# Patient Record
Sex: FEMALE | Race: BLACK OR AFRICAN AMERICAN | Employment: OTHER | URBAN - METROPOLITAN AREA
[De-identification: names, ages, dates, MRNs, and addresses within clinical notes are randomized per-mention and may not be internally consistent; named-entity substitution may affect disease eponyms.]

---

## 2017-01-31 DIAGNOSIS — E11.9 TYPE 2 DIABETES MELLITUS WITHOUT COMPLICATION, WITHOUT LONG-TERM CURRENT USE OF INSULIN (HCC): ICD-10-CM

## 2017-03-05 DIAGNOSIS — E11.9 TYPE 2 DIABETES MELLITUS WITHOUT COMPLICATION, WITHOUT LONG-TERM CURRENT USE OF INSULIN (HCC): ICD-10-CM

## 2017-03-21 DIAGNOSIS — Z12.31 ENCOUNTER FOR SCREENING MAMMOGRAM FOR MALIGNANT NEOPLASM OF BREAST: ICD-10-CM

## 2017-03-27 ENCOUNTER — OFFICE VISIT (OUTPATIENT)
Dept: FAMILY MEDICINE CLINIC | Age: 67
End: 2017-03-27
Payer: MEDICARE

## 2017-03-27 VITALS
HEIGHT: 63 IN | DIASTOLIC BLOOD PRESSURE: 79 MMHG | WEIGHT: 153 LBS | BODY MASS INDEX: 27.11 KG/M2 | HEART RATE: 70 BPM | SYSTOLIC BLOOD PRESSURE: 131 MMHG

## 2017-03-27 DIAGNOSIS — E78.2 MIXED HYPERLIPIDEMIA: ICD-10-CM

## 2017-03-27 DIAGNOSIS — M05.79 RHEUMATOID ARTHRITIS INVOLVING MULTIPLE SITES WITH POSITIVE RHEUMATOID FACTOR (HCC): ICD-10-CM

## 2017-03-27 DIAGNOSIS — D32.0 BENIGN NEOPLASM OF CEREBRAL MENINGES (HCC): ICD-10-CM

## 2017-03-27 DIAGNOSIS — E11.9 TYPE 2 DIABETES MELLITUS WITHOUT COMPLICATION, WITHOUT LONG-TERM CURRENT USE OF INSULIN (HCC): Primary | ICD-10-CM

## 2017-03-27 DIAGNOSIS — I10 ESSENTIAL HYPERTENSION: ICD-10-CM

## 2017-03-27 DIAGNOSIS — E66.09 NON MORBID OBESITY DUE TO EXCESS CALORIES: ICD-10-CM

## 2017-03-27 LAB — HBA1C MFR BLD: 7 %

## 2017-03-27 PROCEDURE — G8420 CALC BMI NORM PARAMETERS: HCPCS | Performed by: FAMILY MEDICINE

## 2017-03-27 PROCEDURE — G8427 DOCREV CUR MEDS BY ELIG CLIN: HCPCS | Performed by: FAMILY MEDICINE

## 2017-03-27 PROCEDURE — 99213 OFFICE O/P EST LOW 20 MIN: CPT | Performed by: FAMILY MEDICINE

## 2017-03-27 PROCEDURE — 83036 HEMOGLOBIN GLYCOSYLATED A1C: CPT | Performed by: FAMILY MEDICINE

## 2017-03-27 PROCEDURE — 3045F PR MOST RECENT HEMOGLOBIN A1C LEVEL 7.0-9.0%: CPT | Performed by: FAMILY MEDICINE

## 2017-03-27 PROCEDURE — 3014F SCREEN MAMMO DOC REV: CPT | Performed by: FAMILY MEDICINE

## 2017-03-27 PROCEDURE — 4040F PNEUMOC VAC/ADMIN/RCVD: CPT | Performed by: FAMILY MEDICINE

## 2017-03-27 PROCEDURE — 3017F COLORECTAL CA SCREEN DOC REV: CPT | Performed by: FAMILY MEDICINE

## 2017-03-27 PROCEDURE — G8400 PT W/DXA NO RESULTS DOC: HCPCS | Performed by: FAMILY MEDICINE

## 2017-03-27 PROCEDURE — G8484 FLU IMMUNIZE NO ADMIN: HCPCS | Performed by: FAMILY MEDICINE

## 2017-03-27 PROCEDURE — 1123F ACP DISCUSS/DSCN MKR DOCD: CPT | Performed by: FAMILY MEDICINE

## 2017-03-27 PROCEDURE — 1090F PRES/ABSN URINE INCON ASSESS: CPT | Performed by: FAMILY MEDICINE

## 2017-03-27 PROCEDURE — 1036F TOBACCO NON-USER: CPT | Performed by: FAMILY MEDICINE

## 2017-03-27 ASSESSMENT — ENCOUNTER SYMPTOMS
NAUSEA: 0
ABDOMINAL PAIN: 0
BLOOD IN STOOL: 0
SHORTNESS OF BREATH: 0
RHINORRHEA: 0
CONSTIPATION: 0
BACK PAIN: 0
COUGH: 0
DIARRHEA: 0

## 2017-03-27 ASSESSMENT — PATIENT HEALTH QUESTIONNAIRE - PHQ9
2. FEELING DOWN, DEPRESSED OR HOPELESS: 0
1. LITTLE INTEREST OR PLEASURE IN DOING THINGS: 0
SUM OF ALL RESPONSES TO PHQ QUESTIONS 1-9: 0
SUM OF ALL RESPONSES TO PHQ9 QUESTIONS 1 & 2: 0

## 2017-04-02 PROBLEM — M05.79 RHEUMATOID ARTHRITIS INVOLVING MULTIPLE SITES WITH POSITIVE RHEUMATOID FACTOR (HCC): Status: ACTIVE | Noted: 2017-04-02

## 2017-04-02 ASSESSMENT — ENCOUNTER SYMPTOMS
CHEST TIGHTNESS: 0
TROUBLE SWALLOWING: 0
SORE THROAT: 0

## 2017-04-20 DIAGNOSIS — E11.9 TYPE 2 DIABETES MELLITUS WITHOUT COMPLICATION, WITHOUT LONG-TERM CURRENT USE OF INSULIN (HCC): ICD-10-CM

## 2017-05-12 DIAGNOSIS — G44.219 EPISODIC TENSION-TYPE HEADACHE, NOT INTRACTABLE: ICD-10-CM

## 2017-05-12 DIAGNOSIS — M06.9 RHEUMATOID ARTHRITIS INVOLVING MULTIPLE SITES, UNSPECIFIED RHEUMATOID FACTOR PRESENCE: ICD-10-CM

## 2017-05-15 RX ORDER — HYDROCODONE BITARTRATE AND IBUPROFEN 7.5; 2 MG/1; MG/1
1 TABLET, FILM COATED ORAL EVERY 8 HOURS PRN
Qty: 30 TABLET | Refills: 0 | Status: SHIPPED | OUTPATIENT
Start: 2017-05-15 | End: 2018-06-25

## 2017-05-23 RX ORDER — AMLODIPINE BESYLATE 5 MG/1
TABLET ORAL
Qty: 90 TABLET | Refills: 0 | Status: SHIPPED | OUTPATIENT
Start: 2017-05-23 | End: 2017-08-21 | Stop reason: SDUPTHER

## 2017-06-12 RX ORDER — ATORVASTATIN CALCIUM 10 MG/1
TABLET, FILM COATED ORAL
Qty: 90 TABLET | Refills: 1 | Status: SHIPPED | OUTPATIENT
Start: 2017-06-12 | End: 2017-12-13 | Stop reason: SDUPTHER

## 2017-07-24 ENCOUNTER — OFFICE VISIT (OUTPATIENT)
Dept: FAMILY MEDICINE CLINIC | Age: 67
End: 2017-07-24
Payer: MEDICARE

## 2017-07-24 VITALS
BODY MASS INDEX: 27.18 KG/M2 | DIASTOLIC BLOOD PRESSURE: 65 MMHG | WEIGHT: 153.4 LBS | HEIGHT: 63 IN | SYSTOLIC BLOOD PRESSURE: 119 MMHG | HEART RATE: 69 BPM

## 2017-07-24 DIAGNOSIS — E11.9 TYPE 2 DIABETES MELLITUS WITHOUT COMPLICATION, WITHOUT LONG-TERM CURRENT USE OF INSULIN (HCC): ICD-10-CM

## 2017-07-24 DIAGNOSIS — D32.9 MENINGIOMA (HCC): ICD-10-CM

## 2017-07-24 DIAGNOSIS — E78.2 MIXED HYPERLIPIDEMIA: ICD-10-CM

## 2017-07-24 DIAGNOSIS — M05.79 RHEUMATOID ARTHRITIS INVOLVING MULTIPLE SITES WITH POSITIVE RHEUMATOID FACTOR (HCC): ICD-10-CM

## 2017-07-24 DIAGNOSIS — E66.09 NON MORBID OBESITY DUE TO EXCESS CALORIES: ICD-10-CM

## 2017-07-24 DIAGNOSIS — I10 ESSENTIAL HYPERTENSION: Primary | ICD-10-CM

## 2017-07-24 PROCEDURE — 1123F ACP DISCUSS/DSCN MKR DOCD: CPT | Performed by: FAMILY MEDICINE

## 2017-07-24 PROCEDURE — 1090F PRES/ABSN URINE INCON ASSESS: CPT | Performed by: FAMILY MEDICINE

## 2017-07-24 PROCEDURE — 3014F SCREEN MAMMO DOC REV: CPT | Performed by: FAMILY MEDICINE

## 2017-07-24 PROCEDURE — G8427 DOCREV CUR MEDS BY ELIG CLIN: HCPCS | Performed by: FAMILY MEDICINE

## 2017-07-24 PROCEDURE — 1036F TOBACCO NON-USER: CPT | Performed by: FAMILY MEDICINE

## 2017-07-24 PROCEDURE — 99214 OFFICE O/P EST MOD 30 MIN: CPT | Performed by: FAMILY MEDICINE

## 2017-07-24 PROCEDURE — 3046F HEMOGLOBIN A1C LEVEL >9.0%: CPT | Performed by: FAMILY MEDICINE

## 2017-07-24 PROCEDURE — 4040F PNEUMOC VAC/ADMIN/RCVD: CPT | Performed by: FAMILY MEDICINE

## 2017-07-24 PROCEDURE — 3017F COLORECTAL CA SCREEN DOC REV: CPT | Performed by: FAMILY MEDICINE

## 2017-07-24 PROCEDURE — G8400 PT W/DXA NO RESULTS DOC: HCPCS | Performed by: FAMILY MEDICINE

## 2017-07-24 PROCEDURE — G8419 CALC BMI OUT NRM PARAM NOF/U: HCPCS | Performed by: FAMILY MEDICINE

## 2017-07-24 RX ORDER — SPIRONOLACTONE AND HYDROCHLOROTHIAZIDE 25; 25 MG/1; MG/1
TABLET ORAL
COMMUNITY
Start: 2017-06-12 | End: 2017-07-24 | Stop reason: SDUPTHER

## 2017-07-24 ASSESSMENT — ENCOUNTER SYMPTOMS
BACK PAIN: 0
DIARRHEA: 0
RHINORRHEA: 0
ABDOMINAL PAIN: 0
CHEST TIGHTNESS: 0
CONSTIPATION: 0
SORE THROAT: 0
TROUBLE SWALLOWING: 0
SHORTNESS OF BREATH: 0
NAUSEA: 0
COUGH: 0

## 2017-08-15 DIAGNOSIS — M05.79 RHEUMATOID ARTHRITIS INVOLVING MULTIPLE SITES WITH POSITIVE RHEUMATOID FACTOR (HCC): Primary | ICD-10-CM

## 2017-08-15 RX ORDER — HYDROCODONE BITARTRATE AND ACETAMINOPHEN 5; 325 MG/1; MG/1
1 TABLET ORAL NIGHTLY PRN
Qty: 30 TABLET | Refills: 0 | Status: SHIPPED | OUTPATIENT
Start: 2017-08-15 | End: 2018-11-16 | Stop reason: SDUPTHER

## 2017-08-21 RX ORDER — AMLODIPINE BESYLATE 5 MG/1
TABLET ORAL
Qty: 90 TABLET | Refills: 3 | Status: SHIPPED | OUTPATIENT
Start: 2017-08-21 | End: 2018-08-16 | Stop reason: SDUPTHER

## 2017-08-25 DIAGNOSIS — E11.9 TYPE 2 DIABETES MELLITUS WITHOUT COMPLICATION, WITHOUT LONG-TERM CURRENT USE OF INSULIN (HCC): ICD-10-CM

## 2017-09-28 DIAGNOSIS — E11.9 TYPE 2 DIABETES MELLITUS WITHOUT COMPLICATION, WITHOUT LONG-TERM CURRENT USE OF INSULIN (HCC): ICD-10-CM

## 2017-10-24 ENCOUNTER — OFFICE VISIT (OUTPATIENT)
Dept: FAMILY MEDICINE CLINIC | Age: 67
End: 2017-10-24
Payer: MEDICARE

## 2017-10-24 VITALS
BODY MASS INDEX: 26.68 KG/M2 | WEIGHT: 150.6 LBS | HEART RATE: 70 BPM | DIASTOLIC BLOOD PRESSURE: 73 MMHG | HEIGHT: 63 IN | SYSTOLIC BLOOD PRESSURE: 107 MMHG

## 2017-10-24 DIAGNOSIS — M05.79 RHEUMATOID ARTHRITIS INVOLVING MULTIPLE SITES WITH POSITIVE RHEUMATOID FACTOR (HCC): ICD-10-CM

## 2017-10-24 DIAGNOSIS — E11.9 TYPE 2 DIABETES MELLITUS WITHOUT COMPLICATION, WITHOUT LONG-TERM CURRENT USE OF INSULIN (HCC): ICD-10-CM

## 2017-10-24 DIAGNOSIS — E66.09 NON MORBID OBESITY DUE TO EXCESS CALORIES: ICD-10-CM

## 2017-10-24 DIAGNOSIS — D32.9 MENINGIOMA (HCC): ICD-10-CM

## 2017-10-24 DIAGNOSIS — E78.2 MIXED HYPERLIPIDEMIA: ICD-10-CM

## 2017-10-24 DIAGNOSIS — I10 ESSENTIAL HYPERTENSION: Primary | ICD-10-CM

## 2017-10-24 PROCEDURE — 99213 OFFICE O/P EST LOW 20 MIN: CPT | Performed by: FAMILY MEDICINE

## 2017-10-24 PROCEDURE — G8427 DOCREV CUR MEDS BY ELIG CLIN: HCPCS | Performed by: FAMILY MEDICINE

## 2017-10-24 PROCEDURE — G8400 PT W/DXA NO RESULTS DOC: HCPCS | Performed by: FAMILY MEDICINE

## 2017-10-24 PROCEDURE — 1036F TOBACCO NON-USER: CPT | Performed by: FAMILY MEDICINE

## 2017-10-24 PROCEDURE — 3017F COLORECTAL CA SCREEN DOC REV: CPT | Performed by: FAMILY MEDICINE

## 2017-10-24 PROCEDURE — 1090F PRES/ABSN URINE INCON ASSESS: CPT | Performed by: FAMILY MEDICINE

## 2017-10-24 PROCEDURE — G8417 CALC BMI ABV UP PARAM F/U: HCPCS | Performed by: FAMILY MEDICINE

## 2017-10-24 PROCEDURE — 3045F PR MOST RECENT HEMOGLOBIN A1C LEVEL 7.0-9.0%: CPT | Performed by: FAMILY MEDICINE

## 2017-10-24 PROCEDURE — 4040F PNEUMOC VAC/ADMIN/RCVD: CPT | Performed by: FAMILY MEDICINE

## 2017-10-24 PROCEDURE — 3014F SCREEN MAMMO DOC REV: CPT | Performed by: FAMILY MEDICINE

## 2017-10-24 PROCEDURE — G8484 FLU IMMUNIZE NO ADMIN: HCPCS | Performed by: FAMILY MEDICINE

## 2017-10-24 PROCEDURE — 1123F ACP DISCUSS/DSCN MKR DOCD: CPT | Performed by: FAMILY MEDICINE

## 2017-10-24 RX ORDER — SPIRONOLACTONE AND HYDROCHLOROTHIAZIDE 25; 25 MG/1; MG/1
TABLET ORAL
COMMUNITY
Start: 2017-09-14 | End: 2021-07-14 | Stop reason: ALTCHOICE

## 2017-10-24 NOTE — PATIENT INSTRUCTIONS

## 2017-10-24 NOTE — PROGRESS NOTES
Chronic Disease Visit Information    BP Readings from Last 3 Encounters:   10/24/17 107/73   07/24/17 119/65   03/27/17 131/79          Hemoglobin A1C (%)   Date Value   03/27/2017 7.0   12/28/2016 7.0   09/29/2016 7.0     BUN (mg/dL)   Date Value   06/20/2016 15     CREATININE (no units)   Date Value   06/20/2016 0.68     Glucose (mg/dL)   Date Value   06/20/2016 123            Have you changed or started any medications since your last visit including any over-the-counter medicines, vitamins, or herbal medicines? no   Are you having any side effects from any of your medications? -  no  Have you stopped taking any of your medications? Is so, why? -  no    Have you seen any other physician or provider since your last visit? Yes - Records Obtained  Have you had any other diagnostic tests since your last visit? Yes - Records Obtained  Have you been seen in the emergency room and/or had an admission to a hospital since we last saw you? No  Have you had your annual diabetic retinal (eye) exam? No  Have you had your routine dental cleaning in the past 6 months? no    Have you activated your [employer] account? If not, what are your barriers?  Yes     Patient Care Team:  Sushma Soares MD as PCP - General (Family Medicine)  Sushma Soares MD as PCP - [facility] Attributed Provider         Medical History Review  Past Medical, Family, and Social History reviewed and does contribute to the patient presenting condition    Health Maintenance   Topic Date Due    Diabetic foot exam  03/02/1961    Diabetic retinal exam  03/02/1961    Diabetic microalbuminuria test  03/02/1969    Breast cancer screen  02/13/2017    Lipid screen  04/14/2017    Flu vaccine (1) 12/20/2017 (Originally 9/1/2017)    Pneumococcal low/med risk (1 of 2 - PCV13) 12/20/2017 (Originally 3/2/2016)    DTaP/Tdap/Td vaccine (1 - Tdap) 03/27/2018 (Originally 3/2/1970)    Diabetic hemoglobin A1C test  03/27/2018    Colon cancer screen colonoscopy 06/01/2020    Zostavax vaccine  Addressed    DEXA (modify frequency per FRAX score)  Addressed    Hepatitis C screen  Addressed

## 2017-10-25 ASSESSMENT — ENCOUNTER SYMPTOMS
BACK PAIN: 1
DIARRHEA: 0
ABDOMINAL PAIN: 0
NAUSEA: 0
TROUBLE SWALLOWING: 0
RHINORRHEA: 0
SHORTNESS OF BREATH: 0
CHEST TIGHTNESS: 0
COUGH: 0
SORE THROAT: 0
CONSTIPATION: 0

## 2017-11-01 DIAGNOSIS — E11.9 TYPE 2 DIABETES MELLITUS WITHOUT COMPLICATION, WITHOUT LONG-TERM CURRENT USE OF INSULIN (HCC): ICD-10-CM

## 2017-11-01 NOTE — TELEPHONE ENCOUNTER
Requested Prescriptions     Pending Prescriptions Disp Refills    metFORMIN (GLUCOPHAGE) 500 MG tablet [Pharmacy Med Name: metFORMIN  MG TABLET] 60 tablet 0     Sig: TAKE ONE TABLET BY MOUTH TWICE A DAY WITH MEALS     Next Visit Date:  Future Appointments  Date Time Provider Augustina Lopez   2/21/2018 9:45 AM Elias Russ MD [address] Maintenance   Topic Date Due    Diabetic foot exam  03/02/1961    Diabetic retinal exam  03/02/1961    Diabetic microalbuminuria test  03/02/1969    Breast cancer screen  02/13/2017    Lipid screen  04/14/2017    Flu vaccine (1) 12/20/2017 (Originally 9/1/2017)    Pneumococcal low/med risk (1 of 2 - PCV13) 12/20/2017 (Originally 3/2/2016)    DTaP/Tdap/Td vaccine (1 - Tdap) 03/27/2018 (Originally 3/2/1970)    Diabetic hemoglobin A1C test  03/27/2018    Colon cancer screen colonoscopy  06/01/2020    Zostavax vaccine  Addressed    DEXA (modify frequency per FRAX score)  Addressed    Hepatitis C screen  Addressed       Hemoglobin A1C (%)   Date Value   03/27/2017 7.0   12/28/2016 7.0   09/29/2016 7.0             ( goal A1C is < 7)   No results found for: LABMICR  No results found for: LDLCHOLESTEROL, LDLCALC    (goal LDL is <100)   AST (U/L)   Date Value   06/20/2016 24     ALT (U/L)   Date Value   06/20/2016 41     BUN (mg/dL)   Date Value   06/20/2016 15     BP Readings from Last 3 Encounters:   10/24/17 107/73   07/24/17 119/65   03/27/17 131/79          (goal 120/80)    All Future Testing planned in [facility]  Lab Frequency Next Occurrence               Patient Active Problem List:     Neck sprain     Hyperlipidemia     Obesity     DJD (degenerative joint disease) of cervical spine     Meningioma (HCC)     FH: breast cancer in first degree relative     Fibrocystic breast disease in female     Cramping of hands     Essential hypertension     Non morbid obesity due to excess calories     Type 2 diabetes mellitus without complication,

## 2017-11-21 ENCOUNTER — OFFICE VISIT (OUTPATIENT)
Dept: FAMILY MEDICINE CLINIC | Age: 67
End: 2017-11-21
Payer: MEDICARE

## 2017-11-21 VITALS
HEIGHT: 63 IN | BODY MASS INDEX: 26.72 KG/M2 | TEMPERATURE: 99.4 F | HEART RATE: 85 BPM | WEIGHT: 150.8 LBS | SYSTOLIC BLOOD PRESSURE: 125 MMHG | DIASTOLIC BLOOD PRESSURE: 83 MMHG

## 2017-11-21 DIAGNOSIS — I10 ESSENTIAL HYPERTENSION: ICD-10-CM

## 2017-11-21 DIAGNOSIS — J01.90 ACUTE RHINOSINUSITIS: Primary | ICD-10-CM

## 2017-11-21 DIAGNOSIS — J04.2 ACUTE LARYNGOTRACHEITIS: ICD-10-CM

## 2017-11-21 DIAGNOSIS — M05.79 RHEUMATOID ARTHRITIS INVOLVING MULTIPLE SITES WITH POSITIVE RHEUMATOID FACTOR (HCC): ICD-10-CM

## 2017-11-21 DIAGNOSIS — D32.9 MENINGIOMA (HCC): ICD-10-CM

## 2017-11-21 DIAGNOSIS — E11.9 TYPE 2 DIABETES MELLITUS WITHOUT COMPLICATION, WITHOUT LONG-TERM CURRENT USE OF INSULIN (HCC): ICD-10-CM

## 2017-11-21 PROCEDURE — G8427 DOCREV CUR MEDS BY ELIG CLIN: HCPCS | Performed by: FAMILY MEDICINE

## 2017-11-21 PROCEDURE — 1090F PRES/ABSN URINE INCON ASSESS: CPT | Performed by: FAMILY MEDICINE

## 2017-11-21 PROCEDURE — 1123F ACP DISCUSS/DSCN MKR DOCD: CPT | Performed by: FAMILY MEDICINE

## 2017-11-21 PROCEDURE — G8417 CALC BMI ABV UP PARAM F/U: HCPCS | Performed by: FAMILY MEDICINE

## 2017-11-21 PROCEDURE — G8484 FLU IMMUNIZE NO ADMIN: HCPCS | Performed by: FAMILY MEDICINE

## 2017-11-21 PROCEDURE — 4040F PNEUMOC VAC/ADMIN/RCVD: CPT | Performed by: FAMILY MEDICINE

## 2017-11-21 PROCEDURE — 3017F COLORECTAL CA SCREEN DOC REV: CPT | Performed by: FAMILY MEDICINE

## 2017-11-21 PROCEDURE — 3045F PR MOST RECENT HEMOGLOBIN A1C LEVEL 7.0-9.0%: CPT | Performed by: FAMILY MEDICINE

## 2017-11-21 PROCEDURE — G8400 PT W/DXA NO RESULTS DOC: HCPCS | Performed by: FAMILY MEDICINE

## 2017-11-21 PROCEDURE — 3014F SCREEN MAMMO DOC REV: CPT | Performed by: FAMILY MEDICINE

## 2017-11-21 PROCEDURE — 99213 OFFICE O/P EST LOW 20 MIN: CPT | Performed by: FAMILY MEDICINE

## 2017-11-21 PROCEDURE — 1036F TOBACCO NON-USER: CPT | Performed by: FAMILY MEDICINE

## 2017-11-21 ASSESSMENT — ENCOUNTER SYMPTOMS
SORE THROAT: 0
VOICE CHANGE: 1
FACIAL SWELLING: 0
COUGH: 1
ABDOMINAL PAIN: 0
WHEEZING: 0
TROUBLE SWALLOWING: 0
CONSTIPATION: 0
DIARRHEA: 0
NAUSEA: 0

## 2017-11-21 NOTE — PROGRESS NOTES
swelling, sneezing, sore throat and trouble swallowing. Respiratory: Positive for cough. Negative for wheezing. Gastrointestinal: Negative for abdominal pain, constipation, diarrhea and nausea. Genitourinary: Negative for difficulty urinating and dysuria. Musculoskeletal: Positive for arthralgias. Neurological: Negative for headaches. Psychiatric/Behavioral: Positive for sleep disturbance. Objective:     Physical Exam   Constitutional: She is oriented to person, place, and time. Vital signs are normal. She appears well-developed and well-nourished. HENT:   Head: Normocephalic. Right Ear: External ear normal.   Left Ear: External ear normal.   Nose is congested and wet. Narrow nasal passages. Discharge plus. Erythema plus. Sinuses are tender. Throat shows erythema plus. Tongue is dry. Buccal mucosa is dry. No exudates noted. Eyes: Pupils are equal, round, and reactive to light. No scleral icterus. Neck: Normal range of motion. Neck supple. Neck shows no nodes or bruits. Thyroid is not palpable. Cardiovascular: Normal rate, regular rhythm, normal heart sounds and normal pulses. Exam reveals no gallop. Pulmonary/Chest: Effort normal.   Breath sounds are diminished. Scattered crackles and wheezes noted. Cough plus. Abdominal: Soft. Bowel sounds are normal. She exhibits no mass. There is no tenderness. Liver and spleen are not palpable. Musculoskeletal:   She moves all her limbs well. She is able to lie down and sit up. No tremors noted. Gait is stable. Lymphadenopathy:     She has no cervical adenopathy. Neurological: She is alert and oriented to person, place, and time. Coordination normal.   Skin: Skin is warm and dry. No rash noted. Psychiatric: She has a normal mood and affect. Her behavior is normal.   Nursing note and vitals reviewed.     /83 (Site: Left Arm, Position: Sitting, Cuff Size: Large Adult)   Pulse 85   Temp 99.4 °F (37.4 °C) (Oral) Ht 5' 3\" (1.6 m)   Wt 150 lb 12.8 oz (68.4 kg)   LMP  (LMP Unknown)   Breastfeeding? No   BMI 26.71 kg/m²     Assessment:      1. Acute rhinosinusitis     2. Acute laryngotracheitis     3. Meningioma ([facility] [facility]     4. Essential hypertension     5. Type 2 diabetes mellitus without complication, without long-term current use of insulin ([facility] [facility]     6. Rheumatoid arthritis involving multiple sites with positive rheumatoid factor ([facility]         Plan:      No Follow-up on file. No orders of the defined types were placed in this encounter. No orders of the defined types were placed in this encounter. Findings and/or pathophysiology discussed with patient. Plan of treatment discussed. Patient's medical problems were discussed with her. Findings were explained. Pathophysiology was discussed. Patient appears to be improving. Patient states she has been feeling better over the past couple of days. She will take Tylenol. She is on low-dose steroids. She'll drink lots of fluids. She'll gargle her throat. She'll call if she does not feel better or she will go to the ER. 1.  Freida received counseling on the following healthy behaviors: nutrition and exercise  2. Patient given educational materials - see patient instructions  3. Was a self-tracking handout given in paper form or via [employer]? No  If yes, see orders or list here. 4.  Discussed use, benefit, and side effects of prescribed medications. Barriers to medication compliance addressed. All patient questions answered. Pt voiced understanding. 5.  Reviewed prior labs and health maintenance  6. Continue current medications, diet and exercise.     Completed Refills   Requested Prescriptions      No prescriptions requested or ordered in this encounter     Electronically signed by Darlene Quinn MD on 12/10/2017 at 7:04 PM

## 2017-12-03 DIAGNOSIS — E11.9 TYPE 2 DIABETES MELLITUS WITHOUT COMPLICATION, WITHOUT LONG-TERM CURRENT USE OF INSULIN (HCC): ICD-10-CM

## 2017-12-04 NOTE — TELEPHONE ENCOUNTER
arthritis involving multiple sites with positive rheumatoid factor ([facility]

## 2017-12-13 RX ORDER — ATORVASTATIN CALCIUM 10 MG/1
TABLET, FILM COATED ORAL
Qty: 90 TABLET | Refills: 1 | Status: SHIPPED | OUTPATIENT
Start: 2017-12-13 | End: 2018-06-11 | Stop reason: SDUPTHER

## 2017-12-13 NOTE — TELEPHONE ENCOUNTER
Requested Prescriptions     Pending Prescriptions Disp Refills    atorvastatin (LIPITOR) 10 MG tablet [Pharmacy Med Name: ATORVASTATIN TABS 10MG] 90 tablet 1     Sig: TAKE 1 TABLET DAILY     Next Visit Date:  Future Appointments  Date Time Provider Augustina Jessica   2/21/2018 9:45 AM Chanda Sargent MD [address] Maintenance   Topic Date Due    Diabetic foot exam  03/02/1961    Diabetic retinal exam  03/02/1961    Diabetic microalbuminuria test  03/02/1969    Breast cancer screen  02/13/2017    Lipid screen  04/14/2017    Flu vaccine (1) 12/20/2017 (Originally 9/1/2017)    Pneumococcal low/med risk (1 of 2 - PCV13) 12/20/2017 (Originally 3/2/2016)    DTaP/Tdap/Td vaccine (1 - Tdap) 03/27/2018 (Originally 3/2/1970)    Diabetic hemoglobin A1C test  03/27/2018    Colon cancer screen colonoscopy  06/01/2020    Zostavax vaccine  Addressed    DEXA (modify frequency per FRAX score)  Addressed    Hepatitis C screen  Addressed       Hemoglobin A1C (%)   Date Value   03/27/2017 7.0   12/28/2016 7.0   09/29/2016 7.0             ( goal A1C is < 7)   No results found for: LABMICR  No results found for: LDLCHOLESTEROL, LDLCALC    (goal LDL is <100)   AST (U/L)   Date Value   06/20/2016 24     ALT (U/L)   Date Value   06/20/2016 41     BUN (mg/dL)   Date Value   06/20/2016 15     BP Readings from Last 3 Encounters:   11/21/17 125/83   10/24/17 107/73   07/24/17 119/65          (goal 120/80)    All Future Testing planned in [facility]  Lab Frequency Next Occurrence               Patient Active Problem List:     Neck sprain     Hyperlipidemia     Obesity     DJD (degenerative joint disease) of cervical spine     Meningioma (HCC)     FH: breast cancer in first degree relative     Fibrocystic breast disease in female     Cramping of hands     Essential hypertension     Non morbid obesity due to excess calories     Type 2 diabetes mellitus without complication, without long-term current use of

## 2017-12-21 LAB
BASOPHILS ABSOLUTE: NORMAL /ΜL
BASOPHILS RELATIVE PERCENT: NORMAL %
BUN BLDV-MCNC: ABNORMAL MG/DL
CALCIUM SERPL-MCNC: ABNORMAL MG/DL
CHLORIDE BLD-SCNC: ABNORMAL MMOL/L
CO2: ABNORMAL MMOL/L
CREAT SERPL-MCNC: 1.2 MG/DL
EOSINOPHILS ABSOLUTE: NORMAL /ΜL
EOSINOPHILS RELATIVE PERCENT: NORMAL %
GFR CALCULATED: 54.4
GLUCOSE BLD-MCNC: ABNORMAL MG/DL
HCT VFR BLD CALC: NORMAL % (ref 36–46)
HEMOGLOBIN: NORMAL G/DL (ref 12–16)
LYMPHOCYTES ABSOLUTE: NORMAL /ΜL
LYMPHOCYTES RELATIVE PERCENT: NORMAL %
MCH RBC QN AUTO: 29.9 PG
MCHC RBC AUTO-ENTMCNC: 35 G/DL
MCV RBC AUTO: 85.5 FL
MONOCYTES ABSOLUTE: NORMAL /ΜL
MONOCYTES RELATIVE PERCENT: NORMAL %
NEUTROPHILS ABSOLUTE: NORMAL /ΜL
NEUTROPHILS RELATIVE PERCENT: NORMAL %
PLATELET # BLD: 304 K/ΜL
PMV BLD AUTO: NORMAL FL
POTASSIUM SERPL-SCNC: ABNORMAL MMOL/L
RBC # BLD: 4.48 10^6/ΜL
SODIUM BLD-SCNC: ABNORMAL MMOL/L
WBC # BLD: 7.01 10^3/ML

## 2017-12-26 ENCOUNTER — OFFICE VISIT (OUTPATIENT)
Dept: FAMILY MEDICINE CLINIC | Age: 67
End: 2017-12-26
Payer: MEDICARE

## 2017-12-26 VITALS
HEIGHT: 63 IN | HEART RATE: 71 BPM | WEIGHT: 152 LBS | SYSTOLIC BLOOD PRESSURE: 126 MMHG | BODY MASS INDEX: 26.93 KG/M2 | DIASTOLIC BLOOD PRESSURE: 72 MMHG

## 2017-12-26 DIAGNOSIS — N28.9 RENAL INSUFFICIENCY: Primary | ICD-10-CM

## 2017-12-26 DIAGNOSIS — I10 ESSENTIAL HYPERTENSION: ICD-10-CM

## 2017-12-26 DIAGNOSIS — E11.9 TYPE 2 DIABETES MELLITUS WITHOUT COMPLICATION, WITHOUT LONG-TERM CURRENT USE OF INSULIN (HCC): ICD-10-CM

## 2017-12-26 DIAGNOSIS — M05.79 RHEUMATOID ARTHRITIS INVOLVING MULTIPLE SITES WITH POSITIVE RHEUMATOID FACTOR (HCC): ICD-10-CM

## 2017-12-26 DIAGNOSIS — D32.0 BENIGN NEOPLASM OF CEREBRAL MENINGES (HCC): ICD-10-CM

## 2017-12-26 PROCEDURE — 1090F PRES/ABSN URINE INCON ASSESS: CPT | Performed by: FAMILY MEDICINE

## 2017-12-26 PROCEDURE — G8417 CALC BMI ABV UP PARAM F/U: HCPCS | Performed by: FAMILY MEDICINE

## 2017-12-26 PROCEDURE — 3014F SCREEN MAMMO DOC REV: CPT | Performed by: FAMILY MEDICINE

## 2017-12-26 PROCEDURE — G8484 FLU IMMUNIZE NO ADMIN: HCPCS | Performed by: FAMILY MEDICINE

## 2017-12-26 PROCEDURE — 99213 OFFICE O/P EST LOW 20 MIN: CPT | Performed by: FAMILY MEDICINE

## 2017-12-26 PROCEDURE — 3017F COLORECTAL CA SCREEN DOC REV: CPT | Performed by: FAMILY MEDICINE

## 2017-12-26 PROCEDURE — 3045F PR MOST RECENT HEMOGLOBIN A1C LEVEL 7.0-9.0%: CPT | Performed by: FAMILY MEDICINE

## 2017-12-26 PROCEDURE — 1036F TOBACCO NON-USER: CPT | Performed by: FAMILY MEDICINE

## 2017-12-26 PROCEDURE — G8427 DOCREV CUR MEDS BY ELIG CLIN: HCPCS | Performed by: FAMILY MEDICINE

## 2017-12-26 PROCEDURE — G8400 PT W/DXA NO RESULTS DOC: HCPCS | Performed by: FAMILY MEDICINE

## 2017-12-26 PROCEDURE — 1123F ACP DISCUSS/DSCN MKR DOCD: CPT | Performed by: FAMILY MEDICINE

## 2017-12-26 PROCEDURE — 4040F PNEUMOC VAC/ADMIN/RCVD: CPT | Performed by: FAMILY MEDICINE

## 2017-12-26 NOTE — PROGRESS NOTES
Chronic Disease Visit Information    BP Readings from Last 3 Encounters:   11/21/17 125/83   10/24/17 107/73   07/24/17 119/65          Hemoglobin A1C (%)   Date Value   03/27/2017 7.0   12/28/2016 7.0   09/29/2016 7.0     BUN (mg/dL)   Date Value   06/20/2016 15     CREATININE (no units)   Date Value   12/21/2017 1.20 (H)     Glucose (mg/dL)   Date Value   06/20/2016 123            Have you changed or started any medications since your last visit including any over-the-counter medicines, vitamins, or herbal medicines? no   Are you having any side effects from any of your medications? -  no  Have you stopped taking any of your medications? Is so, why? -  no    Have you seen any other physician or provider since your last visit? No  Have you had any other diagnostic tests since your last visit? No  Have you been seen in the emergency room and/or had an admission to a hospital since we last saw you? No  Have you had your annual diabetic retinal (eye) exam? No  Have you had your routine dental cleaning in the past 6 months? no    Have you activated your [email or web address] account? If not, what are your barriers?  Yes     Patient Care Team:  Terrell Laguna MD as PCP - General (Family Medicine)  Terrell Laguna MD as PCP - S Attributed Provider         Medical History Review  Past Medical, Family, and Social History reviewed and does contribute to the patient presenting condition    Health Maintenance   Topic Date Due    Diabetic foot exam  03/02/1961    Diabetic retinal exam  03/02/1961    Diabetic microalbuminuria test  03/02/1969    Pneumococcal low/med risk (1 of 2 - PCV13) 03/02/2016    Breast cancer screen  02/13/2017    Lipid screen  04/14/2017    Flu vaccine (1) 09/01/2017    DTaP/Tdap/Td vaccine (1 - Tdap) 03/27/2018 (Originally 3/2/1970)    Diabetic hemoglobin A1C test  03/27/2018    Colon cancer screen colonoscopy  06/01/2020    Zostavax vaccine  Addressed    DEXA (modify frequency per FRAX score) Addressed    Hepatitis C screen  Addressed

## 2017-12-26 NOTE — PATIENT INSTRUCTIONS
Patient Education        Well Visit, Over 72: Care Instructions  Your Care Instructions    Physical exams can help you stay healthy. Your doctor has checked your overall health and may have suggested ways to take good care of yourself. He or she also may have recommended tests. At home, you can help prevent illness with healthy eating, regular exercise, and other steps. Follow-up care is a key part of your treatment and safety. Be sure to make and go to all appointments, and call your doctor if you are having problems. It's also a good idea to know your test results and keep a list of the medicines you take. How can you care for yourself at home? · Reach and stay at a healthy weight. This will lower your risk for many problems, such as obesity, diabetes, heart disease, and high blood pressure. · Get at least 30 minutes of exercise on most days of the week. Walking is a good choice. You also may want to do other activities, such as running, swimming, cycling, or playing tennis or team sports. · Do not smoke. Smoking can make health problems worse. If you need help quitting, talk to your doctor about stop-smoking programs and medicines. These can increase your chances of quitting for good. · Protect your skin from too much sun. When you're outdoors from 10 a.m. to 4 p.m., stay in the shade or cover up with clothing and a hat with a wide brim. Wear sunglasses that block UV rays. Even when it's cloudy, put broad-spectrum sunscreen (SPF 30 or higher) on any exposed skin. · See a dentist one or two times a year for checkups and to have your teeth cleaned. · Wear a seat belt in the car. · Limit alcohol to 2 drinks a day for men and 1 drink a day for women. Too much alcohol can cause health problems. Follow your doctor's advice about when to have certain tests. These tests can spot problems early. For men and women  · Cholesterol.  Your doctor will tell you how often to have this done based on your overall health test. Some experts recommend that you discuss the benefits and risks of the test with your doctor. · Abdominal aortic aneurysm. Ask your doctor whether you should have a test to check for an aneurysm. You may need a test if you ever smoked or if your parent, brother, sister, or child has had an aneurysm. When should you call for help? Watch closely for changes in your health, and be sure to contact your doctor if you have any problems or symptoms that concern you. Where can you learn more? Go to https://[employer]. org and sign in to your [employer] account. Enter S631 in the [employer] box to learn more about \"Well Visit, Over 65: Care Instructions. \"     If you do not have an account, please click on the \"Sign Up Now\" link. Current as of: May 12, 2017  Content Version: 11.4  © 4345-0258 Healthwise, Incorporated. Care instructions adapted under license by [facility] ([facility]). If you have questions about a medical condition or this instruction, always ask your healthcare professional. [address] any warranty or liability for your use of this information.

## 2017-12-28 ASSESSMENT — ENCOUNTER SYMPTOMS
SHORTNESS OF BREATH: 0
RHINORRHEA: 0
DIARRHEA: 0
ABDOMINAL PAIN: 0
COUGH: 0
BACK PAIN: 0
NAUSEA: 0
CHEST TIGHTNESS: 0
TROUBLE SWALLOWING: 0
SORE THROAT: 0
CONSTIPATION: 0

## 2017-12-28 NOTE — PROGRESS NOTES
Negative for chills, diaphoresis, fatigue and fever. HENT: Negative for congestion, ear pain, rhinorrhea, sore throat and trouble swallowing. Eyes: Negative for visual disturbance. Respiratory: Negative for cough, chest tightness and shortness of breath. Cardiovascular: Negative for chest pain, palpitations and leg swelling. Gastrointestinal: Negative for abdominal pain, constipation, diarrhea and nausea. Endocrine: Negative for polydipsia and polyuria. Genitourinary: Negative for difficulty urinating, dysuria and hematuria. Musculoskeletal: Positive for arthralgias and gait problem. Negative for back pain and neck pain. Skin: Negative for rash. Neurological: Negative for dizziness, light-headedness and headaches. Psychiatric/Behavioral: Negative for dysphoric mood and sleep disturbance. The patient is not nervous/anxious. Objective:     Physical Exam   Constitutional: She is oriented to person, place, and time. Vital signs are normal. She appears well-developed and well-nourished. HENT:   Head: Normocephalic. Mouth/Throat: Oropharynx is clear and moist.   Eyes: Pupils are equal, round, and reactive to light. No scleral icterus. Neck: Normal range of motion. Neck supple. No JVD present. Neck shows no nodes or bruits. Thyroid is not palpable. Cardiovascular: Normal rate, regular rhythm, normal heart sounds and normal pulses. Exam reveals no gallop. Pulmonary/Chest: Effort normal and breath sounds normal. She has no wheezes. She has no rales. Abdominal: Soft. Bowel sounds are normal. She exhibits no mass. There is no tenderness. Liver and spleen are not palpable. Musculoskeletal: Normal range of motion. Legs show no edema. Both knees move well with mild tenderness. She is able to lie down and sit up. Gait is stable. Lymphadenopathy:     She has no cervical adenopathy. Neurological: She is alert and oriented to person, place, and time.    Skin: Skin is warm and dry. Psychiatric: She has a normal mood and affect. Her behavior is normal. Thought content normal.   Nursing note and vitals reviewed. /72 (Site: Left Arm, Position: Sitting, Cuff Size: Large Adult)   Pulse 71   Ht 5' 2.99\" (1.6 m)   Wt 152 lb (68.9 kg)   LMP  (LMP Unknown)   Breastfeeding? No   BMI 26.93 kg/m²     Assessment:      1. Renal insufficiency  Comprehensive Metabolic Panel   2. Essential hypertension     3. Type 2 diabetes mellitus without complication, without long-term current use of insulin ([facility] [facility]     4. Benign neoplasm of cerebral meninges (HCC)     5. Rheumatoid arthritis involving multiple sites with positive rheumatoid factor ([facility] [address] [facility]         Plan:      Return in about 1 month (around 1/26/2018). Orders Placed This Encounter   Procedures    Comprehensive Metabolic Panel     Standing Status:   Future     Standing Expiration Date:   12/26/2018     No orders of the defined types were placed in this encounter. Findings and/or pathophysiology discussed with patient. Plan of treatment discussed. Patient's medical problems were discussed with her. Findings were explained. Chart was evaluated. Past lab work was discussed. Tests results were discussed. Her medications were reviewed. Patient advised to drink lots of fluids. She'll get repeat CMP in about 2 weeks' time. She may need further evaluation as needed. She'll continue with her medications. Range of motion exercises were discussed. 1.  Freida received counseling on the following healthy behaviors: nutrition and exercise  2. Patient given educational materials - see patient instructions  3. Was a self-tracking handout given in paper form or via [email or web address]? No  If yes, see orders or list here. 4.  Discussed use, benefit, and side effects of prescribed medications. Barriers to medication compliance addressed. All patient questions answered. Pt voiced understanding.    5.  Reviewed prior labs and health

## 2018-01-09 DIAGNOSIS — E78.2 MIXED HYPERLIPIDEMIA: ICD-10-CM

## 2018-01-09 DIAGNOSIS — N28.9 RENAL INSUFFICIENCY: ICD-10-CM

## 2018-01-09 LAB
ALBUMIN SERPL-MCNC: 4 G/DL
ALP BLD-CCNC: 59 U/L
ALT SERPL-CCNC: 26 U/L
ANION GAP SERPL CALCULATED.3IONS-SCNC: ABNORMAL MMOL/L
AST SERPL-CCNC: 19 U/L
BILIRUB SERPL-MCNC: 0.6 MG/DL (ref 0.1–1.4)
BUN BLDV-MCNC: 18 MG/DL
CALCIUM SERPL-MCNC: 9.8 MG/DL
CHLORIDE BLD-SCNC: 100 MMOL/L
CHOLESTEROL, TOTAL: 198 MG/DL
CHOLESTEROL/HDL RATIO: 3
CO2: 28 MMOL/L
CREAT SERPL-MCNC: 0.9 MG/DL
GFR CALCULATED: 75.8
GLUCOSE BLD-MCNC: 112 MG/DL
HDLC SERPL-MCNC: 66 MG/DL (ref 35–70)
LDL CHOLESTEROL CALCULATED: 100 MG/DL (ref 0–160)
POTASSIUM SERPL-SCNC: 4.2 MMOL/L
SODIUM BLD-SCNC: 142 MMOL/L
TOTAL PROTEIN: 7.1
TRIGL SERPL-MCNC: 160 MG/DL
VLDLC SERPL CALC-MCNC: 32 MG/DL

## 2018-01-16 ENCOUNTER — OFFICE VISIT (OUTPATIENT)
Dept: FAMILY MEDICINE CLINIC | Age: 68
End: 2018-01-16
Payer: MEDICARE

## 2018-01-16 VITALS
BODY MASS INDEX: 26.72 KG/M2 | SYSTOLIC BLOOD PRESSURE: 102 MMHG | DIASTOLIC BLOOD PRESSURE: 74 MMHG | WEIGHT: 150.8 LBS | HEART RATE: 82 BPM | HEIGHT: 63 IN

## 2018-01-16 DIAGNOSIS — E86.0 DEHYDRATION, MILD: ICD-10-CM

## 2018-01-16 DIAGNOSIS — A60.00 GENITAL HERPES SIMPLEX, UNSPECIFIED SITE: ICD-10-CM

## 2018-01-16 DIAGNOSIS — I10 ESSENTIAL HYPERTENSION: ICD-10-CM

## 2018-01-16 DIAGNOSIS — M05.79 RHEUMATOID ARTHRITIS INVOLVING MULTIPLE SITES WITH POSITIVE RHEUMATOID FACTOR (HCC): ICD-10-CM

## 2018-01-16 DIAGNOSIS — R10.9 ACUTE ABDOMINAL PAIN: Primary | ICD-10-CM

## 2018-01-16 DIAGNOSIS — K52.9 ACUTE GASTROENTERITIS: ICD-10-CM

## 2018-01-16 DIAGNOSIS — E11.9 TYPE 2 DIABETES MELLITUS WITHOUT COMPLICATION, WITHOUT LONG-TERM CURRENT USE OF INSULIN (HCC): ICD-10-CM

## 2018-01-16 PROCEDURE — 4040F PNEUMOC VAC/ADMIN/RCVD: CPT | Performed by: FAMILY MEDICINE

## 2018-01-16 PROCEDURE — G8400 PT W/DXA NO RESULTS DOC: HCPCS | Performed by: FAMILY MEDICINE

## 2018-01-16 PROCEDURE — 1123F ACP DISCUSS/DSCN MKR DOCD: CPT | Performed by: FAMILY MEDICINE

## 2018-01-16 PROCEDURE — 3017F COLORECTAL CA SCREEN DOC REV: CPT | Performed by: FAMILY MEDICINE

## 2018-01-16 PROCEDURE — 1036F TOBACCO NON-USER: CPT | Performed by: FAMILY MEDICINE

## 2018-01-16 PROCEDURE — 3046F HEMOGLOBIN A1C LEVEL >9.0%: CPT | Performed by: FAMILY MEDICINE

## 2018-01-16 PROCEDURE — 1090F PRES/ABSN URINE INCON ASSESS: CPT | Performed by: FAMILY MEDICINE

## 2018-01-16 PROCEDURE — G8484 FLU IMMUNIZE NO ADMIN: HCPCS | Performed by: FAMILY MEDICINE

## 2018-01-16 PROCEDURE — 3014F SCREEN MAMMO DOC REV: CPT | Performed by: FAMILY MEDICINE

## 2018-01-16 PROCEDURE — 99213 OFFICE O/P EST LOW 20 MIN: CPT | Performed by: FAMILY MEDICINE

## 2018-01-16 PROCEDURE — G8417 CALC BMI ABV UP PARAM F/U: HCPCS | Performed by: FAMILY MEDICINE

## 2018-01-16 PROCEDURE — G8427 DOCREV CUR MEDS BY ELIG CLIN: HCPCS | Performed by: FAMILY MEDICINE

## 2018-01-16 RX ORDER — VALACYCLOVIR HYDROCHLORIDE 1 G/1
1000 TABLET, FILM COATED ORAL 2 TIMES DAILY
Qty: 20 TABLET | Refills: 0 | Status: SHIPPED | OUTPATIENT
Start: 2018-01-16 | End: 2018-01-23 | Stop reason: SDUPTHER

## 2018-01-16 NOTE — PATIENT INSTRUCTIONS
These can cause stomach upset. Talk to your doctor if you take daily aspirin for another health problem. When should you call for help? Call 911 anytime you think you may need emergency care. For example, call if:  ? · You passed out (lost consciousness). ? · You pass maroon or very bloody stools. ? · You vomit blood or what looks like coffee grounds. ? · You have new, severe belly pain. ?Call your doctor now or seek immediate medical care if:  ? · Your pain gets worse, especially if it becomes focused in one area of your belly. ? · You have a new or higher fever. ? · Your stools are black and look like tar, or they have streaks of blood. ? · You have unexpected vaginal bleeding. ? · You have symptoms of a urinary tract infection. These may include:  ¨ Pain when you urinate. ¨ Urinating more often than usual.  ¨ Blood in your urine. ? · You are dizzy or lightheaded, or you feel like you may faint. ? Watch closely for changes in your health, and be sure to contact your doctor if:  ? · You are not getting better after 1 day (24 hours). Where can you learn more? Go to https://[employer]. org and sign in to your [employer] account. Enter M815 in the [employer] box to learn more about \"Abdominal Pain: Care Instructions. \"     If you do not have an account, please click on the \"Sign Up Now\" link. Current as of: March 20, 2017  Content Version: 11.5  © 5036-6004 Healthwise, Incorporated. Care instructions adapted under license by [facility] ([facility]). If you have questions about a medical condition or this instruction, always ask your healthcare professional. [place] [address] any warranty or liability for your use of this information.

## 2018-01-16 NOTE — PROGRESS NOTES
03/27/2018    Lipid screen  01/09/2019    Potassium monitoring  01/09/2019    Creatinine monitoring  01/09/2019    Colon cancer screen colonoscopy  06/01/2020    Zostavax vaccine  Addressed    DEXA (modify frequency per FRAX score)  Addressed    Hepatitis C screen  Addressed

## 2018-01-17 ASSESSMENT — ENCOUNTER SYMPTOMS
SHORTNESS OF BREATH: 0
VOMITING: 1
CHEST TIGHTNESS: 0
BACK PAIN: 0
TROUBLE SWALLOWING: 0
COUGH: 0
CONSTIPATION: 0
ABDOMINAL PAIN: 1
DIARRHEA: 1
SORE THROAT: 0
NAUSEA: 0
RHINORRHEA: 0

## 2018-01-23 DIAGNOSIS — A60.00 GENITAL HERPES SIMPLEX, UNSPECIFIED SITE: ICD-10-CM

## 2018-01-23 RX ORDER — VALACYCLOVIR HYDROCHLORIDE 1 G/1
TABLET, FILM COATED ORAL
Qty: 20 TABLET | Refills: 0 | Status: SHIPPED | OUTPATIENT
Start: 2018-01-23 | End: 2018-11-02

## 2018-01-29 ENCOUNTER — OFFICE VISIT (OUTPATIENT)
Dept: FAMILY MEDICINE CLINIC | Age: 68
End: 2018-01-29
Payer: MEDICARE

## 2018-01-29 VITALS
DIASTOLIC BLOOD PRESSURE: 77 MMHG | WEIGHT: 150.2 LBS | HEART RATE: 79 BPM | SYSTOLIC BLOOD PRESSURE: 121 MMHG | BODY MASS INDEX: 26.61 KG/M2 | HEIGHT: 63 IN

## 2018-01-29 DIAGNOSIS — M05.79 RHEUMATOID ARTHRITIS INVOLVING MULTIPLE SITES WITH POSITIVE RHEUMATOID FACTOR (HCC): ICD-10-CM

## 2018-01-29 DIAGNOSIS — E11.9 TYPE 2 DIABETES MELLITUS WITHOUT COMPLICATION, WITHOUT LONG-TERM CURRENT USE OF INSULIN (HCC): Primary | ICD-10-CM

## 2018-01-29 DIAGNOSIS — E78.2 MIXED HYPERLIPIDEMIA: ICD-10-CM

## 2018-01-29 DIAGNOSIS — Z12.39 BREAST CANCER SCREENING: ICD-10-CM

## 2018-01-29 DIAGNOSIS — I10 ESSENTIAL HYPERTENSION: ICD-10-CM

## 2018-01-29 DIAGNOSIS — E11.9 COMPREHENSIVE DIABETIC FOOT EXAMINATION, TYPE 2 DM, ENCOUNTER FOR (HCC): ICD-10-CM

## 2018-01-29 LAB — HBA1C MFR BLD: 6.5 %

## 2018-01-29 PROCEDURE — 83036 HEMOGLOBIN GLYCOSYLATED A1C: CPT | Performed by: PHYSICIAN ASSISTANT

## 2018-01-29 PROCEDURE — 99214 OFFICE O/P EST MOD 30 MIN: CPT | Performed by: PHYSICIAN ASSISTANT

## 2018-01-29 ASSESSMENT — ENCOUNTER SYMPTOMS
VOMITING: 0
SHORTNESS OF BREATH: 0
DIARRHEA: 0
NAUSEA: 0
BACK PAIN: 0
ABDOMINAL PAIN: 0
CONSTIPATION: 0
COUGH: 0

## 2018-01-29 NOTE — PROGRESS NOTES
7.5-200 MG per tablet Take 1 tablet by mouth every 8 hours as needed for Pain . Earliest Fill Date: 5/15/17 30 tablet 0    methotrexate (RHEUMATREX) 2.5 MG chemo tablet       predniSONE (DELTASONE) 5 MG tablet Take 5 mg by mouth daily. No facility-administered encounter medications on file as of 1/29/2018. Medications, laboratory testing, imaging, consultation, and follow up as documented in this encounter.      Electronically signed by Raciel hCamberlain PA-C on 1/29/18 at 2:19 PM

## 2018-01-29 NOTE — PATIENT INSTRUCTIONS
Patient Education        Well Visit, Over 72: Care Instructions  Your Care Instructions    Physical exams can help you stay healthy. Your doctor has checked your overall health and may have suggested ways to take good care of yourself. He or she also may have recommended tests. At home, you can help prevent illness with healthy eating, regular exercise, and other steps. Follow-up care is a key part of your treatment and safety. Be sure to make and go to all appointments, and call your doctor if you are having problems. It's also a good idea to know your test results and keep a list of the medicines you take. How can you care for yourself at home? · Reach and stay at a healthy weight. This will lower your risk for many problems, such as obesity, diabetes, heart disease, and high blood pressure. · Get at least 30 minutes of exercise on most days of the week. Walking is a good choice. You also may want to do other activities, such as running, swimming, cycling, or playing tennis or team sports. · Do not smoke. Smoking can make health problems worse. If you need help quitting, talk to your doctor about stop-smoking programs and medicines. These can increase your chances of quitting for good. · Protect your skin from too much sun. When you're outdoors from 10 a.m. to 4 p.m., stay in the shade or cover up with clothing and a hat with a wide brim. Wear sunglasses that block UV rays. Even when it's cloudy, put broad-spectrum sunscreen (SPF 30 or higher) on any exposed skin. · See a dentist one or two times a year for checkups and to have your teeth cleaned. · Wear a seat belt in the car. · Limit alcohol to 2 drinks a day for men and 1 drink a day for women. Too much alcohol can cause health problems. Follow your doctor's advice about when to have certain tests. These tests can spot problems early. For men and women  · Cholesterol.  Your doctor will tell you how often to have this done based on your overall health test. Some experts recommend that you discuss the benefits and risks of the test with your doctor. · Abdominal aortic aneurysm. Ask your doctor whether you should have a test to check for an aneurysm. You may need a test if you ever smoked or if your parent, brother, sister, or child has had an aneurysm. When should you call for help? Watch closely for changes in your health, and be sure to contact your doctor if you have any problems or symptoms that concern you. Where can you learn more? Go to https://myRete.[employer]. org and sign in to your [facility] account. Enter Y262 in the [employer] box to learn more about \"Well Visit, Over 65: Care Instructions. \"     If you do not have an account, please click on the \"Sign Up Now\" link. Current as of: May 12, 2017  Content Version: 11.5  © 2312-7901 Healthwise, Incorporated. Care instructions adapted under license by [facility] ([facility]). If you have questions about a medical condition or this instruction, always ask your healthcare professional. [address] any warranty or liability for your use of this information.

## 2018-02-03 DIAGNOSIS — E11.9 TYPE 2 DIABETES MELLITUS WITHOUT COMPLICATION, WITHOUT LONG-TERM CURRENT USE OF INSULIN (HCC): ICD-10-CM

## 2018-03-07 DIAGNOSIS — E11.9 TYPE 2 DIABETES MELLITUS WITHOUT COMPLICATION, WITHOUT LONG-TERM CURRENT USE OF INSULIN (HCC): ICD-10-CM

## 2018-03-20 DIAGNOSIS — Z12.39 BREAST CANCER SCREENING: ICD-10-CM

## 2018-04-03 DIAGNOSIS — E11.9 TYPE 2 DIABETES MELLITUS WITHOUT COMPLICATION, WITHOUT LONG-TERM CURRENT USE OF INSULIN (HCC): ICD-10-CM

## 2018-04-03 LAB
CREATININE, URINE: 271.7
MICROALBUMIN/CREAT 24H UR: 17.2 MG/G{CREAT}
MICROALBUMIN/CREAT UR-RTO: 6.3

## 2018-04-25 ENCOUNTER — OFFICE VISIT (OUTPATIENT)
Dept: FAMILY MEDICINE CLINIC | Age: 68
End: 2018-04-25
Payer: MEDICARE

## 2018-04-25 VITALS
HEIGHT: 63 IN | WEIGHT: 146.6 LBS | SYSTOLIC BLOOD PRESSURE: 116 MMHG | DIASTOLIC BLOOD PRESSURE: 72 MMHG | BODY MASS INDEX: 25.98 KG/M2 | HEART RATE: 67 BPM

## 2018-04-25 DIAGNOSIS — Z00.00 ROUTINE GENERAL MEDICAL EXAMINATION AT A HEALTH CARE FACILITY: Primary | ICD-10-CM

## 2018-04-25 PROCEDURE — 4040F PNEUMOC VAC/ADMIN/RCVD: CPT | Performed by: PHYSICIAN ASSISTANT

## 2018-04-25 PROCEDURE — G0438 PPPS, INITIAL VISIT: HCPCS | Performed by: PHYSICIAN ASSISTANT

## 2018-04-25 ASSESSMENT — ANXIETY QUESTIONNAIRES: GAD7 TOTAL SCORE: 0

## 2018-04-25 ASSESSMENT — LIFESTYLE VARIABLES: HOW OFTEN DO YOU HAVE A DRINK CONTAINING ALCOHOL: 0

## 2018-04-25 ASSESSMENT — PATIENT HEALTH QUESTIONNAIRE - PHQ9: SUM OF ALL RESPONSES TO PHQ QUESTIONS 1-9: 0

## 2018-05-30 DIAGNOSIS — I10 ESSENTIAL HYPERTENSION: ICD-10-CM

## 2018-05-30 RX ORDER — LISINOPRIL 20 MG/1
TABLET ORAL
Qty: 90 TABLET | Refills: 1 | Status: SHIPPED | OUTPATIENT
Start: 2018-05-30 | End: 2018-11-27 | Stop reason: SDUPTHER

## 2018-06-11 DIAGNOSIS — E11.9 TYPE 2 DIABETES MELLITUS WITHOUT COMPLICATION, WITHOUT LONG-TERM CURRENT USE OF INSULIN (HCC): ICD-10-CM

## 2018-06-11 RX ORDER — ATORVASTATIN CALCIUM 10 MG/1
TABLET, FILM COATED ORAL
Qty: 90 TABLET | Refills: 1 | Status: SHIPPED | OUTPATIENT
Start: 2018-06-11 | End: 2018-12-11 | Stop reason: SDUPTHER

## 2018-06-25 ENCOUNTER — OFFICE VISIT (OUTPATIENT)
Dept: FAMILY MEDICINE CLINIC | Age: 68
End: 2018-06-25
Payer: MEDICARE

## 2018-06-25 VITALS
TEMPERATURE: 98.1 F | WEIGHT: 148.4 LBS | HEIGHT: 63 IN | HEART RATE: 74 BPM | DIASTOLIC BLOOD PRESSURE: 78 MMHG | SYSTOLIC BLOOD PRESSURE: 121 MMHG | BODY MASS INDEX: 26.29 KG/M2 | OXYGEN SATURATION: 97 %

## 2018-06-25 DIAGNOSIS — E11.9 TYPE 2 DIABETES MELLITUS WITHOUT COMPLICATION, WITHOUT LONG-TERM CURRENT USE OF INSULIN (HCC): ICD-10-CM

## 2018-06-25 DIAGNOSIS — I10 ESSENTIAL HYPERTENSION: Primary | ICD-10-CM

## 2018-06-25 DIAGNOSIS — M05.79 RHEUMATOID ARTHRITIS INVOLVING MULTIPLE SITES WITH POSITIVE RHEUMATOID FACTOR (HCC): ICD-10-CM

## 2018-06-25 DIAGNOSIS — E78.2 MIXED HYPERLIPIDEMIA: ICD-10-CM

## 2018-06-25 PROCEDURE — 1036F TOBACCO NON-USER: CPT | Performed by: PHYSICIAN ASSISTANT

## 2018-06-25 PROCEDURE — 1123F ACP DISCUSS/DSCN MKR DOCD: CPT | Performed by: PHYSICIAN ASSISTANT

## 2018-06-25 PROCEDURE — 2022F DILAT RTA XM EVC RTNOPTHY: CPT | Performed by: PHYSICIAN ASSISTANT

## 2018-06-25 PROCEDURE — 1090F PRES/ABSN URINE INCON ASSESS: CPT | Performed by: PHYSICIAN ASSISTANT

## 2018-06-25 PROCEDURE — G8427 DOCREV CUR MEDS BY ELIG CLIN: HCPCS | Performed by: PHYSICIAN ASSISTANT

## 2018-06-25 PROCEDURE — 3044F HG A1C LEVEL LT 7.0%: CPT | Performed by: PHYSICIAN ASSISTANT

## 2018-06-25 PROCEDURE — 4040F PNEUMOC VAC/ADMIN/RCVD: CPT | Performed by: PHYSICIAN ASSISTANT

## 2018-06-25 PROCEDURE — 99213 OFFICE O/P EST LOW 20 MIN: CPT | Performed by: PHYSICIAN ASSISTANT

## 2018-06-25 PROCEDURE — 3017F COLORECTAL CA SCREEN DOC REV: CPT | Performed by: PHYSICIAN ASSISTANT

## 2018-06-25 PROCEDURE — G8417 CALC BMI ABV UP PARAM F/U: HCPCS | Performed by: PHYSICIAN ASSISTANT

## 2018-06-25 PROCEDURE — 3014F SCREEN MAMMO DOC REV: CPT | Performed by: PHYSICIAN ASSISTANT

## 2018-06-25 PROCEDURE — G8400 PT W/DXA NO RESULTS DOC: HCPCS | Performed by: PHYSICIAN ASSISTANT

## 2018-06-25 ASSESSMENT — ENCOUNTER SYMPTOMS
SHORTNESS OF BREATH: 0
NAUSEA: 0
BACK PAIN: 0
COUGH: 0
VOMITING: 0
DIARRHEA: 0
CONSTIPATION: 0

## 2018-07-25 ENCOUNTER — TELEPHONE (OUTPATIENT)
Dept: FAMILY MEDICINE CLINIC | Age: 68
End: 2018-07-25

## 2018-07-30 ENCOUNTER — OFFICE VISIT (OUTPATIENT)
Dept: FAMILY MEDICINE CLINIC | Age: 68
End: 2018-07-30
Payer: MEDICARE

## 2018-07-30 VITALS
RESPIRATION RATE: 20 BRPM | WEIGHT: 149 LBS | HEART RATE: 68 BPM | SYSTOLIC BLOOD PRESSURE: 117 MMHG | DIASTOLIC BLOOD PRESSURE: 70 MMHG | HEIGHT: 63 IN | OXYGEN SATURATION: 99 % | BODY MASS INDEX: 26.4 KG/M2 | TEMPERATURE: 98 F

## 2018-07-30 DIAGNOSIS — H53.8 FLASHING LIGHTS: ICD-10-CM

## 2018-07-30 DIAGNOSIS — L28.2 PRURITIC RASH: Primary | ICD-10-CM

## 2018-07-30 PROCEDURE — 3017F COLORECTAL CA SCREEN DOC REV: CPT | Performed by: PHYSICIAN ASSISTANT

## 2018-07-30 PROCEDURE — 1090F PRES/ABSN URINE INCON ASSESS: CPT | Performed by: PHYSICIAN ASSISTANT

## 2018-07-30 PROCEDURE — 1101F PT FALLS ASSESS-DOCD LE1/YR: CPT | Performed by: PHYSICIAN ASSISTANT

## 2018-07-30 PROCEDURE — G8400 PT W/DXA NO RESULTS DOC: HCPCS | Performed by: PHYSICIAN ASSISTANT

## 2018-07-30 PROCEDURE — G8427 DOCREV CUR MEDS BY ELIG CLIN: HCPCS | Performed by: PHYSICIAN ASSISTANT

## 2018-07-30 PROCEDURE — G8417 CALC BMI ABV UP PARAM F/U: HCPCS | Performed by: PHYSICIAN ASSISTANT

## 2018-07-30 PROCEDURE — 3014F SCREEN MAMMO DOC REV: CPT | Performed by: PHYSICIAN ASSISTANT

## 2018-07-30 PROCEDURE — 1123F ACP DISCUSS/DSCN MKR DOCD: CPT | Performed by: PHYSICIAN ASSISTANT

## 2018-07-30 PROCEDURE — 4040F PNEUMOC VAC/ADMIN/RCVD: CPT | Performed by: PHYSICIAN ASSISTANT

## 2018-07-30 PROCEDURE — 1036F TOBACCO NON-USER: CPT | Performed by: PHYSICIAN ASSISTANT

## 2018-07-30 PROCEDURE — 99213 OFFICE O/P EST LOW 20 MIN: CPT | Performed by: PHYSICIAN ASSISTANT

## 2018-07-30 ASSESSMENT — ENCOUNTER SYMPTOMS
BLURRED VISION: 0
SHORTNESS OF BREATH: 0
VOMITING: 0
EYE ITCHING: 0
EYE REDNESS: 0
RHINORRHEA: 0
EYE DISCHARGE: 0
NAUSEA: 0

## 2018-07-30 NOTE — PROGRESS NOTES
Visit Information    Have you changed or started any medications since your last visit including any over-the-counter medicines, vitamins, or herbal medicines? no   Have you stopped taking any of your medications? Is so, why? -  no  Are you having any side effects from any of your medications? - no    Have you seen any other physician or provider since your last visit?  no   Have you had any other diagnostic tests since your last visit?  no   Have you been seen in the emergency room and/or had an admission in a hospital since we last saw you?  no   Have you had your routine dental cleaning in the past 6 months?  no     Do you have an active MyChart account? If no, what is the barrier?   Yes    Patient Care Team:  Amada Rodriguez MD as PCP - General (Family Medicine)  Amada Rodriguez MD as PCP - [facility] Attributed Provider    Medical History Review  Past Medical, Family, and Social History reviewed and does not contribute to the patient presenting condition    Health Maintenance   Topic Date Due    Diabetic retinal exam  03/02/1961    Flu vaccine (1) 12/26/2018 (Originally 9/1/2018)    Pneumococcal low/med risk (1 of 2 - PCV13) 12/26/2018 (Originally 3/2/2016)    DTaP/Tdap/Td vaccine (1 - Tdap) 04/25/2019 (Originally 3/2/1970)    Shingles Vaccine (1 of 2 - 2 Dose Series) 04/25/2019 (Originally 3/2/2001)    Lipid screen  01/09/2019    Potassium monitoring  01/09/2019    Creatinine monitoring  01/09/2019    Diabetic foot exam  01/29/2019    A1C test (Diabetic or Prediabetic)  01/29/2019    Diabetic microalbuminuria test  01/31/2019    Breast cancer screen  03/15/2019    Colon cancer screen colonoscopy  06/01/2020    DEXA (modify frequency per FRAX score)  Addressed    Hepatitis C screen  Addressed

## 2018-07-30 NOTE — PROGRESS NOTES
eye. Pertinent negatives include no blurred vision, eye discharge, eye redness, fever, itching, nausea or vomiting. Patient Active Problem List   Diagnosis    Hyperlipidemia    DJD (degenerative joint disease) of cervical spine    Meningioma (HCC)    FH: breast cancer in first degree relative    Fibrocystic breast disease in female    Essential hypertension    Non morbid obesity due to excess calories    Type 2 diabetes mellitus without complication, without long-term current use of insulin (HCC)    Benign neoplasm of cerebral meninges (HCC)    FH: breast cancer in relative when <39years old    Rheumatoid arthritis involving multiple sites with positive rheumatoid factor ([facility] [address]    Genital herpes simplex       Health Maintenance Due   Topic Date Due    Diabetic retinal exam  03/02/1961       Allergies   Allergen Reactions    Amoxicillin-Pot Clavulanate     Benadryl [Diphenhydramine Hcl] Rash         Current Outpatient Prescriptions   Medication Sig Dispense Refill    hydrocortisone 2.5 % cream Apply topically 2 times daily. 1 Tube 0    atorvastatin (LIPITOR) 10 MG tablet TAKE 1 TABLET DAILY 90 tablet 1    metFORMIN (GLUCOPHAGE) 500 MG tablet TAKE ONE TABLET BY MOUTH TWICE A DAY WITH MEALS 60 tablet 1    lisinopril (PRINIVIL;ZESTRIL) 20 MG tablet TAKE 1 TABLET DAILY 90 tablet 1    valACYclovir (VALTREX) 1 g tablet TAKE ONE TABLET BY MOUTH TWICE A DAY 20 tablet 0    spironolactone-hydrochlorothiazide (ALDACTAZIDE) 25-25 MG per tablet       amLODIPine (NORVASC) 5 MG tablet TAKE 1 TABLET DAILY 90 tablet 3    HYDROcodone-acetaminophen (NORCO) 5-325 MG per tablet Take 1 tablet by mouth nightly as needed for Pain . 30 tablet 0    methotrexate (RHEUMATREX) 2.5 MG chemo tablet       predniSONE (DELTASONE) 5 MG tablet Take 5 mg by mouth daily. No current facility-administered medications for this visit.         Social History   Substance Use Topics    Smoking status: Never Smoker    BMP:    Lab Results   Component Value Date     01/09/2018    K 4.2 01/09/2018     01/09/2018    CO2 28 01/09/2018    BUN 18 01/09/2018    CREATININE 0.90 01/09/2018    GLUCOSE 112 01/09/2018       HEMOGLOBIN A1C:   Lab Results   Component Value Date    LABA1C 6.5 01/29/2018       FASTING LIPID PANEL:  Lab Results   Component Value Date    CHOL 198 01/09/2018    HDL 66 (H) 01/09/2018    TRIG 160 (H) 01/09/2018       ASSESSMENT AND PLAN:  Shannan Alfaro was seen today for rash and eye problem. Diagnoses and all orders for this visit:    Pruritic rash  -     hydrocortisone 2.5 % cream; Apply topically 2 times daily. Flashing lights      FOLLOW UP AND INSTRUCTIONS:  Return in about 3 months (around 10/30/2018) for HLD, DM, HTN. · Discussed use, benefit, and side effects of prescribed medications. Barriers to medication compliance addressed. All patient questions answered. Pt voiced understanding. · Patient given educational materials - see patient instructions    Electronically signed by Cristobal Swift PA-C on 7/30/18 at 2:28 PM    This note is created with the assistance of a speech-recognition program. While intending to generate a document that actually reflects the content of the visit, the document can still have some mistakes which may not have been identified and corrected by editing.

## 2018-07-31 ASSESSMENT — ENCOUNTER SYMPTOMS
DIARRHEA: 0
CONSTIPATION: 0
COUGH: 0
BACK PAIN: 0

## 2018-10-25 ENCOUNTER — OFFICE VISIT (OUTPATIENT)
Dept: FAMILY MEDICINE CLINIC | Age: 68
End: 2018-10-25
Payer: MEDICARE

## 2018-10-25 VITALS
WEIGHT: 147.4 LBS | BODY MASS INDEX: 26.12 KG/M2 | SYSTOLIC BLOOD PRESSURE: 115 MMHG | HEART RATE: 61 BPM | OXYGEN SATURATION: 99 % | DIASTOLIC BLOOD PRESSURE: 76 MMHG | TEMPERATURE: 97.6 F | HEIGHT: 63 IN | RESPIRATION RATE: 20 BRPM

## 2018-10-25 DIAGNOSIS — E66.3 OVERWEIGHT (BMI 25.0-29.9): ICD-10-CM

## 2018-10-25 DIAGNOSIS — I10 ESSENTIAL HYPERTENSION: Primary | ICD-10-CM

## 2018-10-25 DIAGNOSIS — D32.9 MENINGIOMA (HCC): ICD-10-CM

## 2018-10-25 DIAGNOSIS — E11.9 TYPE 2 DIABETES MELLITUS WITHOUT COMPLICATION, WITHOUT LONG-TERM CURRENT USE OF INSULIN (HCC): ICD-10-CM

## 2018-10-25 DIAGNOSIS — E78.2 MIXED HYPERLIPIDEMIA: ICD-10-CM

## 2018-10-25 PROCEDURE — 4040F PNEUMOC VAC/ADMIN/RCVD: CPT | Performed by: PHYSICIAN ASSISTANT

## 2018-10-25 PROCEDURE — 3017F COLORECTAL CA SCREEN DOC REV: CPT | Performed by: PHYSICIAN ASSISTANT

## 2018-10-25 PROCEDURE — G8484 FLU IMMUNIZE NO ADMIN: HCPCS | Performed by: PHYSICIAN ASSISTANT

## 2018-10-25 PROCEDURE — 3014F SCREEN MAMMO DOC REV: CPT | Performed by: PHYSICIAN ASSISTANT

## 2018-10-25 PROCEDURE — 1101F PT FALLS ASSESS-DOCD LE1/YR: CPT | Performed by: PHYSICIAN ASSISTANT

## 2018-10-25 PROCEDURE — G8400 PT W/DXA NO RESULTS DOC: HCPCS | Performed by: PHYSICIAN ASSISTANT

## 2018-10-25 PROCEDURE — 99214 OFFICE O/P EST MOD 30 MIN: CPT | Performed by: PHYSICIAN ASSISTANT

## 2018-10-25 PROCEDURE — 1123F ACP DISCUSS/DSCN MKR DOCD: CPT | Performed by: PHYSICIAN ASSISTANT

## 2018-10-25 PROCEDURE — G8417 CALC BMI ABV UP PARAM F/U: HCPCS | Performed by: PHYSICIAN ASSISTANT

## 2018-10-25 PROCEDURE — 1036F TOBACCO NON-USER: CPT | Performed by: PHYSICIAN ASSISTANT

## 2018-10-25 PROCEDURE — 2022F DILAT RTA XM EVC RTNOPTHY: CPT | Performed by: PHYSICIAN ASSISTANT

## 2018-10-25 PROCEDURE — 3044F HG A1C LEVEL LT 7.0%: CPT | Performed by: PHYSICIAN ASSISTANT

## 2018-10-25 PROCEDURE — 1090F PRES/ABSN URINE INCON ASSESS: CPT | Performed by: PHYSICIAN ASSISTANT

## 2018-10-25 PROCEDURE — G8427 DOCREV CUR MEDS BY ELIG CLIN: HCPCS | Performed by: PHYSICIAN ASSISTANT

## 2018-10-26 ENCOUNTER — TELEPHONE (OUTPATIENT)
Dept: FAMILY MEDICINE CLINIC | Age: 68
End: 2018-10-26

## 2018-10-26 NOTE — TELEPHONE ENCOUNTER
Left voice mail to see how pt visit went yesterday and if she has questions or concerns to call the office back

## 2018-10-27 ASSESSMENT — ENCOUNTER SYMPTOMS
COUGH: 0
NAUSEA: 0
SHORTNESS OF BREATH: 0
BACK PAIN: 0
CONSTIPATION: 0
VOMITING: 0
DIARRHEA: 0

## 2018-11-02 ENCOUNTER — OFFICE VISIT (OUTPATIENT)
Dept: FAMILY MEDICINE CLINIC | Age: 68
End: 2018-11-02
Payer: MEDICARE

## 2018-11-02 VITALS
HEART RATE: 69 BPM | OXYGEN SATURATION: 98 % | DIASTOLIC BLOOD PRESSURE: 80 MMHG | BODY MASS INDEX: 25.76 KG/M2 | TEMPERATURE: 98.1 F | HEIGHT: 63 IN | WEIGHT: 145.4 LBS | SYSTOLIC BLOOD PRESSURE: 126 MMHG

## 2018-11-02 DIAGNOSIS — B02.9 HERPES ZOSTER WITHOUT COMPLICATION: Primary | ICD-10-CM

## 2018-11-02 DIAGNOSIS — L28.2 PRURITIC RASH: ICD-10-CM

## 2018-11-02 PROCEDURE — 3017F COLORECTAL CA SCREEN DOC REV: CPT | Performed by: PHYSICIAN ASSISTANT

## 2018-11-02 PROCEDURE — 1090F PRES/ABSN URINE INCON ASSESS: CPT | Performed by: PHYSICIAN ASSISTANT

## 2018-11-02 PROCEDURE — 1123F ACP DISCUSS/DSCN MKR DOCD: CPT | Performed by: PHYSICIAN ASSISTANT

## 2018-11-02 PROCEDURE — G8427 DOCREV CUR MEDS BY ELIG CLIN: HCPCS | Performed by: PHYSICIAN ASSISTANT

## 2018-11-02 PROCEDURE — 1101F PT FALLS ASSESS-DOCD LE1/YR: CPT | Performed by: PHYSICIAN ASSISTANT

## 2018-11-02 PROCEDURE — G8417 CALC BMI ABV UP PARAM F/U: HCPCS | Performed by: PHYSICIAN ASSISTANT

## 2018-11-02 PROCEDURE — 1036F TOBACCO NON-USER: CPT | Performed by: PHYSICIAN ASSISTANT

## 2018-11-02 PROCEDURE — 4040F PNEUMOC VAC/ADMIN/RCVD: CPT | Performed by: PHYSICIAN ASSISTANT

## 2018-11-02 PROCEDURE — 3014F SCREEN MAMMO DOC REV: CPT | Performed by: PHYSICIAN ASSISTANT

## 2018-11-02 PROCEDURE — G8484 FLU IMMUNIZE NO ADMIN: HCPCS | Performed by: PHYSICIAN ASSISTANT

## 2018-11-02 PROCEDURE — G8400 PT W/DXA NO RESULTS DOC: HCPCS | Performed by: PHYSICIAN ASSISTANT

## 2018-11-02 PROCEDURE — 99213 OFFICE O/P EST LOW 20 MIN: CPT | Performed by: PHYSICIAN ASSISTANT

## 2018-11-02 RX ORDER — TRAMADOL HYDROCHLORIDE 50 MG/1
50 TABLET ORAL EVERY 6 HOURS PRN
Qty: 7 TABLET | Refills: 0 | Status: SHIPPED | OUTPATIENT
Start: 2018-11-02 | End: 2018-11-05

## 2018-11-02 RX ORDER — GABAPENTIN 100 MG/1
100 CAPSULE ORAL 2 TIMES DAILY
Qty: 20 CAPSULE | Refills: 0 | Status: SHIPPED | OUTPATIENT
Start: 2018-11-02 | End: 2018-11-09 | Stop reason: SDUPTHER

## 2018-11-02 RX ORDER — ACYCLOVIR 400 MG/1
400 TABLET ORAL 2 TIMES DAILY
Qty: 20 TABLET | Refills: 0 | Status: SHIPPED | OUTPATIENT
Start: 2018-11-02 | End: 2018-11-12

## 2018-11-04 ASSESSMENT — ENCOUNTER SYMPTOMS
CONSTIPATION: 0
VOMITING: 0
BACK PAIN: 0
NAUSEA: 0
DIARRHEA: 0
COUGH: 0
SHORTNESS OF BREATH: 0

## 2018-11-05 ENCOUNTER — TELEPHONE (OUTPATIENT)
Dept: FAMILY MEDICINE CLINIC | Age: 68
End: 2018-11-05

## 2018-11-05 DIAGNOSIS — L30.9 DERMATITIS: Primary | ICD-10-CM

## 2018-11-05 DIAGNOSIS — B02.9 HERPES ZOSTER WITHOUT COMPLICATION: ICD-10-CM

## 2018-11-05 RX ORDER — HYDROCODONE BITARTRATE AND ACETAMINOPHEN 5; 325 MG/1; MG/1
1 TABLET ORAL EVERY 6 HOURS PRN
Qty: 10 TABLET | Refills: 0 | Status: SHIPPED | OUTPATIENT
Start: 2018-11-05 | End: 2018-11-08

## 2018-11-16 ENCOUNTER — OFFICE VISIT (OUTPATIENT)
Dept: PRIMARY CARE CLINIC | Age: 68
End: 2018-11-16
Payer: MEDICARE

## 2018-11-16 VITALS
DIASTOLIC BLOOD PRESSURE: 75 MMHG | HEART RATE: 80 BPM | HEIGHT: 63 IN | OXYGEN SATURATION: 95 % | RESPIRATION RATE: 20 BRPM | SYSTOLIC BLOOD PRESSURE: 116 MMHG | TEMPERATURE: 98.2 F | WEIGHT: 145 LBS | BODY MASS INDEX: 25.69 KG/M2

## 2018-11-16 DIAGNOSIS — B02.29 POST HERPETIC NEURALGIA: ICD-10-CM

## 2018-11-16 DIAGNOSIS — B02.9 HERPES ZOSTER WITHOUT COMPLICATION: Primary | ICD-10-CM

## 2018-11-16 DIAGNOSIS — R11.0 NAUSEA: ICD-10-CM

## 2018-11-16 PROCEDURE — 4040F PNEUMOC VAC/ADMIN/RCVD: CPT | Performed by: PHYSICIAN ASSISTANT

## 2018-11-16 PROCEDURE — G8484 FLU IMMUNIZE NO ADMIN: HCPCS | Performed by: PHYSICIAN ASSISTANT

## 2018-11-16 PROCEDURE — G8417 CALC BMI ABV UP PARAM F/U: HCPCS | Performed by: PHYSICIAN ASSISTANT

## 2018-11-16 PROCEDURE — 3014F SCREEN MAMMO DOC REV: CPT | Performed by: PHYSICIAN ASSISTANT

## 2018-11-16 PROCEDURE — 3017F COLORECTAL CA SCREEN DOC REV: CPT | Performed by: PHYSICIAN ASSISTANT

## 2018-11-16 PROCEDURE — G8427 DOCREV CUR MEDS BY ELIG CLIN: HCPCS | Performed by: PHYSICIAN ASSISTANT

## 2018-11-16 PROCEDURE — 1101F PT FALLS ASSESS-DOCD LE1/YR: CPT | Performed by: PHYSICIAN ASSISTANT

## 2018-11-16 PROCEDURE — G8400 PT W/DXA NO RESULTS DOC: HCPCS | Performed by: PHYSICIAN ASSISTANT

## 2018-11-16 PROCEDURE — 1123F ACP DISCUSS/DSCN MKR DOCD: CPT | Performed by: PHYSICIAN ASSISTANT

## 2018-11-16 PROCEDURE — 1090F PRES/ABSN URINE INCON ASSESS: CPT | Performed by: PHYSICIAN ASSISTANT

## 2018-11-16 PROCEDURE — 1036F TOBACCO NON-USER: CPT | Performed by: PHYSICIAN ASSISTANT

## 2018-11-16 PROCEDURE — 99213 OFFICE O/P EST LOW 20 MIN: CPT | Performed by: PHYSICIAN ASSISTANT

## 2018-11-16 RX ORDER — HYDROCODONE BITARTRATE AND ACETAMINOPHEN 5; 325 MG/1; MG/1
1 TABLET ORAL EVERY 4 HOURS PRN
Qty: 18 TABLET | Refills: 0 | Status: SHIPPED | OUTPATIENT
Start: 2018-11-16 | End: 2018-11-19

## 2018-11-16 RX ORDER — ONDANSETRON 4 MG/1
4 TABLET, FILM COATED ORAL EVERY 12 HOURS PRN
Qty: 10 TABLET | Refills: 0 | Status: SHIPPED | OUTPATIENT
Start: 2018-11-16 | End: 2018-11-21

## 2018-11-16 RX ORDER — GABAPENTIN 100 MG/1
CAPSULE ORAL
Qty: 20 CAPSULE | Refills: 0 | Status: SHIPPED | OUTPATIENT
Start: 2018-11-16 | End: 2019-02-26 | Stop reason: ALTCHOICE

## 2018-11-16 RX ORDER — ACYCLOVIR 400 MG/1
400 TABLET ORAL
COMMUNITY

## 2018-11-16 ASSESSMENT — ENCOUNTER SYMPTOMS
VOMITING: 1
NAUSEA: 1

## 2018-11-16 NOTE — PROGRESS NOTES
Visit Information    Have you changed or started any medications since your last visit including any over-the-counter medicines, vitamins, or herbal medicines? no   Have you stopped taking any of your medications? Is so, why? -  no  Are you having any side effects from any of your medications? - no    Have you seen any other physician or provider since your last visit?  no   Have you had any other diagnostic tests since your last visit?  no   Have you been seen in the emergency room and/or had an admission in a hospital since we last saw you?  no   Have you had your routine dental cleaning in the past 6 months?  no     Do you have an active MyChart account? If no, what is the barrier?   Yes    Patient Care Team:  Grupo Forbes PA-C as PCP - General  Grupo Forbes PA-C as PCP - [facility] Attributed Provider    Medical History Review  Past Medical, Family, and Social History reviewed and does not contribute to the patient presenting condition    Health Maintenance   Topic Date Due    Flu vaccine (1) 12/26/2018 (Originally 9/1/2018)    Pneumococcal low/med risk (1 of 2 - PCV13) 12/26/2018 (Originally 3/2/2016)    Diabetic retinal exam  01/08/2019 (Originally [birth year]/2/1961)    DTaP/Tdap/Td vaccine (1 - Tdap) 04/25/2019 (Originally 3/2/1970)    Shingles Vaccine (1 of 2 - 2 Dose Series) 04/25/2019 (Originally 3/2/2001)    Lipid screen  01/09/2019    Potassium monitoring  01/09/2019    Creatinine monitoring  01/09/2019    Diabetic foot exam  01/29/2019    A1C test (Diabetic or Prediabetic)  01/29/2019    Diabetic microalbuminuria test  01/31/2019    Breast cancer screen  03/15/2019    Colon cancer screen colonoscopy  06/09/2024    DEXA (modify frequency per FRAX score)  Addressed    Hepatitis C screen  Addressed

## 2018-11-24 ASSESSMENT — ENCOUNTER SYMPTOMS
CONSTIPATION: 0
DIARRHEA: 0
SHORTNESS OF BREATH: 0
BACK PAIN: 0
COUGH: 0

## 2018-11-27 DIAGNOSIS — I10 ESSENTIAL HYPERTENSION: ICD-10-CM

## 2018-11-27 RX ORDER — LISINOPRIL 20 MG/1
TABLET ORAL
Qty: 90 TABLET | Refills: 1 | Status: SHIPPED | OUTPATIENT
Start: 2018-11-27 | End: 2019-04-29 | Stop reason: SDUPTHER

## 2018-12-11 RX ORDER — ATORVASTATIN CALCIUM 10 MG/1
TABLET, FILM COATED ORAL
Qty: 90 TABLET | Refills: 1 | Status: SHIPPED | OUTPATIENT
Start: 2018-12-11 | End: 2019-04-29 | Stop reason: SDUPTHER

## 2018-12-11 NOTE — TELEPHONE ENCOUNTER
of cerebral meninges (HCC)     FH: breast cancer in relative when <39years old     Rheumatoid arthritis involving multiple sites with positive rheumatoid factor (HCC)     Genital herpes simplex

## 2019-02-14 RX ORDER — AMLODIPINE BESYLATE 5 MG/1
5 TABLET ORAL DAILY
Qty: 90 TABLET | Refills: 0 | Status: SHIPPED | OUTPATIENT
Start: 2019-02-14 | End: 2019-04-29 | Stop reason: SDUPTHER

## 2019-02-26 ENCOUNTER — OFFICE VISIT (OUTPATIENT)
Dept: PRIMARY CARE CLINIC | Age: 69
End: 2019-02-26
Payer: MEDICARE

## 2019-02-26 VITALS
DIASTOLIC BLOOD PRESSURE: 75 MMHG | BODY MASS INDEX: 25.33 KG/M2 | SYSTOLIC BLOOD PRESSURE: 111 MMHG | OXYGEN SATURATION: 98 % | HEART RATE: 80 BPM | WEIGHT: 143 LBS

## 2019-02-26 DIAGNOSIS — M79.2 RADICULAR PAIN OF LEFT UPPER EXTREMITY: ICD-10-CM

## 2019-02-26 DIAGNOSIS — E66.3 OVERWEIGHT (BMI 25.0-29.9): ICD-10-CM

## 2019-02-26 DIAGNOSIS — Z12.31 ENCOUNTER FOR SCREENING MAMMOGRAM FOR BREAST CANCER: ICD-10-CM

## 2019-02-26 DIAGNOSIS — D32.0 BENIGN NEOPLASM OF CEREBRAL MENINGES (HCC): ICD-10-CM

## 2019-02-26 DIAGNOSIS — M05.79 RHEUMATOID ARTHRITIS INVOLVING MULTIPLE SITES WITH POSITIVE RHEUMATOID FACTOR (HCC): ICD-10-CM

## 2019-02-26 DIAGNOSIS — M54.2 ACUTE NECK PAIN: Primary | ICD-10-CM

## 2019-02-26 PROCEDURE — 99214 OFFICE O/P EST MOD 30 MIN: CPT | Performed by: PHYSICIAN ASSISTANT

## 2019-02-26 RX ORDER — PREGABALIN 50 MG/1
50 CAPSULE ORAL 2 TIMES DAILY
Qty: 42 CAPSULE | Refills: 0 | Status: SHIPPED | OUTPATIENT
Start: 2019-02-26 | End: 2019-04-29 | Stop reason: ALTCHOICE

## 2019-02-26 ASSESSMENT — ENCOUNTER SYMPTOMS
DIARRHEA: 0
CONSTIPATION: 0
VOMITING: 0
COUGH: 0
TROUBLE SWALLOWING: 0
BACK PAIN: 0
NAUSEA: 0
SHORTNESS OF BREATH: 0
WHEEZING: 0

## 2019-02-26 ASSESSMENT — PATIENT HEALTH QUESTIONNAIRE - PHQ9
SUM OF ALL RESPONSES TO PHQ QUESTIONS 1-9: 0
SUM OF ALL RESPONSES TO PHQ9 QUESTIONS 1 & 2: 0
SUM OF ALL RESPONSES TO PHQ QUESTIONS 1-9: 0
2. FEELING DOWN, DEPRESSED OR HOPELESS: 0
1. LITTLE INTEREST OR PLEASURE IN DOING THINGS: 0

## 2019-03-05 ENCOUNTER — TELEPHONE (OUTPATIENT)
Dept: PRIMARY CARE CLINIC | Age: 69
End: 2019-03-05

## 2019-03-21 DIAGNOSIS — Z12.31 ENCOUNTER FOR SCREENING MAMMOGRAM FOR BREAST CANCER: ICD-10-CM

## 2019-04-12 DIAGNOSIS — E11.9 TYPE 2 DIABETES MELLITUS WITHOUT COMPLICATION, WITHOUT LONG-TERM CURRENT USE OF INSULIN (HCC): ICD-10-CM

## 2019-04-12 NOTE — TELEPHONE ENCOUNTER
Last visit: 02/26/2019  Last Med refill: 01/09/2019  Does patient have enough medication for 72 hours: Yes    Next Visit Date:  Future Appointments   Date Time Provider Augustina Lopez   4/29/2019  9:00 AM Jovanny Harding PA-C [facility] Maintenance   Topic Date Due    Pneumococcal 65+ years Vaccine (1 of 2 - PCV13) 03/02/2016    Lipid screen  01/09/2019    Diabetic foot exam  01/29/2019    A1C test (Diabetic or Prediabetic)  01/29/2019    Diabetic microalbuminuria test  01/31/2019    DTaP/Tdap/Td vaccine (1 - Tdap) 04/25/2019 (Originally 3/2/1970)    Shingles Vaccine (1 of 2) 04/25/2019 (Originally 3/2/2001)    Flu vaccine (Season Ended) 09/01/2019    Diabetic retinal exam  11/03/2019    Potassium monitoring  02/25/2020    Creatinine monitoring  02/25/2020    Breast cancer screen  03/18/2020    Colon cancer screen colonoscopy  06/09/2024    DEXA (modify frequency per FRAX score)  Addressed    Hepatitis C screen  Addressed       Hemoglobin A1C (%)   Date Value   01/29/2018 6.5   03/27/2017 7.0   12/28/2016 7.0             ( goal A1C is < 7)   No results found for: LABMICR  LDL Calculated (mg/dL)   Date Value   01/09/2018 100       (goal LDL is <100)   AST (U/L)   Date Value   01/09/2018 19     ALT (U/L)   Date Value   01/09/2018 26     BUN (mg/dL)   Date Value   01/09/2018 18 (H)     BP Readings from Last 3 Encounters:   02/26/19 111/75   11/16/18 116/75   11/02/18 126/80          (goal 120/80)    All Future Testing planned in [facility]  Lab Frequency Next Occurrence   XR CERVICAL SPINE (2-3 VIEWS) Once 05/01/2019               Patient Active Problem List:     Hyperlipidemia     DJD (degenerative joint disease) of cervical spine     Meningioma (HCC)     FH: breast cancer in first degree relative     Fibrocystic breast disease in female     Essential hypertension     Type 2 diabetes mellitus without complication, without long-term current use of insulin (HCC)     Benign neoplasm of cerebral meninges (HCC)     FH: breast cancer in relative when <39years old     Rheumatoid arthritis involving multiple sites with positive rheumatoid factor (HCC)     Genital herpes simplex

## 2019-04-29 ENCOUNTER — OFFICE VISIT (OUTPATIENT)
Dept: PRIMARY CARE CLINIC | Age: 69
End: 2019-04-29
Payer: MEDICARE

## 2019-04-29 VITALS
WEIGHT: 142 LBS | SYSTOLIC BLOOD PRESSURE: 111 MMHG | DIASTOLIC BLOOD PRESSURE: 73 MMHG | TEMPERATURE: 98.1 F | BODY MASS INDEX: 25.15 KG/M2 | HEART RATE: 68 BPM | OXYGEN SATURATION: 99 %

## 2019-04-29 DIAGNOSIS — E11.9 TYPE 2 DIABETES MELLITUS WITHOUT COMPLICATION, WITHOUT LONG-TERM CURRENT USE OF INSULIN (HCC): ICD-10-CM

## 2019-04-29 DIAGNOSIS — Z12.11 SCREENING FOR COLON CANCER: ICD-10-CM

## 2019-04-29 DIAGNOSIS — M54.2 ACUTE NECK PAIN: ICD-10-CM

## 2019-04-29 DIAGNOSIS — I10 ESSENTIAL HYPERTENSION: Primary | ICD-10-CM

## 2019-04-29 DIAGNOSIS — E66.3 OVERWEIGHT (BMI 25.0-29.9): ICD-10-CM

## 2019-04-29 PROCEDURE — 99214 OFFICE O/P EST MOD 30 MIN: CPT | Performed by: PHYSICIAN ASSISTANT

## 2019-04-29 RX ORDER — POTASSIUM CHLORIDE 750 MG/1
TABLET, FILM COATED, EXTENDED RELEASE ORAL
COMMUNITY
Start: 2016-08-25

## 2019-04-29 RX ORDER — ATORVASTATIN CALCIUM 10 MG/1
TABLET, FILM COATED ORAL
Qty: 90 TABLET | Refills: 1 | Status: SHIPPED | OUTPATIENT
Start: 2019-04-29 | End: 2019-08-29 | Stop reason: SDUPTHER

## 2019-04-29 RX ORDER — LISINOPRIL 20 MG/1
TABLET ORAL
Qty: 90 TABLET | Refills: 1 | Status: SHIPPED | OUTPATIENT
Start: 2019-04-29 | End: 2019-08-29 | Stop reason: SDUPTHER

## 2019-04-29 RX ORDER — AMLODIPINE BESYLATE 5 MG/1
5 TABLET ORAL DAILY
Qty: 90 TABLET | Refills: 1 | Status: SHIPPED | OUTPATIENT
Start: 2019-04-29 | End: 2019-07-23 | Stop reason: ALTCHOICE

## 2019-04-29 NOTE — PROGRESS NOTES
Visit Information    Have you changed or started any medications since your last visit including any over-the-counter medicines, vitamins, or herbal medicines? no   Are you having any side effects from any of your medications? -  no  Have you stopped taking any of your medications? Is so, why? -  no    Have you seen any other physician or provider since your last visit? Yes  Have you had any other diagnostic tests since your last visit? Yes - Records Requested  Have you been seen in the emergency room and/or had an admission to a hospital since we last saw you? No  Have you had your routine dental cleaning in the past 6 months? no    Have you activated your [employer] account? If not, what are your barriers?  Yes     Patient Care Team:  Deena Rosas PA-C as PCP - General  Deena Rosas PA-C as PCP - S Attributed Provider    Medical History Review  Past Medical, Family, and Social History reviewed and does not contribute to the patient presenting condition    Health Maintenance   Topic Date Due    DTaP/Tdap/Td vaccine (1 - Tdap) 03/02/1970    Shingles Vaccine (1 of 2) 03/02/2001    Pneumococcal 65+ years Vaccine (1 of 2 - PCV13) 03/02/2016    Lipid screen  01/09/2019    Diabetic foot exam  01/29/2019    A1C test (Diabetic or Prediabetic)  01/29/2019    Diabetic microalbuminuria test  01/31/2019    Flu vaccine (Season Ended) 09/01/2019    Diabetic retinal exam  11/03/2019    Potassium monitoring  02/25/2020    Creatinine monitoring  02/25/2020    Breast cancer screen  03/18/2020    Colon cancer screen colonoscopy  06/09/2024    DEXA (modify frequency per FRAX score)  Addressed    Hepatitis C screen  Addressed

## 2019-04-29 NOTE — PROGRESS NOTES
[facility]  [address]  [address] [facility] [address]  Dept: [phone]  Dept Fax: [phone]    Office Progress/Follow Up Note  Date of patient's visit: 4/29/2019  Patient's Name:  Amalia Rodríguez YOB: 1950            Patient Care Team:  Zahida Ross PA-C as PCP - General  Zahida Ross PA-C as PCP - S Attributed Provider  ================================================================    REASON FOR VISIT/CHIEF COMPLAINT:  Follow-up    HISTORY OF PRESENTING ILLNESS:  History was obtained from: patient. Amalia Rodríguez is a 77 y.o. is here for follow-up for neck pain, diabetes, HTN. Patient states she is compliant with all medications. Patient states neck pain has resolved. Patient states she is only taking 500 mg of metformin daily, recent blood sugars has been in the 90s-110s. Discussed diabetes, medications and depth with patient, informed patient labs will be order to assess A1c. Patient blood pressure stable in office. Patient weight is stable in office. Patient is requesting medication refills. Labs reviewed, instructed patient to get labs done before follow-up appointment. Health maintenance reviewed, discussed colon cancer screening in depth with patient, patient refer to GI for screening colonoscopy. Medications reviewed, refilled amlodipine 5 mg, lisinopril 20 mg, Lipitor 10 mg. Informed patient there will be no changes to medication for diabetes, high blood pressure, continue current treatments.     HPI    Patient Active Problem List   Diagnosis    Hyperlipidemia    DJD (degenerative joint disease) of cervical spine    Meningioma ([address]    FH: breast cancer in first degree relative    Fibrocystic breast disease in female    Essential hypertension    Type 2 diabetes mellitus without complication, without long-term current use of insulin ([address]    Benign neoplasm of cerebral meninges ([address]    FH: breast cancer in relative when <39years old    Rheumatoid arthritis involving multiple sites with positive rheumatoid factor ([facility] [address]    Genital herpes simplex       Health Maintenance Due   Topic Date Due    DTaP/Tdap/Td vaccine (1 - Tdap) 03/02/1970    Shingles Vaccine (1 of 2) 03/02/2001    Lipid screen  01/09/2019    A1C test (Diabetic or Prediabetic)  01/29/2019    Diabetic microalbuminuria test  01/31/2019       Allergies   Allergen Reactions    Amoxicillin-Pot Clavulanate     Benadryl [Diphenhydramine Hcl] Rash         Current Outpatient Medications   Medication Sig Dispense Refill    OMEGA-3 FATTY ACIDS-VITAMIN E PO Take 1,000 mg by mouth      potassium chloride (KLOR-CON) 10 MEQ extended release tablet Take by mouth      amLODIPine (NORVASC) 5 MG tablet Take 1 tablet by mouth daily 90 tablet 1    lisinopril (PRINIVIL;ZESTRIL) 20 MG tablet TAKE 1 TABLET DAILY 90 tablet 1    atorvastatin (LIPITOR) 10 MG tablet TAKE 1 TABLET DAILY 90 tablet 1    metFORMIN (GLUCOPHAGE) 500 MG tablet Take 1 tablet by mouth 2 times daily (with meals) 180 tablet 0    acyclovir (ZOVIRAX) 400 MG tablet Take 400 mg by mouth      methotrexate (RHEUMATREX) 2.5 MG chemo tablet       spironolactone-hydrochlorothiazide (ALDACTAZIDE) 25-25 MG per tablet       predniSONE (DELTASONE) 5 MG tablet Take 5 mg by mouth daily.  hydrocortisone 2.5 % cream Apply topically 2 times daily. 28 g 2     No current facility-administered medications for this visit. Social History     Tobacco Use    Smoking status: Never Smoker    Smokeless tobacco: Never Used   Substance Use Topics    Alcohol use: No    Drug use: No       Family History   Problem Relation Age of Onset    Diabetes Mother     Arthritis Father     Cancer Father     Cancer Sister     Cancer Brother     Lupus Other         REVIEW OFSYSTEMS:  Review of Systems   Constitutional: Negative for chills and fever. HENT: Negative for congestion.     Respiratory: Negative for cough, shortness of breath and wheezing. Cardiovascular: Negative for chest pain. Gastrointestinal: Negative for constipation, diarrhea, nausea and vomiting. Genitourinary: Negative for dysuria, frequency and urgency. Musculoskeletal: Negative for back pain, myalgias and neck pain. Neurological: Negative for headaches. PHYSICAL EXAM:  Vitals:    04/29/19 0830   BP: 111/73   Site: Left Upper Arm   Position: Sitting   Cuff Size: Medium Adult   Pulse: 68   Temp: 98.1 °F (36.7 °C)   TempSrc: Oral   SpO2: 99%   Weight: 142 lb (64.4 kg)     BP Readings from Last 3 Encounters:   04/29/19 111/73   02/26/19 111/75   11/16/18 116/75        Physical Exam   Constitutional: She is oriented to person, place, and time. Vital signs are normal. She appears well-developed and well-nourished. She is cooperative. HENT:   Head: Normocephalic and atraumatic. Right Ear: External ear normal.   Left Ear: External ear normal.   Nose: Nose normal.   Eyes: Pupils are equal, round, and reactive to light. Conjunctivae and lids are normal.   Cardiovascular: Normal rate, regular rhythm and normal heart sounds. Pulses:       Dorsalis pedis pulses are 1+ on the right side, and 1+ on the left side. Posterior tibial pulses are 1+ on the right side, and 1+ on the left side. Pulmonary/Chest: Effort normal and breath sounds normal.   Abdominal: Soft. She exhibits no mass. There is no tenderness. Musculoskeletal: She exhibits no tenderness. Feet:   Right Foot:   Protective Sensation: 8 sites tested. 8 sites sensed. Skin Integrity: Negative for ulcer, blister, skin breakdown, erythema, warmth, callus or dry skin. Left Foot:   Protective Sensation: 8 sites tested. 8 sites sensed. Skin Integrity: Negative for ulcer, blister, skin breakdown, erythema, warmth, callus or dry skin. Neurological: She is alert and oriented to person, place, and time. Skin: Skin is warm and dry.    Vitals reviewed. DIAGNOSTIC FINDINGS:  CBC:  Lab Results   Component Value Date    WBC 7.01 12/21/2017    HGB 15.1 06/20/2016     12/21/2017       BMP:    Lab Results   Component Value Date     01/09/2018    K 4.2 01/09/2018     01/09/2018    CO2 28 01/09/2018    BUN 18 01/09/2018    CREATININE 0.90 01/09/2018    GLUCOSE 112 01/09/2018       HEMOGLOBIN A1C:   Lab Results   Component Value Date    LABA1C 6.5 01/29/2018       FASTING LIPID PANEL:  Lab Results   Component Value Date    CHOL 198 01/09/2018    HDL 66 (H) 01/09/2018    TRIG 160 (H) 01/09/2018       ASSESSMENT AND PLAN:  Rupesh Castro was seen today for follow-up. Diagnoses and all orders for this visit:    Essential hypertension  -     amLODIPine (NORVASC) 5 MG tablet; Take 1 tablet by mouth daily  -     lisinopril (PRINIVIL;ZESTRIL) 20 MG tablet; TAKE 1 TABLET DAILY    Type 2 diabetes mellitus without complication, without long-term current use of insulin (HCC)  -     Hemoglobin A1C; Future  -     Microalbumin, Ur; Future  -     Lipid, Fasting; Future  -     HM DIABETES FOOT EXAM  -     atorvastatin (LIPITOR) 10 MG tablet; TAKE 1 TABLET DAILY    Acute neck pain    Overweight (BMI 25.0-29. 9)    Screening for colon cancer  -     External Referral To Gastroenterology      FOLLOW UP AND INSTRUCTIONS:  Return in about 4 months (around 8/29/2019) for AMW, Lab Review. · Discussed use, benefit, and side effects of prescribed medications. Barriers to medication compliance addressed. All patient questions answered. Pt voiced understanding.      · Patient given educational materials - see patient instructions    Electronically signed by Melo Petit PA-C on 4/29/19 at 9:34 AM    This note is created with the assistance of a speech-recognition program. While intendingto generate a document that actually reflects the content of the visit, the document can still have some mistakes which may not have been identified and corrected by editing.

## 2019-05-06 ASSESSMENT — ENCOUNTER SYMPTOMS
VOMITING: 0
DIARRHEA: 0
NAUSEA: 0
CONSTIPATION: 0
BACK PAIN: 0
SHORTNESS OF BREATH: 0
COUGH: 0
WHEEZING: 0

## 2019-07-15 DIAGNOSIS — E11.9 TYPE 2 DIABETES MELLITUS WITHOUT COMPLICATION, WITHOUT LONG-TERM CURRENT USE OF INSULIN (HCC): ICD-10-CM

## 2019-07-15 LAB
AVERAGE GLUCOSE: 146
CHOLESTEROL, FASTING: 197
CREATININE URINE: 310.8 MG/DL
HBA1C MFR BLD: 6.7 %
HDLC SERPL-MCNC: 62 MG/DL (ref 35–70)
LDL CHOLESTEROL CALCULATED: 97 MG/DL (ref 0–160)
MICROALBUMIN/CREAT 24H UR: 1.98 MG/G{CREAT}
TRIGLYCERIDE, FASTING: 188

## 2019-07-23 ENCOUNTER — OFFICE VISIT (OUTPATIENT)
Dept: PRIMARY CARE CLINIC | Age: 69
End: 2019-07-23
Payer: MEDICARE

## 2019-07-23 ENCOUNTER — HOSPITAL ENCOUNTER (OUTPATIENT)
Age: 69
Setting detail: SPECIMEN
Discharge: HOME | End: 2019-07-23
Payer: MEDICARE

## 2019-07-23 VITALS
OXYGEN SATURATION: 98 % | TEMPERATURE: 98.1 F | DIASTOLIC BLOOD PRESSURE: 76 MMHG | SYSTOLIC BLOOD PRESSURE: 124 MMHG | BODY MASS INDEX: 25.15 KG/M2 | HEART RATE: 64 BPM | WEIGHT: 142 LBS

## 2019-07-23 DIAGNOSIS — J02.9 PHARYNGITIS, UNSPECIFIED ETIOLOGY: Primary | ICD-10-CM

## 2019-07-23 DIAGNOSIS — R05.9 COUGH: ICD-10-CM

## 2019-07-23 LAB — S PYO AG THROAT QL: NORMAL

## 2019-07-23 PROCEDURE — 99202 OFFICE O/P NEW SF 15 MIN: CPT | Performed by: NURSE PRACTITIONER

## 2019-07-23 PROCEDURE — 87880 STREP A ASSAY W/OPTIC: CPT | Performed by: NURSE PRACTITIONER

## 2019-07-23 RX ORDER — BENZONATATE 200 MG/1
200 CAPSULE ORAL 3 TIMES DAILY PRN
Qty: 20 CAPSULE | Refills: 0 | Status: SHIPPED | OUTPATIENT
Start: 2019-07-23 | End: 2019-07-30

## 2019-07-23 RX ORDER — AZITHROMYCIN 250 MG/1
250 TABLET, FILM COATED ORAL SEE ADMIN INSTRUCTIONS
Qty: 6 TABLET | Refills: 0 | Status: SHIPPED | OUTPATIENT
Start: 2019-07-23 | End: 2019-07-28

## 2019-07-23 ASSESSMENT — ENCOUNTER SYMPTOMS
SORE THROAT: 1
RHINORRHEA: 0
NAUSEA: 0
SHORTNESS OF BREATH: 0
SINUS PAIN: 0
VOMITING: 0
COUGH: 1
ABDOMINAL PAIN: 0

## 2019-07-23 NOTE — PATIENT INSTRUCTIONS
chances of quitting for good. · Use a vaporizer or humidifier to add moisture to your bedroom. Follow the directions for cleaning the machine. When should you call for help? Call your doctor now or seek immediate medical care if:    · You have new or worse trouble swallowing.     · Your sore throat gets much worse on one side.    Watch closely for changes in your health, and be sure to contact your doctor if you do not get better as expected. Where can you learn more? Go to [email or web address] and sign in to your [email or web address] account. Enter E822 in the [employer] box to learn more about \"Sore Throat: Care Instructions. \"     If you do not have an account, please click on the \"Sign Up Now\" link. Current as of: October 21, 2018  Content Version: 12.0  © 3414-7180 Healthwise, Incorporated. Care instructions adapted under license by [facility] [place] ([facility]). If you have questions about a medical condition or this instruction, always ask your healthcare professional. [place] [address] any warranty or liability for your use of this information.

## 2019-07-23 NOTE — PROGRESS NOTES
Sitting, Cuff Size: Medium Adult)   Pulse 64   Temp 98.1 °F (36.7 °C) (Oral)   Wt 142 lb (64.4 kg)   LMP  (LMP Unknown)   SpO2 98%   Breastfeeding? No   BMI 25.15 kg/m²   Lab Review   No visits with results within 2 Month(s) from this visit. Latest known visit with results is:   Orders Only on 04/03/2018   Component Date Value    Microalbumin Creatinine * 01/31/2018 6.3     Microalb, Ur 01/31/2018 17.2*    Creatinine, Urine 01/31/2018 271.7        Assessment:       Diagnosis Orders   1. Pharyngitis, unspecified etiology  azithromycin (ZITHROMAX) 250 MG tablet    benzonatate (TESSALON) 200 MG capsule    POCT rapid strep A    Strep A culture, throat   2. Cough  azithromycin (ZITHROMAX) 250 MG tablet    benzonatate (TESSALON) 200 MG capsule       Plan:      Return if symptoms worsen or fail to improve. Orders Placed This Encounter   Medications    azithromycin (ZITHROMAX) 250 MG tablet     Sig: Take 1 tablet by mouth See Admin Instructions for 5 days 500mg on day 1 followed by 250mg on days 2 - 5     Dispense:  6 tablet     Refill:  0    benzonatate (TESSALON) 200 MG capsule     Sig: Take 1 capsule by mouth 3 times daily as needed for Cough     Dispense:  20 capsule     Refill:  0     Results for orders placed or performed in visit on 07/23/19   POCT rapid strep A   Result Value Ref Range    Strep A Ag None Detected None Detected     Patient instructed to complete entire antibiotic course. Discussed Tessalon as needed for cough. Will culture throat and follow-up on results  Tylenol/Motrin as needed for fever/discomfort. Change toothbrush in 24 hours. Salt water gargles and throat lozenges if desired. Patient agreeable to treatment plan. Educational materials provided on AVS.  Follow up if symptoms do not improve/worsen. Patient given educational materials - see patient instructions. Discussed use, benefit, and side effects of prescribed medications. All patientquestions answered.   Pt

## 2019-07-25 LAB
CULTURE: NORMAL
CULTURE: NORMAL
Lab: NORMAL
SPECIMEN DESCRIPTION: NORMAL

## 2019-08-29 ENCOUNTER — OFFICE VISIT (OUTPATIENT)
Dept: PRIMARY CARE CLINIC | Age: 69
End: 2019-08-29
Payer: MEDICARE

## 2019-08-29 VITALS
BODY MASS INDEX: 25.69 KG/M2 | TEMPERATURE: 98.1 F | SYSTOLIC BLOOD PRESSURE: 119 MMHG | HEART RATE: 69 BPM | DIASTOLIC BLOOD PRESSURE: 78 MMHG | WEIGHT: 145 LBS | HEIGHT: 63 IN | OXYGEN SATURATION: 98 %

## 2019-08-29 DIAGNOSIS — Z00.00 ROUTINE GENERAL MEDICAL EXAMINATION AT A HEALTH CARE FACILITY: Primary | ICD-10-CM

## 2019-08-29 DIAGNOSIS — E11.9 TYPE 2 DIABETES MELLITUS WITHOUT COMPLICATION, WITHOUT LONG-TERM CURRENT USE OF INSULIN (HCC): ICD-10-CM

## 2019-08-29 DIAGNOSIS — Z76.0 MEDICATION REFILL: ICD-10-CM

## 2019-08-29 DIAGNOSIS — Z23 NEED FOR PROPHYLACTIC VACCINATION AND INOCULATION AGAINST VARICELLA: ICD-10-CM

## 2019-08-29 PROCEDURE — G0439 PPPS, SUBSEQ VISIT: HCPCS | Performed by: PHYSICIAN ASSISTANT

## 2019-08-29 RX ORDER — LISINOPRIL 20 MG/1
20 TABLET ORAL DAILY
Qty: 90 TABLET | Refills: 1 | Status: SHIPPED | OUTPATIENT
Start: 2019-08-29 | End: 2020-03-03 | Stop reason: SDUPTHER

## 2019-08-29 RX ORDER — ATORVASTATIN CALCIUM 10 MG/1
10 TABLET, FILM COATED ORAL DAILY
Qty: 90 TABLET | Refills: 1 | Status: SHIPPED | OUTPATIENT
Start: 2019-08-29 | End: 2020-03-03 | Stop reason: SDUPTHER

## 2019-08-29 ASSESSMENT — PATIENT HEALTH QUESTIONNAIRE - PHQ9
SUM OF ALL RESPONSES TO PHQ QUESTIONS 1-9: 0

## 2019-08-29 ASSESSMENT — LIFESTYLE VARIABLES: HOW OFTEN DO YOU HAVE A DRINK CONTAINING ALCOHOL: 0

## 2019-08-29 NOTE — PATIENT INSTRUCTIONS
tetanus shot as soon as possible if you have a dirty cut or wound and 5 or more years have passed since your last tetanus shot. The doctor will clean the wound and may give you antibiotics. What happens when you have tetanus? In most cases, symptoms of the disease start within 14 days. But it might take longer. Symptoms often start with a headache and stiffness in the jaw. You also may have a stiff neck, back, or shoulders. Some people have seizures, and it may be hard to swallow. As the toxin spreads, it can be deadly. It can cause problems with your blood pressure and heart rate. It can cause severe and painful muscle spasms in your neck, arms, legs, and belly. If you are infected with tetanus, you will need to stay in a hospital. Your doctor will clean any wounds or cuts. Treatment may include a tetanus shot. You may get antibiotics to kill bacteria. And you may get medicines and fluids to control muscle spasms and pain. If you need to be in the intensive care unit (ICU), you may get treatment to help with breathing and other body functions. Tetanus immunoglobulin (TIG)  Some people may need tetanus immunoglobulin ( TIG) for a dirty wound that is at risk for tetanus. TIG is a protein that helps your body fight the tetanus toxin. It is usually needed only if you have not (or don't know if you have) finished the tetanus shot series or if 5 or more years have passed since your last tetanus shot. What should you do if you have a reaction to a tetanus shot? Some people get a fever as a reaction to a tetanus shot. Reactions may also include warmth, swelling, pain, and redness at the site where the shot was given. Home treatment can help you feel better. If you do feel discomfort, ask your doctor if you can take an over-the-counter medicine for pain and fever, such as acetaminophen (Tylenol), ibuprofen (Advil, Motrin), or naproxen (Aleve). Be safe with medicines.  Read and follow all instructions on the then given at 4 months, 6 months, and 15to 13months of age. Adults usually receive only one dose of the vaccine. The first injection should be given no earlier than 10weeks of age. Allow at least 2 months to pass between injections. If your child is older than 6 months, he or she can still receive this vaccine on the following schedule:  · Age 7-11 months: two injections at least 4 weeks apart, followed by a third injection after the child turns 1 year (at least 2 months after the second injection); · Age 12-23 months: two injections at least 2 months apart;  · Age 19 months to 5 years (before the 7th birthday): one injection. The timing of this vaccination is very important for it to be effective. Your child's individual booster schedule may be different from these guidelines. Follow your doctor's instructions or the schedule recommended by the health department of the state you live in. Your doctor may recommend treating fever and pain with an aspirin-free pain reliever such as acetaminophen (Tylenol) or ibuprofen (Motrin, Advil, and others) when the shot is given and for the next 24 hours. Follow the label directions or your doctor's instructions about how much of this medicine to give your child. It is especially important to prevent fever from occurring in a child who has a seizure disorder such as epilepsy. Be sure to keep your child on a regular schedule for other immunizations such as diphtheria, tetanus, pertussis (whooping cough), hepatitis, and varicella (chicken pox). Your doctor or state health department can provide you with a recommended immunization schedule. What happens if I miss a dose? Contact your doctor if your child will miss a booster dose or gets behind schedule. The next dose should be given as soon as possible. There is no need to start over. Be sure your child receives all recommended doses of this vaccine.  If your child does not receive the full series of vaccines, he or she child have recently received drugs or treatments that can weaken the immune system, including:  · an oral, nasal, inhaled, or injectable steroid medicine;  · chemotherapy or radiation;  · medications to treat psoriasis, rheumatoid arthritis, or other autoimmune disorders, such as azathioprine (Imuran), etanercept (Enbrel), leflunomide ([address]), and others; or  · medicines to treat or prevent organ transplant rejection, such as basiliximab (Simulect), cyclosporine (Sandimmune, Neoral, Gengraf), muromonab CD3 (Orthoclone), mycophenolate mofetil (CellCept), sirolimus (Rapamune), or tacrolimus (Prograf). If you are using any of these medications, you may not be able to receive the vaccine, or may need to wait until the other treatments are finished. There may be other drugs that can interact with pneumococcal 13-valent vaccine. Tell your doctor about all medications you use. This includes prescription, over-the-counter, vitamin, and herbal products. Do not start a new medication without telling your doctor. Where can I get more information? Your doctor or pharmacist can provide more information about this vaccine. Additional information is available from your local health department or the Centers for Disease Control and Prevention. Remember, keep this and all other medicines out of the reach of children, never share your medicines with others, and use this medication only for the indication prescribed. Every effort has been made to ensure that the information provided by [address] is accurate, up-to-date, and complete, but no guarantee is made to that effect. Drug information contained herein may be time sensitive. [facility] information has been compiled for use by healthcare practitioners and consumers in the [place] and therefore [facility] does not warrant that uses outside of the [place] are appropriate, unless specifically indicated otherwise.  [facility]'s drug information does not

## 2019-08-29 NOTE — PROGRESS NOTES
Medicare Annual Wellness Visit  Name: Robyn Wood Date: 2019   MRN: [id number] Sex: Female   Age: 77 y.o. Ethnicity: Non-/Non    : 1950 Race: Tammi Peck is here for Otterology for behavioral, psychosocial and functional/safety risks, and cognitive dysfunction are all negative except as indicated below. These results, as well as other patient data from the [address] form, are documented in Flowsheets linked to this Encounter. Allergies   Allergen Reactions    Amoxicillin-Pot Clavulanate     Benadryl [Diphenhydramine Hcl] Rash     Current Outpatient Medications on File Prior to Visit   Medication Sig Dispense Refill    metFORMIN (GLUCOPHAGE) 500 MG tablet Take 1 tablet by mouth 2 times daily (with meals) 180 tablet 1    OMEGA-3 FATTY ACIDS-VITAMIN E PO Take 1,000 mg by mouth      potassium chloride (KLOR-CON) 10 MEQ extended release tablet Take by mouth      acyclovir (ZOVIRAX) 400 MG tablet Take 400 mg by mouth      methotrexate (RHEUMATREX) 2.5 MG chemo tablet       hydrocortisone 2.5 % cream Apply topically 2 times daily. 28 g 2    spironolactone-hydrochlorothiazide (ALDACTAZIDE) 25-25 MG per tablet       predniSONE (DELTASONE) 5 MG tablet Take 5 mg by mouth daily. No current facility-administered medications on file prior to visit.       Past Medical History:   Diagnosis Date    DJD (degenerative joint disease) of cervical spine     Hyperlipidemia     Hypertension     Obesity     Sleep disorder     Type 2 diabetes mellitus without complication, without long-term current use of insulin [facility] 2016       Past Surgical History:   Procedure Laterality Date    HYSTERECTOMY         Family History   Problem Relation Age of Onset    Diabetes Mother     Arthritis Father     Cancer Father     Cancer Sister     Cancer Brother     Lupus Other        CareTeam (Including outside providers/suppliers regularly involved in providing care):   Patient Care Team:  Maida Blackwood PA-C as PCP - General  Maida Blackwood PA-C as PCP - [facility]    Wt Readings from Last 3 Encounters:   08/29/19 145 lb (65.8 kg)   07/23/19 142 lb (64.4 kg)   04/29/19 142 lb (64.4 kg)     Vitals:    08/29/19 0837   BP: 119/78   Site: Left Upper Arm   Position: Sitting   Cuff Size: Medium Adult   Pulse: 69   Temp: 98.1 °F (36.7 °C)   TempSrc: Oral   SpO2: 98%   Weight: 145 lb (65.8 kg)   Height: 5' 3\" (1.6 m)     Body mass index is 25.69 kg/m². Based upon direct observation of the patient, evaluation of cognition reveals recent and remote memory intact. General Appearance: alert and oriented to person, place and time, well-developed and well-nourished, in no acute distress  Skin: warm and dry, no rash or erythema  Head: normocephalic and atraumatic  Eyes: pupils equal, round, and reactive to light, extraocular eye movements intact, conjunctivae normal  ENT: tympanic membrane, external ear and ear canal normal bilaterally, oropharynx clear and moist with normal mucous membranes  Pulmonary/Chest: clear to auscultation bilaterally- no wheezes, rales or rhonchi, normal air movement, no respiratory distress  Cardiovascular: normal rate, normal S1 and S2 and no gallops  Abdomen: soft, non-tender, non-distended, normal bowel sounds, no masses or organomegaly  Extremities: no cyanosis and no clubbing  Musculoskeletal: normal range of motion, no joint swelling, deformity or tenderness  Neurologic: gait and coordination normal and speech normal    Patient's complete Health Risk Assessment and screening values have been reviewed and are found in Flowsheets. The following problems were reviewed today and where indicated follow up appointments were made and/or referrals ordered.       Positive Risk Factor Screenings with Interventions:     Health Habits/Nutrition:  Health Habits/Nutrition  Do you exercise for at least 20 minutes 2-3

## 2019-08-31 ENCOUNTER — TELEPHONE (OUTPATIENT)
Dept: PRIMARY CARE CLINIC | Age: 69
End: 2019-08-31

## 2019-10-09 ENCOUNTER — TELEPHONE (OUTPATIENT)
Dept: PRIMARY CARE CLINIC | Age: 69
End: 2019-10-09

## 2020-03-03 ENCOUNTER — OFFICE VISIT (OUTPATIENT)
Dept: PRIMARY CARE CLINIC | Age: 70
End: 2020-03-03
Payer: MEDICARE

## 2020-03-03 VITALS
BODY MASS INDEX: 27.17 KG/M2 | HEART RATE: 68 BPM | SYSTOLIC BLOOD PRESSURE: 134 MMHG | WEIGHT: 153.4 LBS | DIASTOLIC BLOOD PRESSURE: 84 MMHG | TEMPERATURE: 98.1 F

## 2020-03-03 LAB — HBA1C MFR BLD: 7 %

## 2020-03-03 PROCEDURE — 99214 OFFICE O/P EST MOD 30 MIN: CPT | Performed by: PHYSICIAN ASSISTANT

## 2020-03-03 PROCEDURE — 83036 HEMOGLOBIN GLYCOSYLATED A1C: CPT | Performed by: PHYSICIAN ASSISTANT

## 2020-03-03 PROCEDURE — 3051F HG A1C>EQUAL 7.0%<8.0%: CPT | Performed by: PHYSICIAN ASSISTANT

## 2020-03-03 RX ORDER — LISINOPRIL 20 MG/1
20 TABLET ORAL DAILY
Qty: 90 TABLET | Refills: 1 | Status: SHIPPED | OUTPATIENT
Start: 2020-03-03 | End: 2020-09-10

## 2020-03-03 RX ORDER — ATORVASTATIN CALCIUM 10 MG/1
10 TABLET, FILM COATED ORAL DAILY
Qty: 90 TABLET | Refills: 1 | Status: SHIPPED | OUTPATIENT
Start: 2020-03-03 | End: 2020-09-08

## 2020-03-03 ASSESSMENT — PATIENT HEALTH QUESTIONNAIRE - PHQ9
SUM OF ALL RESPONSES TO PHQ QUESTIONS 1-9: 0
2. FEELING DOWN, DEPRESSED OR HOPELESS: 0
1. LITTLE INTEREST OR PLEASURE IN DOING THINGS: 0
SUM OF ALL RESPONSES TO PHQ QUESTIONS 1-9: 0
SUM OF ALL RESPONSES TO PHQ9 QUESTIONS 1 & 2: 0

## 2020-03-03 NOTE — PROGRESS NOTES
([facility]    FH: breast cancer in first degree relative    Fibrocystic breast disease in female    Essential hypertension    Type 2 diabetes mellitus without complication, without long-term current use of insulin (HCC)    Benign neoplasm of cerebral meninges (HCC)    FH: breast cancer in relative when <39years old    Rheumatoid arthritis involving multiple sites with positive rheumatoid factor ([facility]    Genital herpes simplex       Health Maintenance Due   Topic Date Due    Hepatitis B vaccine (1 of 3 - Risk 3-dose series) 03/02/1970    Diabetic retinal exam  11/03/2019    Potassium monitoring  02/25/2020    Creatinine monitoring  02/25/2020    Breast cancer screen  03/18/2020       Allergies   Allergen Reactions    Amoxicillin-Pot Clavulanate     Benadryl [Diphenhydramine Hcl] Rash         Current Outpatient Medications   Medication Sig Dispense Refill    atorvastatin (LIPITOR) 10 MG tablet Take 1 tablet by mouth daily 90 tablet 1    lisinopril (PRINIVIL;ZESTRIL) 20 MG tablet Take 1 tablet by mouth daily 90 tablet 1    metFORMIN (GLUCOPHAGE) 500 MG tablet TAKE 1 TABLET TWICE A DAY WITH MEALS 180 tablet 1    OMEGA-3 FATTY ACIDS-VITAMIN E PO Take 1,000 mg by mouth      potassium chloride (KLOR-CON) 10 MEQ extended release tablet Take by mouth      acyclovir (ZOVIRAX) 400 MG tablet Take 400 mg by mouth      methotrexate (RHEUMATREX) 2.5 MG chemo tablet       spironolactone-hydrochlorothiazide (ALDACTAZIDE) 25-25 MG per tablet       predniSONE (DELTASONE) 5 MG tablet Take 5 mg by mouth daily.  hydrocortisone 2.5 % cream Apply topically 2 times daily. (Patient not taking: Reported on 3/3/2020) 28 g 2     No current facility-administered medications for this visit.         Social History     Tobacco Use    Smoking status: Never Smoker    Smokeless tobacco: Never Used   Substance Use Topics    Alcohol use: No    Drug use: No       Family History   Problem Relation Age of Onset    Diabetes FINDINGS:  CBC:  Lab Results   Component Value Date    WBC 7.01 12/21/2017    HGB 15.1 06/20/2016     12/21/2017       BMP:    Lab Results   Component Value Date     01/09/2018    K 4.2 01/09/2018     01/09/2018    CO2 28 01/09/2018    BUN 18 01/09/2018    CREATININE 0.90 01/09/2018    GLUCOSE 112 01/09/2018       HEMOGLOBIN A1C:   Lab Results   Component Value Date    LABA1C 7.0 03/03/2020       FASTING LIPID PANEL:  Lab Results   Component Value Date    CHOL 198 01/09/2018    HDL 62 07/15/2019    TRIG 160 (H) 01/09/2018       ASSESSMENT AND PLAN:  Kenn Warren was seen today for diabetes and hypertension. Diagnoses and all orders for this visit:    Type 2 diabetes mellitus without complication, without long-term current use of insulin (HCC)  -     POCT glycosylated hemoglobin (Hb A1C)  -     atorvastatin (LIPITOR) 10 MG tablet; Take 1 tablet by mouth daily    Essential hypertension  -     lisinopril (PRINIVIL;ZESTRIL) 20 MG tablet; Take 1 tablet by mouth daily  -     CBC Auto Differential; Future    Overweight (BMI 25.0-29. 9)    Rheumatoid arthritis involving multiple sites with positive rheumatoid factor ([facility]      FOLLOW UP AND INSTRUCTIONS:  Return in about 5 months (around 8/3/2020) for DM, HTN. · Discussed use, benefit, and side effects of prescribed medications. Barriers to medication compliance addressed. All patient questions answered. Pt voiced understanding. · Patient given educational materials - see patient instructions    Electronically signed by Emiliano Tovar PA-C on 3/3/20 at 9:39 AM    This note is created with the assistance of a speech-recognition program. While intendingto generate a document that actually reflects the content of the visit, the document can still have some mistakes which may not have been identified and corrected by editing.

## 2020-03-16 ENCOUNTER — TELEPHONE (OUTPATIENT)
Dept: PRIMARY CARE CLINIC | Age: 70
End: 2020-03-16

## 2020-03-16 ASSESSMENT — ENCOUNTER SYMPTOMS
NAUSEA: 0
CONSTIPATION: 0
BACK PAIN: 0
COUGH: 0
DIARRHEA: 0
WHEEZING: 0
SHORTNESS OF BREATH: 0
VOMITING: 0

## 2020-05-27 DIAGNOSIS — Z12.39 BREAST SCREENING: ICD-10-CM

## 2020-10-12 ENCOUNTER — OFFICE VISIT (OUTPATIENT)
Dept: PRIMARY CARE CLINIC | Age: 70
End: 2020-10-12
Payer: MEDICARE

## 2020-10-12 VITALS
HEIGHT: 63 IN | OXYGEN SATURATION: 99 % | DIASTOLIC BLOOD PRESSURE: 95 MMHG | WEIGHT: 153 LBS | HEART RATE: 65 BPM | SYSTOLIC BLOOD PRESSURE: 156 MMHG | BODY MASS INDEX: 27.11 KG/M2 | TEMPERATURE: 97.5 F

## 2020-10-12 LAB
ALBUMIN SERPL-MCNC: 3.9 G/DL
ALP BLD-CCNC: 81 U/L
ALT SERPL-CCNC: 22 U/L
ANION GAP SERPL CALCULATED.3IONS-SCNC: NORMAL MMOL/L
AST SERPL-CCNC: 26 U/L
BILIRUB SERPL-MCNC: 0.7 MG/DL (ref 0.1–1.4)
BUN BLDV-MCNC: 16 MG/DL
CALCIUM SERPL-MCNC: 9.6 MG/DL
CHLORIDE BLD-SCNC: 103 MMOL/L
CHOLESTEROL, TOTAL: 180 MG/DL
CHOLESTEROL/HDL RATIO: 2.5
CO2: 32 MMOL/L
CREAT SERPL-MCNC: 0.85 MG/DL
CREATININE, URINE: 3.7
GFR CALCULATED: 80.2
GLUCOSE BLD-MCNC: 124 MG/DL
HBA1C MFR BLD: 7.4 %
HDLC SERPL-MCNC: 71 MG/DL (ref 35–70)
LDL CHOLESTEROL CALCULATED: 79 MG/DL (ref 0–160)
MICROALBUMIN/CREAT 24H UR: 1.25 MG/G{CREAT}
MICROALBUMIN/CREAT UR-RTO: 341.4
NONHDLC SERPL-MCNC: ABNORMAL MG/DL
POTASSIUM SERPL-SCNC: 4.8 MMOL/L
SODIUM BLD-SCNC: 141 MMOL/L
TOTAL PROTEIN: 6.6
TRIGL SERPL-MCNC: 151 MG/DL
VLDLC SERPL CALC-MCNC: 30 MG/DL

## 2020-10-12 PROCEDURE — 3051F HG A1C>EQUAL 7.0%<8.0%: CPT | Performed by: PHYSICIAN ASSISTANT

## 2020-10-12 PROCEDURE — 83036 HEMOGLOBIN GLYCOSYLATED A1C: CPT | Performed by: PHYSICIAN ASSISTANT

## 2020-10-12 PROCEDURE — 99214 OFFICE O/P EST MOD 30 MIN: CPT | Performed by: PHYSICIAN ASSISTANT

## 2020-10-12 RX ORDER — LANCETS 30 GAUGE
EACH MISCELLANEOUS
Qty: 100 EACH | Refills: 5 | Status: SHIPPED | OUTPATIENT
Start: 2020-10-12 | End: 2022-04-14 | Stop reason: ALTCHOICE

## 2020-10-12 RX ORDER — ATORVASTATIN CALCIUM 10 MG/1
10 TABLET, FILM COATED ORAL DAILY
Qty: 90 TABLET | Refills: 0 | Status: SHIPPED | OUTPATIENT
Start: 2020-10-12 | End: 2021-03-12 | Stop reason: SDUPTHER

## 2020-10-12 RX ORDER — GLUCOSAMINE HCL/CHONDROITIN SU 500-400 MG
CAPSULE ORAL
Qty: 100 STRIP | Refills: 5 | Status: SHIPPED | OUTPATIENT
Start: 2020-10-12 | End: 2021-10-25 | Stop reason: SDUPTHER

## 2020-10-12 RX ORDER — LISINOPRIL 20 MG/1
20 TABLET ORAL DAILY
Qty: 90 TABLET | Refills: 0 | Status: SHIPPED | OUTPATIENT
Start: 2020-10-12 | End: 2020-12-09

## 2020-10-12 NOTE — PROGRESS NOTES
[facility]  [id number] [facility] [address]  Dept: [phone]  Dept Fax: [phone]    Office Progress/Follow Up Note    Date of patient's visit: 10/12/2020    Patient's Name:  Carmenza Gutierres YOB: 1950            Patient Care Team:  Chavez Davis PA-C as PCP - General  Chavezmarcie Davis PA-C as PCP - [facility] Empaneled Provider  RADHIKA Vee CNP as Consulting Physician (Rheumatology)  Jose G Tiwari MD as Consulting Physician (Cardiology)  ================================================================    REASON FOR VISIT/CHIEF COMPLAINT:  Medication Refill (Dm supplies, )    HISTORY OF PRESENTING ILLNESS:  History was obtained from: patient. Carmenza Gutierres is a 72 y.o. is here patient is here for follow-up for diabetes, high blood pressure. Patient states she is compliant with medications. Patient A1c in office 7.4, increased from 7. Patient states \"Metformin once daily\", patient is prescribed twice daily. Diabetic foot exam completed in office. Discussed A1c reading, diabetes in depth with patient, encouraged patient to monitor sugar and carb intake to better control blood sugars, due to slight increase A1c PCP inform patient okay taking Metformin once a day but if it does worsen will need to increase back to twice, patient voiced understanding, patient requested diabetic supplies and medication refill. Patient voiced \"sister passed away a month ago\" and she has been depressed. Offered behavioral health evaluation in office, patient declined, informed patient she does change her mind to contact office. Patient blood pressure is elevated in office. Patient states \"medications not taken yet\". Discussed elevated blood pressure and risk in depth with patient, encourage patient take medication when she gets home. Patient weight is stable. Labs reviewed.     Health maintenance reviewed, discussed diabetic eye exam, influenza vaccination, annual Medicare wellness visit in depth with patient, patient declined influenza vaccine in office, encourage patient to call and schedule diabetic eye exam, patient scheduled for annual Medicare wellness visit. Medications reviewed, refilled Lipitor 10 mg, lisinopril 20 mg, Metformin 500 mg. HPI    Patient Active Problem List   Diagnosis    Hyperlipidemia    DJD (degenerative joint disease) of cervical spine    Meningioma ([facility]    FH: breast cancer in first degree relative    Fibrocystic breast disease in female    Essential hypertension    Type 2 diabetes mellitus without complication, without long-term current use of insulin (HCC)    Benign neoplasm of cerebral meninges ([address]    FH: breast cancer in relative when <39years old    Rheumatoid arthritis involving multiple sites with positive rheumatoid factor ([facility]    Genital herpes simplex       Health Maintenance Due   Topic Date Due    Annual Wellness Visit (AWV)  06/20/2019    Potassium monitoring  02/25/2020    Creatinine monitoring  02/25/2020    Diabetic microalbuminuria test  07/15/2020    Lipid screen  07/15/2020       Allergies   Allergen Reactions    Amoxicillin-Pot Clavulanate     Benadryl [Diphenhydramine Hcl] Rash         Current Outpatient Medications   Medication Sig Dispense Refill    metFORMIN (GLUCOPHAGE) 500 MG tablet Take 1 tablet by mouth daily (with breakfast) 90 tablet 0    lisinopril (PRINIVIL;ZESTRIL) 20 MG tablet Take 1 tablet by mouth daily 90 tablet 0    atorvastatin (LIPITOR) 10 MG tablet Take 1 tablet by mouth daily 90 tablet 0    blood glucose monitor strips Provide insurance covered test strips compatible with glucometer, test 1 times a day 100 strip 5    Lancets MISC Please Dispense appropriate insurance approved lancets for patients glucometer.  100 each 5    OMEGA-3 FATTY ACIDS-VITAMIN E PO Take 1,000 mg by mouth      potassium normal.      Left Ear: External ear normal.      Nose: Nose normal.   Eyes:      General: Lids are normal.      Conjunctiva/sclera: Conjunctivae normal.      Pupils: Pupils are equal, round, and reactive to light. Cardiovascular:      Rate and Rhythm: Normal rate and regular rhythm. Pulses:           Dorsalis pedis pulses are 1+ on the right side and 1+ on the left side. Posterior tibial pulses are 1+ on the right side and 1+ on the left side. Heart sounds: Normal heart sounds. Pulmonary:      Effort: Pulmonary effort is normal.      Breath sounds: Normal breath sounds. Abdominal:      Palpations: Abdomen is soft. There is no mass. Tenderness: There is no abdominal tenderness. Musculoskeletal:         General: No tenderness. Feet:      Right foot:      Protective Sensation: 9 sites tested. 9 sites sensed. Skin integrity: Skin integrity normal. No ulcer, blister, skin breakdown, erythema, warmth, callus or dry skin. Toenail Condition: Right toenails are normal.      Left foot:      Protective Sensation: 9 sites tested. 9 sites sensed. Skin integrity: Skin integrity normal. No ulcer, blister, skin breakdown, erythema, warmth, callus or dry skin. Toenail Condition: Left toenails are normal.   Skin:     General: Skin is warm and dry. Neurological:      Mental Status: She is alert and oriented to person, place, and time. Psychiatric:         Behavior: Behavior is cooperative.            DIAGNOSTIC FINDINGS:  CBC:  Lab Results   Component Value Date    WBC 7.01 12/21/2017    HGB 15.1 06/20/2016     12/21/2017       BMP:    Lab Results   Component Value Date     01/09/2018    K 4.2 01/09/2018     01/09/2018    CO2 28 01/09/2018    BUN 18 01/09/2018    CREATININE 0.90 01/09/2018    GLUCOSE 112 01/09/2018       HEMOGLOBIN A1C:   Lab Results   Component Value Date    LABA1C 7.4 10/12/2020       FASTING LIPID PANEL:  Lab Results   Component Value Date CHOL 198 01/09/2018    HDL 62 07/15/2019    TRIG 160 (H) 01/09/2018       ASSESSMENT AND PLAN:  Pastor Edward was seen today for medication refill. Diagnoses and all orders for this visit:    Essential hypertension  -     lisinopril (PRINIVIL;ZESTRIL) 20 MG tablet; Take 1 tablet by mouth daily    Type 2 diabetes mellitus without complication, without long-term current use of insulin (HCC)  -     POCT glycosylated hemoglobin (Hb A1C)  -      DIABETES FOOT EXAM  -     Comprehensive Metabolic Panel; Future  -     Lipid Panel; Future  -     Microalbumin / Creatinine Urine Ratio; Future  -     metFORMIN (GLUCOPHAGE) 500 MG tablet; Take 1 tablet by mouth daily (with breakfast)  -     lisinopril (PRINIVIL;ZESTRIL) 20 MG tablet; Take 1 tablet by mouth daily  -     atorvastatin (LIPITOR) 10 MG tablet; Take 1 tablet by mouth daily  -     blood glucose monitor strips; Provide insurance covered test strips compatible with glucometer, test 1 times a day  -     Lancets MISC; Please Dispense appropriate insurance approved lancets for patients glucometer. Overweight (BMI 25.0-29. 9)      FOLLOW UP AND INSTRUCTIONS:  Return in about 5 months (around 3/12/2021) for AMW. · Discussed use, benefit, and side effects of prescribed medications. Barriers to medication compliance addressed. All patient questions answered. Pt voiced understanding. · Patient given educational materials - see patient instructions    Electronically signed by Sarah Albright PA-C on 10/12/20 at 10:04 AM EDT    This note is created with the assistance of a speech-recognition program. While intending to generate a document that actually reflects the content of the visit, the document can still have some mistakes which may not have been identified and corrected by editing.

## 2020-10-12 NOTE — PROGRESS NOTES
Visit Information    Have you changed or started any medications since your last visit including any over-the-counter medicines, vitamins, or herbal medicines? no   Are you having any side effects from any of your medications? -  no  Have you stopped taking any of your medications? Is so, why? -  no    Have you seen any other physician or provider since your last visit? No  Have you had any other diagnostic tests since your last visit? No  Have you been seen in the emergency room and/or had an admission to a hospital since we last saw you? No  Have you had your routine dental cleaning in the past 6 months? no    Have you activated your [employer] account? If not, what are your barriers?  Yes     Patient Care Team:  Travon Jauregui PA-C as PCP - General  Travon Jauregui PA-C as PCP - [facility] Provider  RADHIKA Matt CNP as Consulting Physician (Rheumatology)  Jordan Martínez MD as Consulting Physician (Cardiology)    Medical History Review  Past Medical, Family, and Social History reviewed and does contribute to the patient presenting condition    Health Maintenance   Topic Date Due    Annual Wellness Visit (AWV)  06/20/2019    Diabetic retinal exam  11/03/2019    Potassium monitoring  02/25/2020    Creatinine monitoring  02/25/2020    Diabetic foot exam  04/29/2020    Diabetic microalbuminuria test  07/15/2020    Lipid screen  07/15/2020    Flu vaccine (1) 09/01/2020    DTaP/Tdap/Td vaccine (1 - Tdap) 03/03/2021 (Originally 3/2/1970)    Shingles Vaccine (1 of 2) 03/03/2021 (Originally 3/2/2001)    Pneumococcal 65+ years Vaccine (1 of 1 - PPSV23) 03/03/2021 (Originally 3/2/2016)    A1C test (Diabetic or Prediabetic)  03/03/2021    Breast cancer screen  05/21/2021    Colon cancer screen colonoscopy  08/27/2029    DEXA (modify frequency per FRAX score)  Addressed    Hepatitis C screen  Addressed    Hepatitis A vaccine  Aged Out    Hib vaccine  Aged Out    Meningococcal (ACWY)

## 2020-10-19 ASSESSMENT — ENCOUNTER SYMPTOMS
COUGH: 0
WHEEZING: 0
VOMITING: 0
SHORTNESS OF BREATH: 0
DIARRHEA: 0
CONSTIPATION: 0
BACK PAIN: 0
NAUSEA: 0

## 2020-10-29 LAB — DIABETIC RETINOPATHY: NEGATIVE

## 2021-01-19 DIAGNOSIS — Z76.0 MEDICATION REFILL: Primary | ICD-10-CM

## 2021-01-19 NOTE — TELEPHONE ENCOUNTER
Pt called in requesting Rx for Folic Acid. Informed pt medication was not on her list and has never been prescribed to her by this office. Asked pt who prescribed this medication. Pt states she was trying to buy otc. Please advise.

## 2021-01-21 RX ORDER — FOLIC ACID 1 MG/1
1 TABLET ORAL DAILY
Qty: 90 TABLET | Refills: 1 | Status: SHIPPED | OUTPATIENT
Start: 2021-01-21

## 2021-03-12 ENCOUNTER — OFFICE VISIT (OUTPATIENT)
Dept: PRIMARY CARE CLINIC | Age: 71
End: 2021-03-12
Payer: MEDICARE

## 2021-03-12 VITALS
HEIGHT: 63 IN | DIASTOLIC BLOOD PRESSURE: 88 MMHG | WEIGHT: 151.2 LBS | BODY MASS INDEX: 26.79 KG/M2 | TEMPERATURE: 97.3 F | OXYGEN SATURATION: 99 % | SYSTOLIC BLOOD PRESSURE: 144 MMHG | HEART RATE: 69 BPM

## 2021-03-12 DIAGNOSIS — E66.3 OVERWEIGHT (BMI 25.0-29.9): ICD-10-CM

## 2021-03-12 DIAGNOSIS — I10 ESSENTIAL HYPERTENSION: ICD-10-CM

## 2021-03-12 DIAGNOSIS — Z76.0 MEDICATION REFILL: ICD-10-CM

## 2021-03-12 DIAGNOSIS — E11.9 TYPE 2 DIABETES MELLITUS WITHOUT COMPLICATION, WITHOUT LONG-TERM CURRENT USE OF INSULIN (HCC): ICD-10-CM

## 2021-03-12 DIAGNOSIS — Z12.31 ENCOUNTER FOR SCREENING MAMMOGRAM FOR MALIGNANT NEOPLASM OF BREAST: ICD-10-CM

## 2021-03-12 DIAGNOSIS — Z00.00 ROUTINE GENERAL MEDICAL EXAMINATION AT A HEALTH CARE FACILITY: Primary | ICD-10-CM

## 2021-03-12 LAB — HBA1C MFR BLD: 7 %

## 2021-03-12 PROCEDURE — 83036 HEMOGLOBIN GLYCOSYLATED A1C: CPT | Performed by: PHYSICIAN ASSISTANT

## 2021-03-12 PROCEDURE — G0439 PPPS, SUBSEQ VISIT: HCPCS | Performed by: PHYSICIAN ASSISTANT

## 2021-03-12 PROCEDURE — 99212 OFFICE O/P EST SF 10 MIN: CPT | Performed by: PHYSICIAN ASSISTANT

## 2021-03-12 PROCEDURE — 3051F HG A1C>EQUAL 7.0%<8.0%: CPT | Performed by: PHYSICIAN ASSISTANT

## 2021-03-12 RX ORDER — ATORVASTATIN CALCIUM 10 MG/1
10 TABLET, FILM COATED ORAL DAILY
Qty: 90 TABLET | Refills: 1 | Status: SHIPPED | OUTPATIENT
Start: 2021-03-12 | End: 2021-09-15 | Stop reason: SDUPTHER

## 2021-03-12 RX ORDER — LISINOPRIL 20 MG/1
20 TABLET ORAL DAILY
Qty: 90 TABLET | Refills: 0 | Status: SHIPPED | OUTPATIENT
Start: 2021-03-12 | End: 2021-08-13 | Stop reason: SDUPTHER

## 2021-03-12 SDOH — ECONOMIC STABILITY: TRANSPORTATION INSECURITY
IN THE PAST 12 MONTHS, HAS LACK OF TRANSPORTATION KEPT YOU FROM MEETINGS, WORK, OR FROM GETTING THINGS NEEDED FOR DAILY LIVING?: NO

## 2021-03-12 SDOH — ECONOMIC STABILITY: TRANSPORTATION INSECURITY
IN THE PAST 12 MONTHS, HAS THE LACK OF TRANSPORTATION KEPT YOU FROM MEDICAL APPOINTMENTS OR FROM GETTING MEDICATIONS?: NO

## 2021-03-12 ASSESSMENT — PATIENT HEALTH QUESTIONNAIRE - PHQ9
SUM OF ALL RESPONSES TO PHQ QUESTIONS 1-9: 0
SUM OF ALL RESPONSES TO PHQ9 QUESTIONS 1 & 2: 0

## 2021-03-12 NOTE — PATIENT INSTRUCTIONS
· Call and schedule mammogram after 5/21/2021  Personalized Preventive Plan for Sulema Lewis - 3/12/2021  Medicare offers a range of preventive health benefits. Some of the tests and screenings are paid in full while other may be subject to a deductible, co-insurance, and/or copay. Some of these benefits include a comprehensive review of your medical history including lifestyle, illnesses that may run in your family, and various assessments and screenings as appropriate. After reviewing your medical record and screening and assessments performed today your provider may have ordered immunizations, labs, imaging, and/or referrals for you. A list of these orders (if applicable) as well as your Preventive Care list are included within your After Visit Summary for your review. Other Preventive Recommendations:    A preventive eye exam performed by an eye specialist is recommended every 1-2 years to screen for glaucoma; cataracts, macular degeneration, and other eye disorders. A preventive dental visit is recommended every 6 months. Try to get at least 150 minutes of exercise per week or 10,000 steps per day on a pedometer . Order or download the FREE \"Exercise & Physical Activity: Your Everyday Guide\" from [employer]. Call 7-939.665.3217 or search [employer] online. You need 7649-8932 mg of calcium and 4922-4220 IU of vitamin D per day. It is possible to meet your calcium requirement with diet alone, but a vitamin D supplement is usually necessary to meet this goal.  When exposed to the sun, use a sunscreen that protects against both UVA and UVB radiation with an SPF of 30 or greater. Reapply every 2 to 3 hours or after sweating, drying off with a towel, or swimming. Always wear a seat belt when traveling in a car. Always wear a helmet when riding a bicycle or motorcycle.

## 2021-03-12 NOTE — PROGRESS NOTES
Medicare Annual Wellness Visit  Name: Gini Morales Date: 3/21/2021   MRN: [id number] Sex: Female   Age: 80 y.o. Ethnicity: Non-/Non    : 1950 Race: Ivelisse Leslie is here for [employer] for behavioral, psychosocial and functional/safety risks, and cognitive dysfunction are all negative except as indicated below. These results, as well as other patient data from the [address] [facility] form, are documented in Flowsheets linked to this Encounter. Allergies   Allergen Reactions    Amoxicillin-Pot Clavulanate     Benadryl [Diphenhydramine Hcl] Rash         Prior to Visit Medications    Medication Sig Taking? Authorizing Provider   MAGNESIUM PO Take 800 mg by mouth Yes Historical Provider, MD   metFORMIN (GLUCOPHAGE) 500 MG tablet Take 1 tablet by mouth 2 times daily (with meals) Yes Joana Lee PA-C   lisinopril (PRINIVIL;ZESTRIL) 20 MG tablet Take 1 tablet by mouth daily Yes Joana Lee PA-C   atorvastatin (LIPITOR) 10 MG tablet Take 1 tablet by mouth daily Yes Joana Lee PA-C   folic acid (FOLVITE) 1 MG tablet Take 1 tablet by mouth daily Yes Joana Lee PA-C   blood glucose monitor strips Provide insurance covered test strips compatible with glucometer, test 1 times a day Yes Joana Lee PA-C   Lancets [facility] Please Dispense appropriate insurance approved lancets for patients glucometer. Yes Joana Lee PA-C   OMEGA-3 FATTY ACIDS-VITAMIN E PO Take 1,000 mg by mouth Yes Historical Provider, MD   potassium chloride (KLOR-CON) 10 MEQ extended release tablet Take by mouth Yes Historical Provider, MD   acyclovir (ZOVIRAX) 400 MG tablet Take 400 mg by mouth Yes Historical Provider, MD   methotrexate (RHEUMATREX) 2.5 MG chemo tablet  Yes Historical Provider, MD   spironolactone-hydrochlorothiazide (ALDACTAZIDE) 25-25 MG per tablet Only take Mon. Wens.  Friday Yes Historical Provider, MD   predniSONE (DELTASONE) 5 MG tablet Take 5 mg by mouth daily. Yes Historical Provider, MD         Past Medical History:   Diagnosis Date    DJD (degenerative joint disease) of cervical spine     Hyperlipidemia     Hypertension     Obesity     Sleep disorder     Type 2 diabetes mellitus without complication, without long-term current use of insulin [facility] 9/29/2016       Past Surgical History:   Procedure Laterality Date    HYSTERECTOMY           Family History   Problem Relation Age of Onset    Diabetes Mother     Arthritis Father     Cancer Father     Cancer Sister     Cancer Brother     Lupus Other        CareTeam (Including outside providers/suppliers regularly involved in providing care):   Patient Care Team:  Padmini Forbes PA-C as PCP - General  Padmini Forbes PA-C as PCP - [facility] Empaneled Provider  RADHIKA Rasheed - CNP as Consulting Physician (Rheumatology)  Myrna Martinez MD as Consulting Physician (Cardiology)    Wt Readings from Last 3 Encounters:   03/12/21 151 lb 3.2 oz (68.6 kg)   10/12/20 153 lb (69.4 kg)   03/03/20 153 lb 6.4 oz (69.6 kg)     Vitals:    03/12/21 0948 03/12/21 1102   BP: (!) 141/85 (!) 144/88   Site: Left Upper Arm    Position: Sitting    Cuff Size: Medium Adult    Pulse: 70 69   Temp: 97.3 °F (36.3 °C)    TempSrc: Temporal    SpO2: 99%    Weight: 151 lb 3.2 oz (68.6 kg)    Height: 5' 3\" (1.6 m)      Body mass index is 26.78 kg/m². Based upon direct observation of the patient, evaluation of cognition reveals remote memory intact, recent memory impaired.     General Appearance: alert and oriented to person, place and time, well-developed and well-nourished, in no acute distress  Skin: warm and dry, no rash or erythema  Head: normocephalic and atraumatic  Eyes: extraocular eye movements intact and conjunctivae normal  Pulmonary/Chest: clear to auscultation bilaterally- no wheezes, rales or rhonchi, normal air movement, no respiratory distress  Cardiovascular: normal rate, normal S1 and S2 and no gallops  Abdomen: soft, non-tender and non-distended  Extremities: no cyanosis and no clubbing  Musculoskeletal: normal range of motion, no joint swelling, deformity or tenderness  Neurologic: gait and coordination normal and speech normal    Patient's complete Health Risk Assessment and screening values have been reviewed and are found in Flowsheets. The following problems were reviewed today and where indicated follow up appointments were made and/or referrals ordered. Positive Risk Factor Screenings with Interventions:            General Health and ACP:  General  In general, how would you say your health is?: Good  In the past 7 days, have you experienced any of the following?  New or Increased Pain, New or Increased Fatigue, Loneliness, Social Isolation, Stress or Anger?: None of These  Do you get the social and emotional support that you need?: Yes  Do you have a Living Will?: Yes  Advance Directives     Power of [address] [place] [address] Will ACP-Advance Directive ACP-Power of     Not on File Not on File Not on File Not on File      General Health Risk Interventions:  · No concerns     Hearing/Vision:  No exam data present  Hearing/Vision  Do you or your family notice any trouble with your hearing that hasn't been managed with hearing aids?: No  Do you have difficulty driving, watching TV, or doing any of your daily activities because of your eyesight?: (!) Yes  Have you had an eye exam within the past year?: Yes  Hearing/Vision Interventions:  · Vision concerns:  patient declines any further evaluation/treatment for this issue, \"Unable to drive at night due to vision\"      Personalized Preventive Plan   Current Health Maintenance Status  Immunization History   Administered Date(s) Administered    PPD Test 03/05/2012        Health Maintenance   Topic Date Due    COVID-19 Vaccine (1) Never done    DTaP/Tdap/Td vaccine (1 - Tdap) Never done    Shingles Vaccine (1 of 2) Never done    Pneumococcal 65+ years Vaccine (1 of 1 - PPSV23) Never done   [employer] Visit (AWV)  Never done    Flu vaccine (1) 10/12/2021 (Originally 9/1/2020)    Breast cancer screen  05/21/2021    Diabetic foot exam  10/12/2021    Diabetic microalbuminuria test  10/12/2021    Lipid screen  10/12/2021    Potassium monitoring  10/12/2021    Creatinine monitoring  10/12/2021    A1C test (Diabetic or Prediabetic)  03/12/2022    Diabetic retinal exam  10/29/2022    Colon cancer screen colonoscopy  08/27/2029    DEXA (modify frequency per FRAX score)  Addressed    Hepatitis C screen  Addressed    Hepatitis A vaccine  Aged Out    Hib vaccine  Aged Out    Meningococcal (ACWY) vaccine  Aged Out     Recommendations for Seriously Due: see orders and patient instructions/AVS.  . Recommended screening schedule for the next 5-10 years is provided to the patient in written form: see Patient Dearl [place] was seen today for medicare awv. Diagnoses and all orders for this visit:    Routine general medical examination at a health care facility    Essential hypertension  -     lisinopril (PRINIVIL;ZESTRIL) 20 MG tablet; Take 1 tablet by mouth daily  -     [place] OFFICE OUTPATIENT VISIT 10 MINUTES [[id number]]    Type 2 diabetes mellitus without complication, without long-term current use of insulin (HCC)  -     POCT glycosylated hemoglobin (Hb A1C)  -     metFORMIN (GLUCOPHAGE) 500 MG tablet; Take 1 tablet by mouth 2 times daily (with meals)  -     lisinopril (PRINIVIL;ZESTRIL) 20 MG tablet; Take 1 tablet by mouth daily  -     atorvastatin (LIPITOR) 10 MG tablet; Take 1 tablet by mouth daily  -     NM OFFICE OUTPATIENT VISIT 10 MINUTES [[id number]]    Overweight (BMI 25.0-29. 9)    Encounter for screening mammogram for malignant neoplasm of breast  -     JAYA DIGITAL SCREEN W OR WO CAD BILATERAL; Future  -     NM OFFICE OUTPATIENT VISIT 10 MINUTES [[id number]]    Medication refill  -     metFORMIN (GLUCOPHAGE) 500 MG tablet;  Take 1 tablet by mouth 2 times daily (with meals)  -     lisinopril (PRINIVIL;ZESTRIL) 20 MG tablet; Take 1 tablet by mouth daily  -     atorvastatin (LIPITOR) 10 MG tablet; Take 1 tablet by mouth daily  -     MT OFFICE OUTPATIENT VISIT 10 MINUTES [[id number]]                 Cardiovascular Disease Risk Counseling: Assessed the patient's risk to develop cardiovascular disease and reviewed main risk factors. Reviewed steps to reduce disease risk including:   · Quitting tobacco use, reducing amount smoked, or not starting the habit  · Making healthy food choices  · Being physically active and gradualy increasing activity levels   · Reduce weight and determine a healthy BMI goal  · Monitor blood pressure and treat if higher than 140/90 mmHg  · Maintain blood total cholesterol levels under 5 mmol/l or 190 mg/dl  · Maintain LDL cholesterol levels under 3.0 mmol/l or 115 mg/dl   · Control blood glucose levels  · Consider taking aspirin (75 mg daily), once blood pressure is controlled   Provided a follow up plan.   Time spent (minutes): 8 mins

## 2021-03-12 NOTE — PROGRESS NOTES
Visit Information    Have you changed or started any medications since your last visit including any over-the-counter medicines, vitamins, or herbal medicines? no   Are you having any side effects from any of your medications? -  no  Have you stopped taking any of your medications? Is so, why? -  no    Have you seen any other physician or provider since your last visit? Yes - Records Obtained  Have you had any other diagnostic tests since your last visit? No  Have you been seen in the emergency room and/or had an admission to a hospital since we last saw you? No  Have you had your routine dental cleaning in the past 6 months? no    Have you activated your [employer] account? If not, what are your barriers?  Yes     Patient Care Team:  Adilene Seaman PA-C as PCP - [facility]  Adilene Seaman PA-C as PCP - [facility]  RADHIKA Gay - CNP as Consulting Physician (Rheumatology)  Bobby Carter MD as Consulting Physician (Cardiology)    Medical History Review  Past Medical, Family, and Social History reviewed and does not contribute to the patient presenting condition    Health Maintenance   Topic Date Due    COVID-19 Vaccine (1) Never done    DTaP/Tdap/Td vaccine (1 - Tdap) Never done    Shingles Vaccine (1 of 2) Never done    Pneumococcal 65+ years Vaccine (1 of 1 - PPSV23) Never done   [employer] Visit (AWV)  Never done    Flu vaccine (1) 10/12/2021 (Originally 9/1/2020)    Breast cancer screen  05/21/2021    Diabetic retinal exam  10/08/2021    Diabetic foot exam  10/12/2021    A1C test (Diabetic or Prediabetic)  10/12/2021    Diabetic microalbuminuria test  10/12/2021    Lipid screen  10/12/2021    Potassium monitoring  10/12/2021    Creatinine monitoring  10/12/2021    Colon cancer screen colonoscopy  08/27/2029    DEXA (modify frequency per FRAX score)  Addressed    Hepatitis C screen  Addressed    Hepatitis A vaccine  Aged Out    Hib vaccine  Aged C/ Abdiaziz Trish 19 Meningococcal (ACWY) vaccine  Aged Out

## 2021-03-12 NOTE — PROGRESS NOTES
[facility]  [id number] [facility] [address]  Dept: [phone]  Dept Fax: [phone]    Office Progress/Follow Up Note    Date of patient's visit: 3/12/2021    Patient's Name:  Emma Carranza YOB: 1950            Patient Care Team:  Kim Rodas PA-C as PCP - General  Kim Rodas PA-C as PCP - [address] [place] [address] Dr [facility] Provider  RADHIKA Moon CNP as Consulting Physician (Rheumatology)  Namrata Fernández MD as Consulting Physician (Cardiology)  ================================================================    REASON FOR VISIT/CHIEF COMPLAINT:  Medicare AWV    HISTORY OF PRESENTING ILLNESS:  History was obtained from: patient. Emma Carranza is a 80 y.o. is here for annual Medicare wellness visit, follow-up for diabetes, medication refill. Patient states she is compliant with medications. Patient A1c in office 7, decreased from 7.4. Discussed A1c reading, diabetes in depth with patient, encourage patient continue to monitor sugar and carb intake to better control blood sugars, patient voiced understanding, patient requesting diabetic medication refill. Patient blood pressure is elevated in office. Patient is seen by cardiology, \"seen in [place]", will be follow-up with again in July 2021. Patient states \"magnesium increased to 800 mg, spironolactone was decreased to 3 times weekly\". Informed patient will obtain cardiology office note. Patient requesting blood pressure medication refill. Patient weight is stable. Labs reviewed. Health maintenance reviewed, discussed breast cancer screening in depth with patient, mammogram ordered. Medications reviewed and prescribed.     Patient was informed that PCP will be residing in April 2021, encourage patient to get established with another provider in office or contact PCP office to be referred to another primary care patient, patient voiced understanding. HPI    Patient Active Problem List   Diagnosis    Hyperlipidemia    DJD (degenerative joint disease) of cervical spine    Meningioma ([facility]    FH: breast cancer in first degree relative    Fibrocystic breast disease in female    Essential hypertension    Type 2 diabetes mellitus without complication, without long-term current use of insulin (HCC)    Benign neoplasm of cerebral meninges (HCC)    FH: breast cancer in relative when <39years old    Rheumatoid arthritis involving multiple sites with positive rheumatoid factor ([facility]    Genital herpes simplex       Health Maintenance Due   Topic Date Due    COVID-19 Vaccine (1) Never done    DTaP/Tdap/Td vaccine (1 - Tdap) Never done    Shingles Vaccine (1 of 2) Never done    Pneumococcal 65+ years Vaccine (1 of 1 - PPSV23) Never done   [employer] Visit (AWV)  Never done       Allergies   Allergen Reactions    Amoxicillin-Pot Clavulanate     Benadryl [Diphenhydramine Hcl] Rash         Current Outpatient Medications   Medication Sig Dispense Refill    MAGNESIUM PO Take 800 mg by mouth      metFORMIN (GLUCOPHAGE) 500 MG tablet Take 1 tablet by mouth 2 times daily (with meals) 180 tablet 0    lisinopril (PRINIVIL;ZESTRIL) 20 MG tablet Take 1 tablet by mouth daily 90 tablet 0    atorvastatin (LIPITOR) 10 MG tablet Take 1 tablet by mouth daily 90 tablet 1    folic acid (FOLVITE) 1 MG tablet Take 1 tablet by mouth daily 90 tablet 1    blood glucose monitor strips Provide insurance covered test strips compatible with glucometer, test 1 times a day 100 strip 5    Lancets [facility] Please Dispense appropriate insurance approved lancets for patients glucometer.  100 each 5    OMEGA-3 FATTY ACIDS-VITAMIN E PO Take 1,000 mg by mouth      potassium chloride (KLOR-CON) 10 MEQ extended release tablet Take by mouth      acyclovir (ZOVIRAX) 400 MG tablet Take 400 mg by mouth      methotrexate (RHEUMATREX) 2.5 MG chemo tablet       spironolactone-hydrochlorothiazide (ALDACTAZIDE) 25-25 MG per tablet Only take Mon. Wens. Friday      predniSONE (DELTASONE) 5 MG tablet Take 5 mg by mouth daily. No current facility-administered medications for this visit. Social History     Tobacco Use    Smoking status: Never Smoker    Smokeless tobacco: Never Used   Substance Use Topics    Alcohol use: No    Drug use: No       Family History   Problem Relation Age of Onset    Diabetes Mother     Arthritis Father     Cancer Father     Cancer Sister     Cancer Brother     Lupus Other         REVIEW OFSYSTEMS:  Review of Systems   Constitutional: Negative for chills and fever. HENT: Negative for congestion, hearing loss, rhinorrhea and sore throat. Eyes: Negative for pain and visual disturbance. Respiratory: Negative for cough, shortness of breath and wheezing. Cardiovascular: Negative for chest pain. Gastrointestinal: Negative for abdominal pain, constipation, diarrhea, nausea and vomiting. Genitourinary: Negative for difficulty urinating, dysuria, frequency and urgency. Musculoskeletal: Negative for arthralgias, back pain and myalgias. Neurological: Negative for dizziness and headaches.        PHYSICAL EXAM:  Vitals:    03/12/21 0948 03/12/21 1102   BP: (!) 141/85 (!) 144/88   Site: Left Upper Arm    Position: Sitting    Cuff Size: Medium Adult    Pulse: 70 69   Temp: 97.3 °F (36.3 °C)    TempSrc: Temporal    SpO2: 99%    Weight: 151 lb 3.2 oz (68.6 kg)    Height: 5' 3\" (1.6 m)      BP Readings from Last 3 Encounters:   03/12/21 (!) 144/88   10/12/20 (!) 156/95   03/03/20 134/84        General Appearance: alert and oriented to person, place and time, well-developed and well-nourished, in no acute distress  Skin: warm and dry, no rash or erythema  Head: normocephalic and atraumatic  Eyes: extraocular eye movements intact and conjunctivae normal  Pulmonary/Chest: clear to auscultation bilaterally- no wheezes, rales or rhonchi, normal air movement, no respiratory distress  Cardiovascular: normal rate, normal S1 and S2 and no gallops  Abdomen: soft, non-tender and non-distended  Extremities: no cyanosis and no clubbing  Musculoskeletal: normal range of motion, no joint swelling, deformity or tenderness  Neurologic: gait and coordination normal and speech normal        DIAGNOSTIC FINDINGS:  CBC:  Lab Results   Component Value Date    WBC 7.01 12/21/2017    HGB 15.1 06/20/2016     12/21/2017       BMP:    Lab Results   Component Value Date     10/12/2020    K 4.8 10/12/2020     10/12/2020    CO2 32 10/12/2020    BUN 16 10/12/2020    CREATININE 0.85 10/12/2020    GLUCOSE 124 10/12/2020       HEMOGLOBIN A1C:   Lab Results   Component Value Date    LABA1C 7.0 03/12/2021       FASTING LIPID PANEL:  Lab Results   Component Value Date    CHOL 180 10/12/2020    HDL 71 (A) 10/12/2020    TRIG 151 10/12/2020       ASSESSMENT AND PLAN:  iNcole Hubbard was seen today for medicare aw. Diagnoses and all orders for this visit:    Routine general medical examination at a health care facility    Essential hypertension  -     lisinopril (PRINIVIL;ZESTRIL) 20 MG tablet; Take 1 tablet by mouth daily  -     [place] OFFICE OUTPATIENT VISIT 10 MINUTES [[id number]]    Type 2 diabetes mellitus without complication, without long-term current use of insulin (HCC)  -     POCT glycosylated hemoglobin (Hb A1C)  -     metFORMIN (GLUCOPHAGE) 500 MG tablet; Take 1 tablet by mouth 2 times daily (with meals)  -     lisinopril (PRINIVIL;ZESTRIL) 20 MG tablet; Take 1 tablet by mouth daily  -     atorvastatin (LIPITOR) 10 MG tablet; Take 1 tablet by mouth daily  -     [place] OFFICE OUTPATIENT VISIT 10 MINUTES [[id number]]    Overweight (BMI 25.0-29. 9)    Encounter for screening mammogram for malignant neoplasm of breast  -     JAYA DIGITAL SCREEN W OR WO CAD BILATERAL;  Future  -     [place] OFFICE OUTPATIENT VISIT 10 MINUTES [[id number]]    Medication refill  -     metFORMIN

## 2021-03-21 ENCOUNTER — TELEPHONE (OUTPATIENT)
Dept: PRIMARY CARE CLINIC | Age: 71
End: 2021-03-21

## 2021-03-21 ASSESSMENT — ENCOUNTER SYMPTOMS
EYE PAIN: 0
SHORTNESS OF BREATH: 0
RHINORRHEA: 0
WHEEZING: 0
SORE THROAT: 0
DIARRHEA: 0
VOMITING: 0
ABDOMINAL PAIN: 0
COUGH: 0
BACK PAIN: 0
NAUSEA: 0
CONSTIPATION: 0

## 2021-05-13 ENCOUNTER — OFFICE VISIT (OUTPATIENT)
Dept: PRIMARY CARE CLINIC | Age: 71
End: 2021-05-13
Payer: MEDICARE

## 2021-05-13 VITALS
DIASTOLIC BLOOD PRESSURE: 84 MMHG | SYSTOLIC BLOOD PRESSURE: 144 MMHG | HEIGHT: 63 IN | BODY MASS INDEX: 26.58 KG/M2 | OXYGEN SATURATION: 96 % | WEIGHT: 150 LBS | HEART RATE: 68 BPM | TEMPERATURE: 97.5 F

## 2021-05-13 DIAGNOSIS — I10 ELEVATED BLOOD PRESSURE READING WITH DIAGNOSIS OF HYPERTENSION: ICD-10-CM

## 2021-05-13 DIAGNOSIS — Z01.419 WELL WOMAN EXAM: Primary | ICD-10-CM

## 2021-05-13 PROCEDURE — 99213 OFFICE O/P EST LOW 20 MIN: CPT | Performed by: NURSE PRACTITIONER

## 2021-05-13 ASSESSMENT — ENCOUNTER SYMPTOMS
ABDOMINAL PAIN: 0
SWOLLEN GLANDS: 0
COUGH: 0
VOMITING: 0
SORE THROAT: 0

## 2021-05-13 NOTE — PATIENT INSTRUCTIONS
A serving is 1 slice of bread, 1 ounce of dry cereal, or ½ cup of cooked rice, pasta, or cooked cereal. Try to choose whole-grain products as much as possible. · Limit lean meat, poultry, and fish to 2 servings each day. A serving is 3 ounces, about the size of a deck of cards. · Eat 4 to 5 servings of nuts, seeds, and legumes (cooked dried beans, lentils, and split peas) each week. A serving is 1/3 cup of nuts, 2 tablespoons of seeds, or ½ cup of cooked beans or peas. · Limit fats and oils to 2 to 3 servings each day. A serving is 1 teaspoon of vegetable oil or 2 tablespoons of salad dressing. · Limit sweets and added sugars to 5 servings or less a week. A serving is 1 tablespoon jelly or jam, ½ cup sorbet, or 1 cup of lemonade. · Eat less than 2,300 milligrams (mg) of sodium a day. If you limit your sodium to 1,500 mg a day, you can lower your blood pressure even more. · Be aware that all of these are the suggested number of servings for people who eat 1,800 to 2,000 calories a day. Your recommended number of servings may be different if you need more or fewer calories. Tips for success  · Start small. Do not try to make dramatic changes to your diet all at once. You might feel that you are missing out on your favorite foods and then be more likely to not follow the plan. Make small changes, and stick with them. Once those changes become habit, add a few more changes. · Try some of the following:  ? Make it a goal to eat a fruit or vegetable at every meal and at snacks. This will make it easy to get the recommended amount of fruits and vegetables each day. ? Try yogurt topped with fruit and nuts for a snack or healthy dessert. ? Add lettuce, tomato, cucumber, and onion to sandwiches. ? Combine a ready-made pizza crust with low-fat mozzarella cheese and lots of vegetable toppings. Try using tomatoes, squash, spinach, broccoli, carrots, cauliflower, and onions. ?  Have a variety of cut-up vegetables with a low-fat dip as an appetizer instead of chips and dip. ? Sprinkle sunflower seeds or chopped almonds over salads. Or try adding chopped walnuts or almonds to cooked vegetables. ? Try some vegetarian meals using beans and peas. Add garbanzo or kidney beans to salads. Make burritos and tacos with mashed barbour beans or black beans. Where can you learn more? Go to https://Midverse StudiospeCapigami.[employer]. org and sign in to your Wamba account. Enter U729 in the [employer] box to learn more about \"DASH Diet: Care Instructions. \"     If you do not have an account, please click on the \"Sign Up Now\" link. Current as of: August 31, 2020               Content Version: 12.8  © 3377-6961 [employer]. Care instructions adapted under license by [facility] ([facility]). If you have questions about a medical condition or this instruction, always ask your healthcare professional. [place] [address] any warranty or liability for your use of this information. Patient Education        High Blood Pressure: Care Instructions  Overview     It's normal for blood pressure to go up and down throughout the day. But if it stays up, you have high blood pressure. Another name for high blood pressure is hypertension. Despite what a lot of people think, high blood pressure usually doesn't cause headaches or make you feel dizzy or lightheaded. It usually has no symptoms. But it does increase your risk of stroke, heart attack, and other problems. You and your doctor will talk about your risks of these problems based on your blood pressure. Your doctor will give you a goal for your blood pressure. Your goal will be based on your health and your age. Lifestyle changes, such as eating healthy and being active, are always important to help lower blood pressure. You might also take medicine to reach your blood pressure goal.  Follow-up care is a key part of your treatment and safety.  Be sure to make and go to all appointments, and call your doctor if you are having problems. It's also a good idea to know your test results and keep a list of the medicines you take. How can you care for yourself at home? Medical treatment  · If you stop taking your medicine, your blood pressure will go back up. You may take one or more types of medicine to lower your blood pressure. Be safe with medicines. Take your medicine exactly as prescribed. Call your doctor if you think you are having a problem with your medicine. · Talk to your doctor before you start taking aspirin every day. Aspirin can help certain people lower their risk of a heart attack or stroke. But taking aspirin isn't right for everyone, because it can cause serious bleeding. · See your doctor regularly. You may need to see the doctor more often at first or until your blood pressure comes down. · If you are taking blood pressure medicine, talk to your doctor before you take decongestants or anti-inflammatory medicine, such as ibuprofen. Some of these medicines can raise blood pressure. · Learn how to check your blood pressure at home. Lifestyle changes  · Stay at a healthy weight. This is especially important if you put on weight around the waist. Losing even 10 pounds can help you lower your blood pressure. · If your doctor recommends it, get more exercise. Walking is a good choice. Bit by bit, increase the amount you walk every day. Try for at least 30 minutes on most days of the week. You also may want to swim, bike, or do other activities. · Avoid or limit alcohol. Talk to your doctor about whether you can drink any alcohol. · Try to limit how much sodium you eat to less than 2,300 milligrams (mg) a day. Your doctor may ask you to try to eat less than 1,500 mg a day. · Eat plenty of fruits (such as bananas and oranges), vegetables, legumes, whole grains, and low-fat dairy products. · Lower the amount of saturated fat in your diet.  Saturated fat is found in animal products such as milk, cheese, and meat. Limiting these foods may help you lose weight and also lower your risk for heart disease. · Do not smoke. Smoking increases your risk for heart attack and stroke. If you need help quitting, talk to your doctor about stop-smoking programs and medicines. These can increase your chances of quitting for good. When should you call for help? Call  911 anytime you think you may need emergency care. This may mean having symptoms that suggest that your blood pressure is causing a serious heart or blood vessel problem. Your blood pressure may be over 180/120. For example, call 911 if:    · You have symptoms of a heart attack. These may include:  ? Chest pain or pressure, or a strange feeling in the chest.  ? Sweating. ? Shortness of breath. ? Nausea or vomiting. ? Pain, pressure, or a strange feeling in the back, neck, jaw, or upper belly or in one or both shoulders or arms. ? Lightheadedness or sudden weakness. ? A fast or irregular heartbeat.     · You have symptoms of a stroke. These may include:  ? Sudden numbness, tingling, weakness, or loss of movement in your face, arm, or leg, especially on only one side of your body. ? Sudden vision changes. ? Sudden trouble speaking. ? Sudden confusion or trouble understanding simple statements. ? Sudden problems with walking or balance. ? A sudden, severe headache that is different from past headaches.     · You have severe back or belly pain. Do not wait until your blood pressure comes down on its own. Get help right away. Call your doctor now or seek immediate care if:    · Your blood pressure is much higher than normal (such as 180/120 or higher), but you don't have symptoms.     · You think high blood pressure is causing symptoms, such as:  ? Severe headache.  ? Blurry vision.    Watch closely for changes in your health, and be sure to contact your doctor if:    · Your blood pressure measures higher than your salted, and canned meat, fish, and poultry. ? Ham, kang, hot dogs, and luncheon meats. ? Regular, hard, and processed cheese and regular peanut butter. ? Crackers with salted tops, and other salted snack foods such as pretzels, chips, and salted popcorn. ? Frozen prepared meals, unless labeled low-sodium. ? Canned and dried soups, broths, and bouillon, unless labeled sodium-free or low-sodium. ? Canned vegetables, unless labeled sodium-free or low-sodium. ? [place] fries, pizza, tacos, and other fast foods. ? Pickles, olives, ketchup, and other condiments, especially soy sauce, unless labeled sodium-free or low-sodium. Where can you learn more? Go to https://eBooks in MotionpeMoneytree.Bon'App. org and sign in to your Revolv account. Enter D374 in the [facility] box to learn more about \"Low Sodium Diet (2,000 Milligram): Care Instructions. \"     If you do not have an account, please click on the \"Sign Up Now\" link. Current as of: December 17, 2020               Content Version: 12.8  © 2006-2021 [email or web address]. Care instructions adapted under license by [facility] ([facility]). If you have questions about a medical condition or this instruction, always ask your healthcare professional. [place] [address] any warranty or liability for your use of this information. Patient Education        Mammogram: About This Test  What is it? A mammogram is an X-ray of the breast that is used to screen for breast cancer. This test can find tumors that are too small for you or your doctor to feel. Cancer is most easily treated when it is found at an early stage. Why is this test done? A mammogram is done to:  · Look for breast cancer in women who don't have symptoms. · Find breast cancer in women who have symptoms.  Symptoms of breast cancer may include a lump or thickening in the breast, nipple discharge, or dimpling of the skin on one area of the breast.  · Find an area of suspicious breast tissue to remove for an exam under a microscope (biopsy). How do you prepare for the test?  If you've had a mammogram before at another clinic, have the results sent or bring them with you to your appointment. On the day of the mammogram, don't use any deodorant. And don't use perfume, powders, or ointments near or on your breasts. The residue left on your skin by these substances may interfere with the X-rays. How is the test done? · You will need to take off any jewelry that might interfere with the X-ray pictures. · You will need to take off your clothes above the waist.  · You will be given a cloth or paper gown to use during the test.  · You probably will stand during the mammogram.  · One at a time, your breasts will be placed on a flat plate. · Another plate is then pressed firmly against your breast to help flatten out the breast tissue. You may be asked to lift your arm. · For a few seconds while the X-ray picture is being taken, you will need to hold your breath. · At least two pictures are taken of each breast. One is taken from the top and one from the side. How does having a mammogram feel? A mammogram is often uncomfortable but rarely painful. If you have sensitive or fragile skin or a skin condition, let the technician know before you have your exam. If you have menstrual periods, the procedure is more comfortable when done within 2 weeks after your period has ended. Having your breasts flattened is usually uncomfortable, but it helps the technician get the best images. How long does the test take? · The test will take about 10 to 15 minutes. You may be in the clinic for up to an hour. · You may be asked to wait a few minutes while the images are checked to make sure they don't need to be redone. What happens after the test?  · You will probably be able to go home right away. · You can go back to your usual activities right away.   Follow-up care is a key part of your treatment and safety. Be sure to make and go to all appointments, and call your doctor if you are having problems. It's also a good idea to keep a list of the medicines you take. Ask your doctor when you can expect to have your test results. Where can you learn more? Go to https://chpepiceweb.eMotion Technologies. org and sign in to your Vizibility account. Enter V981 in the [employer] box to learn more about \"Mammogram: About This Test.\"     If you do not have an account, please click on the \"Sign Up Now\" link. Current as of: December 17, 2020               Content Version: 12.8  © 6609-7105 Healthwise, Incorporated. Care instructions adapted under license by [facility] ([facility]). If you have questions about a medical condition or this instruction, always ask your healthcare professional. [address] any warranty or liability for your use of this information.

## 2021-05-13 NOTE — PROGRESS NOTES
[facility] [address] [place] [address] IN CARE  [address] [place] [id number]  Dept: [phone]  Dept Fax: [phone]    Ambrose Dale is a 80 y.o. female who presents to the urgent care today for her medical conditions/complaints as notedbelow. Ambrose Dale is c/o of Breast Problem (ongoing for 15 yrs ago, onset for a month now right side of breast , having burning sensation lasting for a couple minutes. ), Other (lump on right upper chest/clavicle, onset last night ), and Hypertension (pt didn't take b/p meds this morning)      HPI:     27-year-old female patient presents for possible bump right upper chest.  She noticed it last night. She has had right breast issues ongoing for at least 15years where the right side of her breast will burn for a few minutes. She has noticed if she puts tissues in her bra that the burning will stop. She reports she has had full work-up with mammograms and ultrasound for this and work-up was negative. She is due for her next mammogram, order is in epic. She denies any new issues with her breasts. She has not taken her lisinopril or water pill yet this morning. She has a history of high blood pressure. She feels well. Other  This is a new problem. The current episode started yesterday. The problem occurs constantly. The problem has been unchanged. Pertinent negatives include no abdominal pain, anorexia, chest pain, chills, congestion, coughing, diaphoresis, fatigue, fever, headaches, myalgias, neck pain, rash, sore throat, swollen glands, vomiting or weakness. Nothing aggravates the symptoms. She has tried nothing for the symptoms. The treatment provided no relief.        Past Medical History:   Diagnosis Date    DJD (degenerative joint disease) of cervical spine     Hyperlipidemia     Hypertension     Obesity     Sleep disorder     Type 2 diabetes mellitus without complication, without long-term current use of insulin ([facility] [address] 9/29/2016        Current Outpatient Medications   Medication Sig Dispense Refill    MAGNESIUM PO Take 800 mg by mouth      metFORMIN (GLUCOPHAGE) 500 MG tablet Take 1 tablet by mouth 2 times daily (with meals) 180 tablet 0    lisinopril (PRINIVIL;ZESTRIL) 20 MG tablet Take 1 tablet by mouth daily 90 tablet 0    atorvastatin (LIPITOR) 10 MG tablet Take 1 tablet by mouth daily 90 tablet 1    folic acid (FOLVITE) 1 MG tablet Take 1 tablet by mouth daily 90 tablet 1    Lancets MISC Please Dispense appropriate insurance approved lancets for patients glucometer. 100 each 5    OMEGA-3 FATTY ACIDS-VITAMIN E PO Take 1,000 mg by mouth      potassium chloride (KLOR-CON) 10 MEQ extended release tablet Take by mouth      methotrexate (RHEUMATREX) 2.5 MG chemo tablet       spironolactone-hydrochlorothiazide (ALDACTAZIDE) 25-25 MG per tablet Only take Mon. Wens. Friday      predniSONE (DELTASONE) 5 MG tablet Take 5 mg by mouth daily.  blood glucose monitor strips Provide insurance covered test strips compatible with glucometer, test 1 times a day 100 strip 5    acyclovir (ZOVIRAX) 400 MG tablet Take 400 mg by mouth       No current facility-administered medications for this visit. Allergies   Allergen Reactions    Amoxicillin-Pot Clavulanate     Benadryl [Diphenhydramine Hcl] Rash       Subjective:      Review of Systems   Constitutional: Negative for chills, diaphoresis, fatigue and fever. HENT: Negative for congestion and sore throat. Respiratory: Negative for cough. Cardiovascular: Negative for chest pain. Gastrointestinal: Negative for abdominal pain, anorexia and vomiting. Musculoskeletal: Negative for myalgias and neck pain. Skin: Negative for rash. Neurological: Negative for weakness and headaches. All other systems reviewed and are negative. 14 systems reviewed and negative except as listed in HPI. Objective:     Physical Exam  Vitals signs and nursing note reviewed.    Constitutional: General: She is not in acute distress. Appearance: Normal appearance. She is not ill-appearing, toxic-appearing or diaphoretic. HENT:      Head: Normocephalic and atraumatic. Right Ear: External ear normal.      Left Ear: External ear normal.      Mouth/Throat:      Mouth: Mucous membranes are moist.      Pharynx: Oropharynx is clear. No oropharyngeal exudate or posterior oropharyngeal erythema. Eyes:      General: No scleral icterus. Right eye: No discharge. Left eye: No discharge. Extraocular Movements: Extraocular movements intact. Conjunctiva/sclera: Conjunctivae normal.      Pupils: Pupils are equal, round, and reactive to light. Neck:      Musculoskeletal: Neck supple. No neck rigidity or muscular tenderness. Comments: No masses lumps or nodes, unremarkable exam  Cardiovascular:      Rate and Rhythm: Normal rate and regular rhythm. Pulses: Normal pulses. Heart sounds: Normal heart sounds. No murmur. No friction rub. No gallop. Pulmonary:      Effort: Pulmonary effort is normal.      Breath sounds: Normal breath sounds. Comments: No tenderness, mass or abnormal findings at clavicular notch or manubrium where pt points to hard bony mass, both sides are equal.   Abdominal:      General: Bowel sounds are normal. There is no distension. Palpations: Abdomen is soft. There is no mass. Tenderness: There is no abdominal tenderness. There is no right CVA tenderness, left CVA tenderness, guarding or rebound. Hernia: No hernia is present. Musculoskeletal:         General: No signs of injury. Comments: Gait steady   Lymphadenopathy:      Cervical: No cervical adenopathy. Skin:     Capillary Refill: Capillary refill takes less than 2 seconds. Findings: No rash ( no rash to visible skin). Neurological:      General: No focal deficit present. Mental Status: She is alert and oriented to person, place, and time.    Psychiatric: Mood and Affect: Mood normal.         Behavior: Behavior normal.       BP (!) 144/84 (Site: Left Upper Arm, Position: Sitting, Cuff Size: Medium Adult)   Pulse 68   Temp 97.5 °F (36.4 °C) (Infrared)   Ht 5' 3\" (1.6 m)   Wt 150 lb (68 kg)   LMP  (LMP Unknown)   SpO2 96%   BMI 26.57 kg/m²     Assessment:       Diagnosis Orders   1. Well woman exam     2. Elevated blood pressure reading with diagnosis of hypertension         Plan:    keep appt for routine amalia in June - order is in [address] [place] [address] Rd. No new breast sx, sx resolve with tissue in bra on rt side - try different bra? Ill fitting? Has had w/u for same in past including mamm snd breast US, all were neg - reviewed results in chart  No masses palpated to upper chest  Return worse  Take bp meds as prescribed  htn causes and tx reviewed. Return if symptoms worsen or fail to improve, for Make an Appt. with your Primary Care in 1 week. No orders of the defined types were placed in this encounter. Patient given educational materials - see patient instructions. Discussed use, benefit, and side effects of prescribed medications. All patient questions answered. Pt voicedunderstanding.     Electronically signed by RADHIKA Bess CNP on 5/13/2021 at 10:04 AM

## 2021-06-14 DIAGNOSIS — Z76.0 MEDICATION REFILL: ICD-10-CM

## 2021-06-14 DIAGNOSIS — E11.9 TYPE 2 DIABETES MELLITUS WITHOUT COMPLICATION, WITHOUT LONG-TERM CURRENT USE OF INSULIN (HCC): ICD-10-CM

## 2021-07-14 ENCOUNTER — OFFICE VISIT (OUTPATIENT)
Dept: PRIMARY CARE CLINIC | Age: 71
End: 2021-07-14
Payer: MEDICARE

## 2021-07-14 VITALS
DIASTOLIC BLOOD PRESSURE: 82 MMHG | SYSTOLIC BLOOD PRESSURE: 131 MMHG | WEIGHT: 150.4 LBS | HEART RATE: 67 BPM | TEMPERATURE: 98.4 F | BODY MASS INDEX: 26.64 KG/M2 | OXYGEN SATURATION: 99 %

## 2021-07-14 DIAGNOSIS — R07.89 RIGHT-SIDED CHEST WALL PAIN: Primary | ICD-10-CM

## 2021-07-14 PROCEDURE — 99213 OFFICE O/P EST LOW 20 MIN: CPT | Performed by: NURSE PRACTITIONER

## 2021-07-14 RX ORDER — HYDROCHLOROTHIAZIDE 12.5 MG/1
TABLET ORAL
COMMUNITY
Start: 2021-07-12

## 2021-07-14 ASSESSMENT — ENCOUNTER SYMPTOMS
CONSTIPATION: 0
COUGH: 0
RHINORRHEA: 0
VOMITING: 0
EYE PAIN: 0
SORE THROAT: 0
WHEEZING: 0
DIARRHEA: 0
BACK PAIN: 0
SHORTNESS OF BREATH: 0
ABDOMINAL PAIN: 0
NAUSEA: 0

## 2021-07-14 NOTE — PROGRESS NOTES
[facility]  [id number] [facility] [address]  Dept: [phone]  Dept Fax: [phone]    Patient Care Team:  RADHIKA Barriga CNP as PCP - General (Family Medicine)  RADHIKA Leon CNP as PCP - [facility] Empaneled Provider  RADHIKA Flaherty CNP as Consulting Physician (Rheumatology)  Macy Gooden MD as Consulting Physician (Cardiology)    2021     Roxanne Cisneros (:  1950)is a 80 y.o. female, here for evaluation of the following medical concerns:   Chief Complaint   Patient presents with   [address]     lumb in neck and underarm pain on right side comes and goes       Roxanne Cisneros is a 43-year-old female here today to establish care, former PCP recently left the practice. She is concerned today because she states she has an ongoing discomfort under her right armpit. Having ongoing pain under right armpit, feels better when pressure applied. Denies any lump or rash or open skin. No known injury. She states discomforts been ongoing for about a year. Had a mammogram last month, came back normal    No unexplained weight loss, no fever or chills    Has also noticed skin in front of her right neck, \"feels larger\"  No palpable lump  No trouble swallowing, not feeling SOB    Just saw cardiology yesterday, stopped aldactone and started HCTZ 12.5 mg.    .    Review of Systems   Constitutional: Negative for chills and fever. HENT: Negative for congestion, hearing loss, rhinorrhea and sore throat. Eyes: Negative for pain and visual disturbance. Respiratory: Negative for cough, shortness of breath and wheezing. Cardiovascular: Negative for chest pain. Gastrointestinal: Negative for abdominal pain, constipation, diarrhea, nausea and vomiting. Genitourinary: Negative for difficulty urinating, dysuria, frequency and urgency.    Musculoskeletal: Negative for arthralgias, back pain and myalgias. Neurological: Negative for dizziness and headaches. Prior to Visit Medications    Medication Sig Taking? Authorizing Provider   hydroCHLOROthiazide (HYDRODIURIL) 12.5 MG tablet  Yes Historical Provider, MD   metFORMIN (GLUCOPHAGE) 500 MG tablet Take 1 tablet by mouth 2 times daily (with meals) Yes RADHIKA Urbina CNP   MAGNESIUM PO Take 800 mg by mouth Yes Historical Provider, MD   lisinopril (PRINIVIL;ZESTRIL) 20 MG tablet Take 1 tablet by mouth daily Yes Deborah Dunbar PA-C   atorvastatin (LIPITOR) 10 MG tablet Take 1 tablet by mouth daily Yes Deborah Dunbar PA-C   folic acid (FOLVITE) 1 MG tablet Take 1 tablet by mouth daily Yes Deborah Dunbar PA-C   blood glucose monitor strips Provide insurance covered test strips compatible with glucometer, test 1 times a day Yes Deborah Dunbar PA-C   Lancets MISC Please Dispense appropriate insurance approved lancets for patients glucometer. Yes Deborah Dunbar PA-C   OMEGA-3 FATTY ACIDS-VITAMIN E PO Take 1,000 mg by mouth Yes Historical Provider, MD   potassium chloride (KLOR-CON) 10 MEQ extended release tablet Take by mouth Yes Historical Provider, MD   acyclovir (ZOVIRAX) 400 MG tablet Take 400 mg by mouth Yes Historical Provider, MD   methotrexate (RHEUMATREX) 2.5 MG chemo tablet  Yes Historical Provider, MD   predniSONE (DELTASONE) 5 MG tablet Take 5 mg by mouth daily. Yes Historical Provider, MD        Social History     Tobacco Use    Smoking status: Never Smoker    Smokeless tobacco: Never Used   Substance Use Topics    Alcohol use: No        Vitals:    07/14/21 1025 07/14/21 1109   BP: (!) 158/77 131/82   Site: Left Upper Arm    Position: Sitting    Pulse: 74 67   Temp: 98.4 °F (36.9 °C)    SpO2: 99%    Weight: 150 lb 6.4 oz (68.2 kg)      Estimated body mass index is 26.64 kg/m² as calculated from the following:    Height as of 5/13/21: 5' 3\" (1.6 m).     Weight as of this encounter: 150 lb 6.4 oz (68.2 kg).    DIAGNOSTIC FINDINGS:  CBC:  Lab Results   Component Value Date    WBC 7.01 12/21/2017    HGB 15.1 06/20/2016     12/21/2017       BMP:    Lab Results   Component Value Date     10/12/2020    K 4.8 10/12/2020     10/12/2020    CO2 32 10/12/2020    BUN 16 10/12/2020    CREATININE 0.85 10/12/2020    GLUCOSE 124 10/12/2020       HEMOGLOBIN A1C:   Lab Results   Component Value Date    LABA1C 7.0 03/12/2021       FASTING LIPID PANEL:  Lab Results   Component Value Date    CHOL 180 10/12/2020    HDL 71 (A) 10/12/2020    TRIG 151 10/12/2020       Physical Exam  Vitals reviewed. Constitutional:       Appearance: Normal appearance. She is well-developed, well-groomed and overweight. HENT:      Head: Normocephalic and atraumatic. Right Ear: External ear normal.      Left Ear: External ear normal.      Nose: Nose normal.   Eyes:      General: Lids are normal.      Conjunctiva/sclera: Conjunctivae normal.      Pupils: Pupils are equal, round, and reactive to light. Cardiovascular:      Rate and Rhythm: Normal rate and regular rhythm. Heart sounds: Normal heart sounds. Pulmonary:      Effort: Pulmonary effort is normal.      Breath sounds: Normal breath sounds. Abdominal:      Palpations: Abdomen is soft. There is no mass. Tenderness: There is no abdominal tenderness. Musculoskeletal:         General: No tenderness. Lymphadenopathy:      Head:      Right side of head: No submental or submandibular adenopathy. Left side of head: No submental or submandibular adenopathy. Cervical: No cervical adenopathy. Upper Body:      Right upper body: No supraclavicular, axillary or pectoral adenopathy. Left upper body: No supraclavicular or pectoral adenopathy. Skin:     General: Skin is warm and dry. Neurological:      Mental Status: She is alert and oriented to person, place, and time. Psychiatric:         Behavior: Behavior is cooperative.

## 2021-07-14 NOTE — PROGRESS NOTES
Visit Information    Have you changed or started any medications since your last visit including any over-the-counter medicines, vitamins, or herbal medicines? no   Are you having any side effects from any of your medications? -  no  Have you stopped taking any of your medications? Is so, why? -  no    Have you seen any other physician or provider since your last visit? No  Have you had any other diagnostic tests since your last visit? No  Have you been seen in the emergency room and/or had an admission to a hospital since we last saw you? No  Have you had your routine dental cleaning in the past 6 months? no    Have you activated your [employer] account? If not, what are your barriers?  Yes     Patient Care Team:  RADHIKA Alanis CNP as PCP - General (Family Medicine)  RADHIKA Negron CNP as PCP - [facility] Provider  RADHIKA Maxwell CNP as Consulting Physician (Rheumatology)  Ce Chandler MD as Consulting Physician (Cardiology)    Medical History Review  Past Medical, Family, and Social History reviewed and does contribute to the patient presenting condition    Health Maintenance   Topic Date Due    COVID-19 Vaccine (1) Never done    DTaP/Tdap/Td vaccine (1 - Tdap) Never done    Shingles Vaccine (1 of 2) Never done    Pneumococcal 65+ years Vaccine (1 of 1 - PPSV23) Never done    Breast cancer screen  05/21/2021    Flu vaccine (1) 09/01/2021    Diabetic foot exam  10/12/2021    Diabetic microalbuminuria test  10/12/2021    Lipid screen  10/12/2021    Potassium monitoring  10/12/2021    Creatinine monitoring  10/12/2021    A1C test (Diabetic or Prediabetic)  03/12/2022    Annual Wellness Visit (AWV)  03/13/2022    Diabetic retinal exam  10/29/2022    Colon cancer screen colonoscopy  08/27/2029    DEXA (modify frequency per FRAX score)  Addressed    Hepatitis C screen  Addressed    Hepatitis A vaccine  Aged Out    Hib vaccine  Aged Out    Meningococcal (ACWY) vaccine Aged Out

## 2021-08-13 DIAGNOSIS — E11.9 TYPE 2 DIABETES MELLITUS WITHOUT COMPLICATION, WITHOUT LONG-TERM CURRENT USE OF INSULIN (HCC): ICD-10-CM

## 2021-08-13 DIAGNOSIS — Z76.0 MEDICATION REFILL: ICD-10-CM

## 2021-08-13 DIAGNOSIS — I10 ESSENTIAL HYPERTENSION: ICD-10-CM

## 2021-08-13 RX ORDER — LISINOPRIL 20 MG/1
20 TABLET ORAL DAILY
Qty: 90 TABLET | Refills: 0 | Status: SHIPPED | OUTPATIENT
Start: 2021-08-13 | End: 2021-10-14

## 2021-08-13 NOTE — TELEPHONE ENCOUNTER
Health Maintenance   Topic Date Due    COVID-19 Vaccine (1) Never done    DTaP/Tdap/Td vaccine (1 - Tdap) Never done    Shingles Vaccine (1 of 2) Never done    Pneumococcal 65+ years Vaccine (1 of 1 - PPSV23) Never done    Flu vaccine (1) 09/01/2021    Diabetic foot exam  10/12/2021    Diabetic microalbuminuria test  10/12/2021    Lipid screen  10/12/2021    Potassium monitoring  10/12/2021    Creatinine monitoring  10/12/2021    A1C test (Diabetic or Prediabetic)  03/12/2022    Annual Wellness Visit (AWV)  03/13/2022    Breast cancer screen  06/03/2022    Diabetic retinal exam  10/29/2022    Colon cancer screen colonoscopy  08/27/2029    DEXA (modify frequency per FRAX score)  Addressed    Hepatitis C screen  Addressed    Hepatitis A vaccine  Aged Out    Hib vaccine  Aged Out    Meningococcal (ACWY) vaccine  Aged Out             (applicable per patient's age: Cancer Screenings, Depression Screening, Fall Risk Screening, Immunizations)    Hemoglobin A1C (%)   Date Value   03/12/2021 7.0   10/12/2020 7.4   03/03/2020 7.0     LDL Calculated (mg/dL)   Date Value   10/12/2020 79     AST (U/L)   Date Value   10/12/2020 26     ALT (U/L)   Date Value   10/12/2020 22     BUN (mg/dL)   Date Value   10/12/2020 16      (goal A1C is < 7)   (goal LDL is <100) need 30-50% reduction from baseline     BP Readings from Last 3 Encounters:   07/14/21 131/82   05/13/21 (!) 144/84   03/12/21 (!) 144/88    (goal /80)      All Future Testing planned in [facility]:  Lab Frequency Next Occurrence   JAYA DIGITAL SCREEN W OR WO CAD BILATERAL Once 05/21/2021       Next Visit Date:  Future Appointments   Date Time Provider Department Center   10/14/2021 10:00 AM RADHIKA Singh CNP V WALK IN [facility]            Patient Active Problem List:     Hyperlipidemia     DJD (degenerative joint disease) of cervical spine     Meningioma (HCC)     FH: breast cancer in first degree relative     Fibrocystic breast disease in female     Essential hypertension     Type 2 diabetes mellitus without complication, without long-term current use of insulin (HCC)     Benign neoplasm of cerebral meninges (HCC)     FH: breast cancer in relative when <39years old     Rheumatoid arthritis involving multiple sites with positive rheumatoid factor (HCC)     Genital herpes simplex

## 2021-09-11 DIAGNOSIS — E11.9 TYPE 2 DIABETES MELLITUS WITHOUT COMPLICATION, WITHOUT LONG-TERM CURRENT USE OF INSULIN (HCC): ICD-10-CM

## 2021-09-11 DIAGNOSIS — Z76.0 MEDICATION REFILL: ICD-10-CM

## 2021-09-11 NOTE — TELEPHONE ENCOUNTER
Health Maintenance   Topic Date Due    COVID-19 Vaccine (1) Never done    DTaP/Tdap/Td vaccine (1 - Tdap) Never done    Shingles Vaccine (1 of 2) Never done    Pneumococcal 65+ years Vaccine (1 of 1 - PPSV23) Never done    Flu vaccine (1) Never done    Diabetic foot exam  10/12/2021    Diabetic microalbuminuria test  10/12/2021    Lipid screen  10/12/2021    Potassium monitoring  10/12/2021    Creatinine monitoring  10/12/2021    A1C test (Diabetic or Prediabetic)  03/12/2022    Annual Wellness Visit (AWV)  03/13/2022    Breast cancer screen  06/03/2022    Diabetic retinal exam  10/29/2022    Colon cancer screen colonoscopy  08/27/2029    DEXA (modify frequency per FRAX score)  Addressed    Hepatitis C screen  Addressed    Hepatitis A vaccine  Aged Out    Hib vaccine  Aged Out    Meningococcal (ACWY) vaccine  Aged Out             (applicable per patient's age: Cancer Screenings, Depression Screening, Fall Risk Screening, Immunizations)    Hemoglobin A1C (%)   Date Value   03/12/2021 7.0   10/12/2020 7.4   03/03/2020 7.0     LDL Calculated (mg/dL)   Date Value   10/12/2020 79     AST (U/L)   Date Value   10/12/2020 26     ALT (U/L)   Date Value   10/12/2020 22     BUN (mg/dL)   Date Value   10/12/2020 16      (goal A1C is < 7)   (goal LDL is <100) need 30-50% reduction from baseline     BP Readings from Last 3 Encounters:   07/14/21 131/82   05/13/21 (!) 144/84   03/12/21 (!) 144/88    (goal /80)      All Future Testing planned in [facility]:  Lab Frequency Next Occurrence   JAYA DIGITAL SCREEN W OR WO CAD BILATERAL Once 05/21/2021       Next Visit Date:  Future Appointments   Date Time Provider Department Center   10/14/2021 10:00 AM RADHIKA Barriga CNP [facility] WALK IN [facility]            Patient Active Problem List:     Hyperlipidemia     DJD (degenerative joint disease) of cervical spine     Meningioma (HCC)     FH: breast cancer in first degree relative     Fibrocystic breast disease in

## 2021-09-14 DIAGNOSIS — E11.9 TYPE 2 DIABETES MELLITUS WITHOUT COMPLICATION, WITHOUT LONG-TERM CURRENT USE OF INSULIN (HCC): ICD-10-CM

## 2021-09-14 DIAGNOSIS — Z76.0 MEDICATION REFILL: ICD-10-CM

## 2021-09-15 RX ORDER — ATORVASTATIN CALCIUM 10 MG/1
10 TABLET, FILM COATED ORAL DAILY
Qty: 90 TABLET | Refills: 1 | Status: SHIPPED | OUTPATIENT
Start: 2021-09-15 | End: 2022-08-15 | Stop reason: SDUPTHER

## 2021-10-14 ENCOUNTER — OFFICE VISIT (OUTPATIENT)
Dept: PRIMARY CARE CLINIC | Age: 71
End: 2021-10-14
Payer: MEDICARE

## 2021-10-14 VITALS
TEMPERATURE: 97.3 F | HEART RATE: 62 BPM | BODY MASS INDEX: 26.57 KG/M2 | DIASTOLIC BLOOD PRESSURE: 89 MMHG | OXYGEN SATURATION: 98 % | WEIGHT: 150 LBS | SYSTOLIC BLOOD PRESSURE: 179 MMHG

## 2021-10-14 DIAGNOSIS — Z13.220 SCREENING FOR HYPERLIPIDEMIA: ICD-10-CM

## 2021-10-14 DIAGNOSIS — F43.9 STRESS AT HOME: ICD-10-CM

## 2021-10-14 DIAGNOSIS — E11.9 TYPE 2 DIABETES MELLITUS WITHOUT COMPLICATION, WITHOUT LONG-TERM CURRENT USE OF INSULIN (HCC): Primary | ICD-10-CM

## 2021-10-14 DIAGNOSIS — I10 ESSENTIAL HYPERTENSION: ICD-10-CM

## 2021-10-14 DIAGNOSIS — Z28.21 INFLUENZA VACCINATION DECLINED: ICD-10-CM

## 2021-10-14 LAB — HBA1C MFR BLD: 6.7 %

## 2021-10-14 PROCEDURE — 83036 HEMOGLOBIN GLYCOSYLATED A1C: CPT | Performed by: NURSE PRACTITIONER

## 2021-10-14 PROCEDURE — 99214 OFFICE O/P EST MOD 30 MIN: CPT | Performed by: NURSE PRACTITIONER

## 2021-10-14 RX ORDER — LISINOPRIL 40 MG/1
TABLET ORAL
COMMUNITY
Start: 2021-09-10 | End: 2022-09-19 | Stop reason: SDUPTHER

## 2021-10-14 RX ORDER — METOPROLOL SUCCINATE 25 MG/1
TABLET, EXTENDED RELEASE ORAL
COMMUNITY
Start: 2021-10-08

## 2021-10-14 ASSESSMENT — ENCOUNTER SYMPTOMS
SORE THROAT: 0
SHORTNESS OF BREATH: 0
BLURRED VISION: 0
VOMITING: 0
ABDOMINAL PAIN: 0
COUGH: 0

## 2021-10-18 ENCOUNTER — CLINICAL DOCUMENTATION (OUTPATIENT)
Dept: BEHAVIORAL/MENTAL HEALTH CLINIC | Age: 71
End: 2021-10-18

## 2021-10-18 NOTE — PROGRESS NOTES
Warm handoff was completed. Patient scheduled for future face to face visit. She reported that she has been experiencing stress at home which is impacting her blood pressure. She denied symptoms of depression or anxiety and denied suicidal ideation, plan, or intent. She reported that she would like to speak with a counselor to learn coping skills to manage her stressors. Informed her of [facility] [facility] model and she expressed understanding. Informed her that someone will contact her to schedule an appointment.

## 2021-10-25 DIAGNOSIS — E11.9 TYPE 2 DIABETES MELLITUS WITHOUT COMPLICATION, WITHOUT LONG-TERM CURRENT USE OF INSULIN (HCC): ICD-10-CM

## 2021-10-25 RX ORDER — GLUCOSAMINE HCL/CHONDROITIN SU 500-400 MG
CAPSULE ORAL
Qty: 100 STRIP | Refills: 5 | Status: SHIPPED | OUTPATIENT
Start: 2021-10-25 | End: 2022-04-14 | Stop reason: SDUPTHER

## 2021-10-25 NOTE — TELEPHONE ENCOUNTER
Health Maintenance   Topic Date Due    COVID-19 Vaccine (1) Never done    DTaP/Tdap/Td vaccine (1 - Tdap) Never done    Shingles Vaccine (1 of 2) Never done    Pneumococcal 65+ years Vaccine (1 of 1 - PPSV23) Never done    Diabetic foot exam  10/12/2021    Diabetic microalbuminuria test  10/12/2021    Potassium monitoring  10/12/2021    Creatinine monitoring  10/12/2021    Lipid screen  10/12/2021    Flu vaccine (1) 06/30/2022 (Originally 9/1/2021)    Annual Wellness Visit (AWV)  03/13/2022    Breast cancer screen  06/03/2022    A1C test (Diabetic or Prediabetic)  10/14/2022    Diabetic retinal exam  10/29/2022    Colon cancer screen colonoscopy  08/27/2029    DEXA (modify frequency per FRAX score)  Addressed    Hepatitis C screen  Addressed    Hepatitis A vaccine  Aged Out    Hib vaccine  Aged Out    Meningococcal (ACWY) vaccine  Aged Out             (applicable per patient's age: Cancer Screenings, Depression Screening, Fall Risk Screening, Immunizations)    Hemoglobin A1C (%)   Date Value   10/14/2021 6.7   03/12/2021 7.0   10/12/2020 7.4     LDL Calculated (mg/dL)   Date Value   10/12/2020 79     AST (U/L)   Date Value   10/12/2020 26     ALT (U/L)   Date Value   10/12/2020 22     BUN (mg/dL)   Date Value   10/12/2020 16      (goal A1C is < 7)   (goal LDL is <100) need 30-50% reduction from baseline     BP Readings from Last 3 Encounters:   10/14/21 (!) 179/89   07/14/21 131/82   05/13/21 (!) 144/84    (goal /80)      All Future Testing planned in [facility]:  Lab Frequency Next Occurrence   JAYA DIGITAL SCREEN W OR WO CAD BILATERAL Once 05/21/2021   Microalbumin, Ur Once 11/13/2021   Lipid, Fasting Once 11/13/2021   Comprehensive Metabolic Panel, Fasting Once 01/22/2022       Next Visit Date:  Future Appointments   Date Time Provider Augustina Lopez   11/16/2021  2:00 PM Hong Jose stv psych [facility]   4/14/2022 10:00 AM Creed Expose, APRN - CNP [facility] WALK IN [facility]

## 2021-10-31 ENCOUNTER — APPOINTMENT (OUTPATIENT)
Dept: GENERAL RADIOLOGY | Age: 71
End: 2021-10-31
Payer: MEDICARE

## 2021-10-31 ENCOUNTER — HOSPITAL ENCOUNTER (EMERGENCY)
Age: 71
Discharge: HOME | End: 2021-10-31
Attending: EMERGENCY MEDICINE
Payer: MEDICARE

## 2021-10-31 VITALS
WEIGHT: 142 LBS | TEMPERATURE: 98.3 F | HEIGHT: 55 IN | RESPIRATION RATE: 16 BRPM | BODY MASS INDEX: 32.86 KG/M2 | OXYGEN SATURATION: 97 % | SYSTOLIC BLOOD PRESSURE: 138 MMHG | HEART RATE: 86 BPM | DIASTOLIC BLOOD PRESSURE: 76 MMHG

## 2021-10-31 DIAGNOSIS — R19.7 NAUSEA VOMITING AND DIARRHEA: ICD-10-CM

## 2021-10-31 DIAGNOSIS — U07.1 COVID-19: Primary | ICD-10-CM

## 2021-10-31 DIAGNOSIS — R11.2 NAUSEA VOMITING AND DIARRHEA: ICD-10-CM

## 2021-10-31 LAB
-: ABNORMAL
ABSOLUTE EOS #: 0 K/UL (ref 0–0.4)
ABSOLUTE IMMATURE GRANULOCYTE: 0.04 K/UL (ref 0–0.3)
ABSOLUTE LYMPH #: 0.47 K/UL (ref 1–4.8)
ABSOLUTE MONO #: 0.69 K/UL (ref 0.2–0.8)
ALBUMIN SERPL-MCNC: 3.6 G/DL (ref 3.5–5.2)
ALBUMIN/GLOBULIN RATIO: ABNORMAL (ref 1–2.5)
ALP BLD-CCNC: 80 U/L (ref 35–104)
ALT SERPL-CCNC: 47 U/L (ref 5–33)
AMORPHOUS: ABNORMAL
ANION GAP SERPL CALCULATED.3IONS-SCNC: 16 MMOL/L (ref 9–17)
AST SERPL-CCNC: 62 U/L
BACTERIA: ABNORMAL
BASOPHILS # BLD: 0 %
BASOPHILS ABSOLUTE: 0 K/UL (ref 0–0.2)
BILIRUB SERPL-MCNC: 0.58 MG/DL (ref 0.3–1.2)
BILIRUBIN DIRECT: 0.18 MG/DL
BILIRUBIN URINE: NEGATIVE
BILIRUBIN, INDIRECT: 0.4 MG/DL (ref 0–1)
BUN BLDV-MCNC: 15 MG/DL (ref 8–23)
BUN/CREAT BLD: 16 (ref 9–20)
CALCIUM SERPL-MCNC: 9 MG/DL (ref 8.6–10.4)
CASTS UA: ABNORMAL /LPF
CHLORIDE BLD-SCNC: 90 MMOL/L (ref 98–107)
CO2: 22 MMOL/L (ref 20–31)
COLOR: YELLOW
COMMENT UA: ABNORMAL
CREAT SERPL-MCNC: 0.91 MG/DL (ref 0.5–0.9)
CRYSTALS, UA: ABNORMAL /HPF
DIFFERENTIAL TYPE: ABNORMAL
EOSINOPHILS RELATIVE PERCENT: 0 % (ref 1–4)
EPITHELIAL CELLS UA: ABNORMAL /HPF (ref 0–5)
GFR AFRICAN AMERICAN: >60 ML/MIN
GFR NON-AFRICAN AMERICAN: >60 ML/MIN
GFR SERPL CREATININE-BSD FRML MDRD: ABNORMAL ML/MIN/{1.73_M2}
GFR SERPL CREATININE-BSD FRML MDRD: ABNORMAL ML/MIN/{1.73_M2}
GLOBULIN: ABNORMAL G/DL (ref 1.5–3.8)
GLUCOSE BLD-MCNC: 192 MG/DL (ref 70–99)
GLUCOSE URINE: NEGATIVE
HCT VFR BLD CALC: 42.3 % (ref 36.3–47.1)
HEMOGLOBIN: 14.6 G/DL (ref 11.9–15.1)
IMMATURE GRANULOCYTES: 1 %
KETONES, URINE: NEGATIVE
LACTIC ACID: 1.6 MMOL/L (ref 0.5–2.2)
LEUKOCYTE ESTERASE, URINE: NEGATIVE
LYMPHOCYTES # BLD: 11 % (ref 24–44)
MCH RBC QN AUTO: 29.3 PG (ref 25.2–33.5)
MCHC RBC AUTO-ENTMCNC: 34.5 G/DL (ref 28.4–34.8)
MCV RBC AUTO: 84.9 FL (ref 82.6–102.9)
MONOCYTES # BLD: 16 % (ref 1–7)
MUCUS: ABNORMAL
NITRITE, URINE: NEGATIVE
NRBC AUTOMATED: 0 PER 100 WBC
OTHER OBSERVATIONS UA: ABNORMAL
PDW BLD-RTO: 12.3 % (ref 11.8–14.4)
PH UA: 6.5 (ref 5–8)
PLATELET # BLD: 213 K/UL (ref 138–453)
PLATELET ESTIMATE: ABNORMAL
PMV BLD AUTO: 10.7 FL (ref 8.1–13.5)
POTASSIUM SERPL-SCNC: 4 MMOL/L (ref 3.7–5.3)
PROTEIN UA: NEGATIVE
RBC # BLD: 4.98 M/UL (ref 3.95–5.11)
RBC # BLD: ABNORMAL 10*6/UL
RBC UA: ABNORMAL /HPF (ref 0–2)
RENAL EPITHELIAL, UA: ABNORMAL /HPF
SARS-COV-2, RAPID: DETECTED
SEG NEUTROPHILS: 72 % (ref 36–66)
SEGMENTED NEUTROPHILS ABSOLUTE COUNT: 3.1 K/UL (ref 1.8–7.7)
SODIUM BLD-SCNC: 128 MMOL/L (ref 135–144)
SPECIFIC GRAVITY UA: 1.01 (ref 1–1.03)
SPECIMEN DESCRIPTION: ABNORMAL
TOTAL PROTEIN: 7.3 G/DL (ref 6.4–8.3)
TRICHOMONAS: ABNORMAL
TURBIDITY: ABNORMAL
URINE HGB: NEGATIVE
UROBILINOGEN, URINE: NORMAL
WBC # BLD: 4.3 K/UL (ref 3.5–11.3)
WBC # BLD: ABNORMAL 10*3/UL
WBC UA: ABNORMAL /HPF (ref 0–5)
YEAST: ABNORMAL

## 2021-10-31 PROCEDURE — 99283 EMERGENCY DEPT VISIT LOW MDM: CPT

## 2021-10-31 PROCEDURE — 85025 COMPLETE CBC W/AUTO DIFF WBC: CPT

## 2021-10-31 PROCEDURE — 6370000000 HC RX 637 (ALT 250 FOR IP): Performed by: NURSE PRACTITIONER

## 2021-10-31 PROCEDURE — 96374 THER/PROPH/DIAG INJ IV PUSH: CPT

## 2021-10-31 PROCEDURE — 2580000003 HC RX 258: Performed by: NURSE PRACTITIONER

## 2021-10-31 PROCEDURE — 6360000002 HC RX W HCPCS: Performed by: NURSE PRACTITIONER

## 2021-10-31 PROCEDURE — 81001 URINALYSIS AUTO W/SCOPE: CPT

## 2021-10-31 PROCEDURE — 87635 SARS-COV-2 COVID-19 AMP PRB: CPT

## 2021-10-31 PROCEDURE — 71045 X-RAY EXAM CHEST 1 VIEW: CPT

## 2021-10-31 PROCEDURE — 80076 HEPATIC FUNCTION PANEL: CPT

## 2021-10-31 PROCEDURE — 83605 ASSAY OF LACTIC ACID: CPT

## 2021-10-31 PROCEDURE — 80048 BASIC METABOLIC PNL TOTAL CA: CPT

## 2021-10-31 RX ORDER — ONDANSETRON 4 MG/1
4 TABLET, ORALLY DISINTEGRATING ORAL EVERY 8 HOURS PRN
Qty: 20 TABLET | Refills: 0 | Status: SHIPPED | OUTPATIENT
Start: 2021-10-31 | End: 2022-08-15 | Stop reason: ALTCHOICE

## 2021-10-31 RX ORDER — ONDANSETRON 2 MG/ML
4 INJECTION INTRAMUSCULAR; INTRAVENOUS ONCE
Status: COMPLETED | OUTPATIENT
Start: 2021-10-31 | End: 2021-10-31

## 2021-10-31 RX ORDER — ACETAMINOPHEN 500 MG
TABLET ORAL
Qty: 30 TABLET | Refills: 0 | Status: SHIPPED | OUTPATIENT
Start: 2021-10-31

## 2021-10-31 RX ORDER — 0.9 % SODIUM CHLORIDE 0.9 %
1000 INTRAVENOUS SOLUTION INTRAVENOUS ONCE
Status: COMPLETED | OUTPATIENT
Start: 2021-10-31 | End: 2021-10-31

## 2021-10-31 RX ORDER — ONDANSETRON 2 MG/ML
4 INJECTION INTRAMUSCULAR; INTRAVENOUS ONCE
Status: DISCONTINUED | OUTPATIENT
Start: 2021-10-31 | End: 2021-10-31 | Stop reason: HOSPADM

## 2021-10-31 RX ORDER — ACETAMINOPHEN 325 MG/1
650 TABLET ORAL ONCE
Status: COMPLETED | OUTPATIENT
Start: 2021-10-31 | End: 2021-10-31

## 2021-10-31 RX ORDER — BENZONATATE 100 MG/1
200 CAPSULE ORAL ONCE
Status: DISCONTINUED | OUTPATIENT
Start: 2021-10-31 | End: 2021-10-31 | Stop reason: HOSPADM

## 2021-10-31 RX ORDER — BENZONATATE 100 MG/1
100-200 CAPSULE ORAL 3 TIMES DAILY PRN
Qty: 40 CAPSULE | Refills: 0 | Status: SHIPPED | OUTPATIENT
Start: 2021-10-31 | End: 2021-11-07

## 2021-10-31 RX ADMIN — ONDANSETRON 4 MG: 2 INJECTION INTRAMUSCULAR; INTRAVENOUS at 13:22

## 2021-10-31 RX ADMIN — SODIUM CHLORIDE 1000 ML: 9 INJECTION, SOLUTION INTRAVENOUS at 13:22

## 2021-10-31 RX ADMIN — ACETAMINOPHEN 650 MG: 325 TABLET ORAL at 13:49

## 2021-10-31 ASSESSMENT — ENCOUNTER SYMPTOMS
BACK PAIN: 0
NAUSEA: 0
ABDOMINAL PAIN: 0
COUGH: 1
SORE THROAT: 0
VOMITING: 0
SHORTNESS OF BREATH: 0
DIARRHEA: 0
COLOR CHANGE: 0

## 2021-10-31 ASSESSMENT — PAIN SCALES - GENERAL: PAINLEVEL_OUTOF10: 6

## 2021-10-31 NOTE — ED PROVIDER NOTES
Team [address] ED  eMERGENCY dEPARTMENT eNCOUnter      Pt Name: Bernie Villarreal  MRN: [id number]  [place] 1950  Date of evaluation: 10/31/2021  Provider: RADHIKA Robles 5638       Chief Complaint   Patient presents with    Cough     onset a 8ndays ago    Emesis    Fever         HISTORY OF PRESENT ILLNESS  (Location/Symptom, Timing/Onset, Context/Setting, Quality, Duration, Modifying Factors, Severity.)   Bernie Villarreal is a 80 y.o. female who presents to the emergency department via private auto for cough, fever, N/V/D, body aches, pain with inspiration. Onset was 8 days ago. She is febrile. She initially thought her N/V/D was from food she ate at a wedding. Denies SOB, abd pain. Rates her pain 0/10 at this time. Nursing Notes were reviewed. ALLERGIES     Amoxicillin-pot clavulanate and Benadryl [diphenhydramine hcl]    CURRENT MEDICATIONS       Previous Medications    ACYCLOVIR (ZOVIRAX) 400 MG TABLET    Take 400 mg by mouth    ATORVASTATIN (LIPITOR) 10 MG TABLET    Take 1 tablet by mouth daily    BLOOD GLUCOSE MONITOR STRIPS    Provide insurance covered test strips compatible with glucometer, test 1 times a day    FOLIC ACID (FOLVITE) 1 MG TABLET    Take 1 tablet by mouth daily    HYDROCHLOROTHIAZIDE (HYDRODIURIL) 12.5 MG TABLET        LANCETS MISC    Please Dispense appropriate insurance approved lancets for patients glucometer. LISINOPRIL (PRINIVIL;ZESTRIL) 40 MG TABLET        MAGNESIUM PO    Take 800 mg by mouth    METFORMIN (GLUCOPHAGE) 500 MG TABLET    TAKE 1 TABLET TWICE A DAY WITH MEALS    METHOTREXATE (RHEUMATREX) 2.5 MG CHEMO TABLET        METOPROLOL SUCCINATE (TOPROL XL) 25 MG EXTENDED RELEASE TABLET        OMEGA-3 FATTY ACIDS-VITAMIN E PO    Take 1,000 mg by mouth    POTASSIUM CHLORIDE (KLOR-CON) 10 MEQ EXTENDED RELEASE TABLET    Take by mouth    PREDNISONE (DELTASONE) 5 MG TABLET    Take 5 mg by mouth daily.          PAST MEDICAL HISTORY Diagnosis Date    DJD (degenerative joint disease) of cervical spine     Hyperlipidemia     Hypertension     Obesity     Sleep disorder     Type 2 diabetes mellitus without complication, without long-term current use of insulin ([facility] [address] 2016       SURGICAL HISTORY           Procedure Laterality Date    HYSTERECTOMY           FAMILY HISTORY           Problem Relation Age of Onset    Diabetes Mother     Arthritis Father     Cancer Father     Cancer Sister     Cancer Brother     Lupus Other      Family Status   Relation Name Status    Mother      Father      Sister      Brother      Other  Alive        SOCIAL HISTORY      reports that she has never smoked. She has never used smokeless tobacco. She reports that she does not drink alcohol and does not use drugs. REVIEW OF SYSTEMS    (2-9 systems for level 4, 10 or more for level 5)     Review of Systems   Constitutional: Positive for fatigue and fever. Negative for chills and diaphoresis. HENT: Negative for congestion and sore throat. Respiratory: Positive for cough. Negative for shortness of breath. Cardiovascular: Negative for chest pain. Gastrointestinal: Negative for abdominal pain, diarrhea, nausea and vomiting. Genitourinary: Negative for dysuria, flank pain, frequency, hematuria and urgency. Musculoskeletal: Positive for arthralgias and myalgias. Negative for back pain. Skin: Negative for color change, rash and wound. Neurological: Negative for dizziness, weakness, light-headedness and headaches. Except as noted above the remainder of the review of systems was reviewed and negative. PHYSICAL EXAM    (up to 7 for level 4, 8 or more for level 5)     ED Triage Vitals [10/31/21 1240]   BP Temp Temp Source Pulse Resp SpO2 Height Weight   (!) 146/77 102.4 °F (39.1 °C) Oral 88 16 97 % 2' (0.61 m) 142 lb (64.4 kg)     Physical Exam  Vitals reviewed.    Constitutional:       General: She is not in acute distress. Appearance: She is well-developed. She is not diaphoretic. Comments: Pt is speaking in full sentences, handling secretions well. HENT:      Right Ear: External ear normal.      Left Ear: External ear normal.   Eyes:      General: No scleral icterus. Conjunctiva/sclera: Conjunctivae normal.   Cardiovascular:      Rate and Rhythm: Normal rate and regular rhythm. Heart sounds: Normal heart sounds. Pulmonary:      Effort: Pulmonary effort is normal. No respiratory distress. Breath sounds: Normal breath sounds. No stridor. No wheezing or rales. Abdominal:      General: There is no distension. Palpations: Abdomen is soft. Tenderness: There is no abdominal tenderness. There is no guarding. Musculoskeletal:      Comments: Moves extremities   Skin:     General: Skin is warm and dry. Findings: No rash. Neurological:      Mental Status: She is alert and oriented to person, place, and time. GCS: GCS eye subscore is 4. GCS verbal subscore is 5. GCS motor subscore is 6. Motor: Motor function is intact. Coordination: Coordination is intact. Gait: Gait is intact. Psychiatric:         Behavior: Behavior normal.           DIAGNOSTIC RESULTS     RADIOLOGY:   Non-plain film images such as CT, Ultrasound and MRI are read by the radiologist. Plain radiographic images are visualized and preliminarily interpreted by the emergency physician with the below findings:    Interpretation per the Radiologist below, if available at the time of this note:    XR CHEST PORTABLE    Result Date: 10/31/2021  EXAMINATION: [address] [place] [address] 10/31/2021 1:20 pm COMPARISON: None. HISTORY: ORDERING SYSTEM PROVIDED HISTORY: cough, fever TECHNOLOGIST PROVIDED HISTORY: cough, fever Acuity: Acute Type of Exam: Unknown FINDINGS: AP portable view of the chest time stamped at 1323 hours demonstrates low lung volumes.   Scattered pulmonary opacities predominantly in the right perihilar area and left base are noted favoring multifocal airspace disease. Small left effusion is suggested. No extrapleural air is seen. Heart size is normal.     Low lung volumes. Scattered pulmonary opacities favoring multifocal airspace disease. LABS:  Labs Reviewed   COVID-19, RAPID - Abnormal; Notable for the following components:       Result Value    SARS-CoV-2, Rapid DETECTED (*)     All other components within normal limits   BASIC METABOLIC PANEL - Abnormal; Notable for the following components:    Glucose 192 (*)     CREATININE 0.91 (*)     Sodium 128 (*)     Chloride 90 (*)     All other components within normal limits   CBC WITH AUTO DIFFERENTIAL - Abnormal; Notable for the following components:    Seg Neutrophils 72 (*)     Lymphocytes 11 (*)     Monocytes 16 (*)     Eosinophils % 0 (*)     Immature Granulocytes 1 (*)     Absolute Lymph # 0.47 (*)     All other components within normal limits   HEPATIC FUNCTION PANEL - Abnormal; Notable for the following components:    ALT 47 (*)     AST 62 (*)     All other components within normal limits   URINALYSIS WITH MICROSCOPIC - Abnormal; Notable for the following components:    Turbidity UA SLIGHTLY CLOUDY (*)     Mucus, UA 1+ (*)     All other components within normal limits   LACTIC ACID       All other labs were within normal range or not returned as of this dictation.     EMERGENCY DEPARTMENT COURSE and DIFFERENTIAL DIAGNOSIS/MDM:   Vitals:    Vitals:    10/31/21 1240 10/31/21 1430   BP: (!) 146/77 138/76   Pulse: 88 86   Resp: 16 16   Temp: 102.4 °F (39.1 °C) 98.3 °F (36.8 °C)   TempSrc: Oral    SpO2: 97%    Weight: 142 lb (64.4 kg)    Height: 2' (0.61 m)        MEDICATIONS GIVEN IN THE ED:  Medications   ondansetron (ZOFRAN) injection 4 mg (has no administration in time range)   benzonatate (TESSALON) capsule 200 mg (has no administration in time range)   0.9 % sodium chloride bolus (0 mLs IntraVENous Stopped 10/31/21 1518) ondansetron [facility]) injection 4 mg (4 mg IntraVENous Given 10/31/21 1322)   acetaminophen (TYLENOL) tablet 650 mg (650 mg Oral Given 10/31/21 1349)       CLINICAL DECISION MAKING:  The patient presented alert with a nontoxic appearance and was seen in conjunction with Dr. Alexandria Capellan. VS were unremarkable. She was given medication for her symptoms, discomfort here. She is Covid-19 positive. Prescriptions were written for tylenol, tessalon perles, and Zofran. Follow up with pcp for a recheck. Isolation and return precautions were provided. Evaluation and treatment course in the ED, and plan of care upon discharge was discussed in length with the patient. Patient had no further questions prior to being discharged and was instructed to return to the ED for new or worsening symptoms. Care was provided during an unprecedented national emergency due to the novel coronavirus, Covid-19. The patient's signs and symptoms are consistent with an acute mild viral illness. At this time there is significant evidence of Covid-19 community spread due to this pandemic, and I feel the patient most likely has mild Covid-19 or other viral illness. The patient is nontoxic and well appearing, no evidence of hypoxia or impending respiratory failure. The patient is tolerating PO. I do not feel the patient has evidence of significant dehydration or end organ failure at this time. I do not feel the patient has an emergent medical condition at this time. Direct patient contact is avoided at this time due to the critically low supplies of PPE worldwide and an effort to zan appropriate use of PPE. I performed a medical screening exam that is compliant with the Declaration of [facility] Emergency in the [place], secondary to the Pandemic Infectious Disease of SARS-Coronavirus-2.      We are not testing for influenza or other viral etiologies at this time due to the significant evidence of virus coinfections during this pandemic. The patient is referred to appropriate outpatient follow up through their PCP or [facility]. I discussed with the patient home isolation measures, anticipatory guidance, discharge instructions, and to return immediately for worsening of symptoms. The patient is agreeable with this plan. FINAL IMPRESSION      1. COVID-19    2. Nausea vomiting and diarrhea            Problem List  Patient Active Problem List   Diagnosis Code    Hyperlipidemia E78.5    DJD (degenerative joint disease) of cervical spine M47.812    Meningioma ([address] D32.9    FH: breast cancer in first degree relative Z80.3    Fibrocystic breast disease in female N62.18    Essential hypertension I10    Type 2 diabetes mellitus without complication, without long-term current use of insulin ([address] E11.9    Benign neoplasm of cerebral meninges ([address] D32.0    FH: breast cancer in relative when <39years old Z80.2    Rheumatoid arthritis involving multiple sites with positive rheumatoid factor ([address] M05.79    Genital herpes simplex A60.00         DISPOSITION/PLAN   DISPOSITION  DISCHARGE      PATIENT REFERRED TO:   Valentina Herron, RADHIKA - CNP  [address]  [phone]    Schedule an appointment as soon as possible for a visit       [facility] ED  [address] [facility]  [phone]    If symptoms worsen, As needed      DISCHARGE MEDICATIONS:     New Prescriptions    ACETAMINOPHEN (TYLENOL) 500 MG TABLET    Take 1-2 tabs q4-6h prn fever, pain. Do not exceed 3g daily.     BENZONATATE (TESSALON) 100 MG CAPSULE    Take 1-2 capsules by mouth 3 times daily as needed for Cough    ONDANSETRON (ZOFRAN ODT) 4 MG DISINTEGRATING TABLET    Take 1 tablet by mouth every 8 hours as needed for Nausea or Vomiting           (Please note that portions of this note were completed with a voice recognition program.  Efforts were made to edit the dictations but occasionally words are mis-transcribed.)    RADHIKA Breen - RADHIKA Rivera CNP  10/31/21 4273

## 2021-10-31 NOTE — ED PROVIDER NOTES
eMERGENCY dEPARTMENT eNCOUnter   Independent Attestation     Pt Name: Crystal Hernandez  MRN: [id number]  [place] 1950  Date of evaluation: 10/31/21     Crystal Hernandez is a 80 y.o. female with CC: Cough (onset a 8ndays ago), Emesis, and Fever      Based on the medical record the care appears appropriate. I was personally available for consultation in the Emergency Department. The care is provided during an unprecedented national emergency due to the novel coronavirus, COVID 19.     Aline Grossman DO  Attending Emergency Physician                    Domonique Bautista DO  10/31/21 1535

## 2021-11-01 ENCOUNTER — CARE COORDINATION (OUTPATIENT)
Dept: CARE COORDINATION | Age: 71
End: 2021-11-01

## 2021-11-01 NOTE — CARE COORDINATION
Called, message left on voicemail with my contact information and reason for call.  Will attempt 2nd call 11/2/21 if no return call today

## 2021-11-02 ENCOUNTER — CARE COORDINATION (OUTPATIENT)
Dept: CARE COORDINATION | Age: 71
End: 2021-11-02

## 2021-11-11 ENCOUNTER — VIRTUAL VISIT (OUTPATIENT)
Dept: PRIMARY CARE CLINIC | Age: 71
End: 2021-11-11
Payer: MEDICARE

## 2021-11-11 DIAGNOSIS — U07.1 COVID-19 VIRUS INFECTION: Primary | ICD-10-CM

## 2021-11-11 PROCEDURE — 99442 PR PHYS/QHP TELEPHONE EVALUATION 11-20 MIN: CPT | Performed by: NURSE PRACTITIONER

## 2021-11-11 ASSESSMENT — PATIENT HEALTH QUESTIONNAIRE - PHQ9
2. FEELING DOWN, DEPRESSED OR HOPELESS: 0
SUM OF ALL RESPONSES TO PHQ QUESTIONS 1-9: 0
SUM OF ALL RESPONSES TO PHQ9 QUESTIONS 1 & 2: 0
1. LITTLE INTEREST OR PLEASURE IN DOING THINGS: 0
SUM OF ALL RESPONSES TO PHQ QUESTIONS 1-9: 0
SUM OF ALL RESPONSES TO PHQ QUESTIONS 1-9: 0

## 2021-11-11 ASSESSMENT — ENCOUNTER SYMPTOMS
SHORTNESS OF BREATH: 0
RHINORRHEA: 0
CONSTIPATION: 0
DIARRHEA: 0
SORE THROAT: 0
VOMITING: 0
TROUBLE SWALLOWING: 0
EYE REDNESS: 0
EYE ITCHING: 0
SINUS PAIN: 0
COUGH: 0
NAUSEA: 0
WHEEZING: 0

## 2021-11-11 NOTE — PROGRESS NOTES
Visit Information    Have you changed or started any medications since your last visit including any over-the-counter medicines, vitamins, or herbal medicines? no   Are you having any side effects from any of your medications? -  no  Have you stopped taking any of your medications? Is so, why? -  no    Have you seen any other physician or provider since your last visit? Yes - Records Obtained  Have you had any other diagnostic tests since your last visit? Yes - Records Obtained  Have you been seen in the emergency room and/or had an admission to a hospital since we last saw you? Yes - Records Obtained  Have you had your routine dental cleaning in the past 6 months? no    Have you activated your [employer] account? If not, what are your barriers?  Yes     Patient Care Team:  RADHIKA John CNP as PCP - General (Family Medicine)  RADHIKA John CNP as PCP - [facility]  RADHIKA Jacob CNP as Consulting Physician (Rheumatology)  Amos Thomas MD as Consulting Physician (Cardiology)    Medical History Review  Past Medical, Family, and Social History reviewed and does not contribute to the patient presenting condition    Health Maintenance   Topic Date Due    COVID-19 Vaccine (1) Never done    DTaP/Tdap/Td vaccine (1 - Tdap) Never done    Shingles Vaccine (1 of 2) Never done    Pneumococcal 65+ years Vaccine (1 of 1 - PPSV23) Never done    Diabetic foot exam  10/12/2021    Diabetic microalbuminuria test  10/12/2021    Lipid screen  10/12/2021    Flu vaccine (1) 06/30/2022 (Originally 9/1/2021)    Annual Wellness Visit (AWV)  03/13/2022    Breast cancer screen  06/03/2022    A1C test (Diabetic or Prediabetic)  10/14/2022    Diabetic retinal exam  10/29/2022    Potassium monitoring  10/31/2022    Creatinine monitoring  10/31/2022    Colon cancer screen colonoscopy  08/27/2029    DEXA (modify frequency per FRAX score)  Addressed    Hepatitis C screen  Addressed   [facility] Hepatitis A vaccine  Aged Out    Hib vaccine  Aged Out    Meningococcal (ACWY) vaccine  Aged Out

## 2021-11-11 NOTE — PROGRESS NOTES
[facility]  [id number] [facility] [address]  Dept: [phone]  Dept Fax: [phone]    Patient Care Team:  RADHIKA Su CNP as PCP - General (Family Medicine)  RADHIKA Su CNP as PCP - [address] [place] [address] Provider  RADHIKA Smith CNP as Consulting Physician (Rheumatology)  Mariah Valdes MD as Consulting Physician (Cardiology)    2021     Darleen Marlow is a 80 y.o. female evaluated via telephone on 2021. Consent:  She and/or health care decision maker is aware that that she may receive a bill for this telephone service, depending on her insurance coverage, and has provided verbal consent to proceed: Yes      Documentation:  I communicated with the patient and/or health care decision maker about COVID-19 infection. Details of this discussion including any medical advice provided: Laurie Nielsen affirm this is a Patient Initiated Episode with a Patient who has not had a related appointment within my department in the past 7 days or scheduled within the next 24 hours. Patient identification was verified at the start of the visit: Yes    Total Time: minutes: 5-10 minutes    The visit was conducted pursuant to the emergency declaration under the [address] [place] [address] authority and the "[employer] and [employer] General Act. Patient identification was verified, and a caregiver was present when appropriate. The patient was located in a state where the provider was credentialed to provide care.     Note: not billable if this call serves to triage the patient into an appointment for the relevant concern      RADHIKA Su CNP         Darleen Marlow (:  1950)is a 80 y.o. female, here for evaluation of the following medical concerns:   Chief Complaint   Patient presents with   [facility] ED Follow-up Latesha Rodriguez presents today for a telephone visit follow-up for COVID-19 infection. She was seen on October 31 for nausea vomiting and diarrhea. Onset 8 days prior. Kip Blanco states her symptoms have resolved since her ER visit. Pt states she is feeling better today, \"I cannot complain\". Denies current pain  Cough has resolved, smell and taste are back. She is no longer having GI issues. No nausea, vomiting, or diarrhea. She is inquiring if she needs to continue utilizing Powerade for hydration as she is not having trouble tolerating a normal diet. .    Review of Systems   Constitutional: Negative for chills, fatigue and fever. HENT: Negative for congestion, ear discharge, ear pain, hearing loss, postnasal drip, rhinorrhea, sinus pain, sneezing, sore throat and trouble swallowing. Eyes: Negative for redness and itching. Respiratory: Negative for cough, shortness of breath and wheezing. Cardiovascular: Negative for chest pain and leg swelling. Gastrointestinal: Negative for constipation, diarrhea, nausea and vomiting. Musculoskeletal: Negative for myalgias. Neurological: Negative for dizziness. Prior to Visit Medications    Medication Sig Taking? Authorizing Provider   ondansetron (ZOFRAN ODT) 4 MG disintegrating tablet Take 1 tablet by mouth every 8 hours as needed for Nausea or Vomiting Yes RADHIKA Leonard CNP   acetaminophen (TYLENOL) 500 MG tablet Take 1-2 tabs q4-6h prn fever, pain. Do not exceed 3g daily.  Yes RADHIKA Leonard CNP   blood glucose monitor strips Provide insurance covered test strips compatible with glucometer, test 1 times a day Yes Buelah Lesches, APRN - CNP   lisinopril (PRINIVIL;ZESTRIL) 40 MG tablet  Yes Historical Provider, MD   metoprolol succinate (TOPROL XL) 25 MG extended release tablet  Yes Historical Provider, MD   atorvastatin (LIPITOR) 10 MG tablet Take 1 tablet by mouth daily Yes Buelah Lesches, APRN - CNP   metFORMIN (GLUCOPHAGE) 500 MG tablet TAKE 1 TABLET TWICE A DAY WITH MEALS Yes RADHIKA Elkins - ALEX   hydroCHLOROthiazide (HYDRODIURIL) 12.5 MG tablet  Yes Historical Provider, MD   MAGNESIUM PO Take 800 mg by mouth Yes Historical Provider, MD   folic acid (FOLVITE) 1 MG tablet Take 1 tablet by mouth daily Yes Live Bell PA-C   Lancets MISC Please Dispense appropriate insurance approved lancets for patients glucometer. Yes Live Bell PA-C   OMEGA-3 FATTY ACIDS-VITAMIN E PO Take 1,000 mg by mouth Yes Historical Provider, MD   potassium chloride (KLOR-CON) 10 MEQ extended release tablet Take by mouth Yes Historical Provider, MD   acyclovir (ZOVIRAX) 400 MG tablet Take 400 mg by mouth Yes Historical Provider, MD   methotrexate (RHEUMATREX) 2.5 MG chemo tablet  Yes Historical Provider, MD   predniSONE (DELTASONE) 5 MG tablet Take 5 mg by mouth daily. Yes Historical Provider, MD        Social History     Tobacco Use    Smoking status: Never Smoker    Smokeless tobacco: Never Used   Substance Use Topics    Alcohol use: No        There were no vitals filed for this visit. Estimated body mass index is 173.33 kg/m² as calculated from the following:    Height as of 10/31/21: 2' (0.61 m). Weight as of 10/31/21: 142 lb (64.4 kg). DIAGNOSTIC FINDINGS:  CBC:  Lab Results   Component Value Date    WBC 4.3 10/31/2021    HGB 14.6 10/31/2021     10/31/2021       BMP:    Lab Results   Component Value Date     10/31/2021    K 4.0 10/31/2021    CL 90 10/31/2021    CO2 22 10/31/2021    BUN 15 10/31/2021    CREATININE 0.91 10/31/2021    GLUCOSE 192 10/31/2021       HEMOGLOBIN A1C:   Lab Results   Component Value Date    LABA1C 6.7 10/14/2021       FASTING LIPID PANEL:  Lab Results   Component Value Date    CHOL 180 10/12/2020    HDL 71 (A) 10/12/2020    TRIG 151 10/12/2020           ASSESSMENT     Diagnosis Orders   1.  COVID-19 virus infection            PLAN:  No orders of the defined types were placed in this encounter. 1. COVID-19 symptoms resolved. Patient educated that she no longer requires quarantine. My discontinue Powerade and resume her normal diet. 2. She wishes to cancel her behavioral health appointment on the 16th, states things are better at home. I advised her I would forward that information to her counselor, Jackie Sena. FOLLOW UP AND INSTRUCTIONS:  No follow-ups on file. · Freida received counseling on the following healthy behaviors:medication adherence    · Discussed use, benefit, and side effects of prescribed medications. Barriers to  medication compliance addressed. All patient questions answered. Pt  verbalized understanding of all instructions given. · Patient given educational materials - see patient instructions      · Patient advised to contact scheduling offices for any referrals or imaging orders  placed today if they have not been contacted in 48 hours. No follow-ups on file. An electronic signature was used to authenticate this note.     --RADHIKA Tom - CNP on 11/11/2021 at 2:19 PM

## 2021-11-16 ENCOUNTER — CARE COORDINATION (OUTPATIENT)
Dept: CARE COORDINATION | Age: 71
End: 2021-11-16

## 2021-11-16 NOTE — CARE COORDINATION
Call from patient, states that she is no longer having any symptoms of COVID. Saw PCP as a VV on 11/11/21. PCP advised her that she could come out of quarantine as she wan't having any Sx and it was past 10 day quarantine period. Patient as no other needs at this time.  Contact information provided

## 2021-12-02 ENCOUNTER — OFFICE VISIT (OUTPATIENT)
Dept: PRIMARY CARE CLINIC | Age: 71
End: 2021-12-02
Payer: MEDICARE

## 2021-12-02 VITALS
DIASTOLIC BLOOD PRESSURE: 91 MMHG | SYSTOLIC BLOOD PRESSURE: 161 MMHG | TEMPERATURE: 97 F | OXYGEN SATURATION: 95 % | HEART RATE: 64 BPM

## 2021-12-02 DIAGNOSIS — J02.9 SORE THROAT: ICD-10-CM

## 2021-12-02 DIAGNOSIS — J06.9 VIRAL URI WITH COUGH: Primary | ICD-10-CM

## 2021-12-02 DIAGNOSIS — J02.9 ACUTE PHARYNGITIS, UNSPECIFIED ETIOLOGY: ICD-10-CM

## 2021-12-02 LAB — S PYO AG THROAT QL: NORMAL

## 2021-12-02 PROCEDURE — 96372 THER/PROPH/DIAG INJ SC/IM: CPT

## 2021-12-02 PROCEDURE — 87880 STREP A ASSAY W/OPTIC: CPT

## 2021-12-02 PROCEDURE — 99212 OFFICE O/P EST SF 10 MIN: CPT

## 2021-12-02 RX ORDER — IBUPROFEN 600 MG/1
600 TABLET ORAL EVERY 6 HOURS PRN
Qty: 120 TABLET | Refills: 0 | Status: SHIPPED | OUTPATIENT
Start: 2021-12-02 | End: 2021-12-28

## 2021-12-02 RX ORDER — BENZONATATE 200 MG/1
200 CAPSULE ORAL 3 TIMES DAILY PRN
Qty: 30 CAPSULE | Refills: 0 | Status: SHIPPED | OUTPATIENT
Start: 2021-12-02 | End: 2021-12-29 | Stop reason: SDUPTHER

## 2021-12-02 RX ORDER — LIDOCAINE HYDROCHLORIDE 20 MG/ML
10 SOLUTION OROPHARYNGEAL
Qty: 100 ML | Refills: 0 | Status: SHIPPED | OUTPATIENT
Start: 2021-12-02 | End: 2022-08-15 | Stop reason: ALTCHOICE

## 2021-12-02 RX ORDER — DEXAMETHASONE SODIUM PHOSPHATE 10 MG/ML
10 INJECTION INTRAMUSCULAR; INTRAVENOUS ONCE
Status: COMPLETED | OUTPATIENT
Start: 2021-12-02 | End: 2021-12-02

## 2021-12-02 RX ADMIN — DEXAMETHASONE SODIUM PHOSPHATE 10 MG: 10 INJECTION INTRAMUSCULAR; INTRAVENOUS at 10:19

## 2021-12-02 ASSESSMENT — ENCOUNTER SYMPTOMS
EYE ITCHING: 1
COUGH: 1
SORE THROAT: 1
EYE DISCHARGE: 1
RHINORRHEA: 1

## 2021-12-02 NOTE — PATIENT INSTRUCTIONS
Imaging ordered, please have completed if cough continues for 1-2 weeks. Use cough suppressant as needed. Use Motrin as needed for pain or discomfort. Recommend using the viscous lidocaine as needed for throat irritation. Continue with throat lozenges, Chloraseptic spray, or tea with honey. Patient Education        Sore Throat: Care Instructions  Your Care Instructions     Infection by bacteria or a virus causes most sore throats. Cigarette smoke, dry air, air pollution, allergies, and yelling can also cause a sore throat. Sore throats can be painful and annoying. Fortunately, most sore throats go away on their own. If you have a bacterial infection, your doctor may prescribe antibiotics. Follow-up care is a key part of your treatment and safety. Be sure to make and go to all appointments, and call your doctor if you are having problems. It's also a good idea to know your test results and keep a list of the medicines you take. How can you care for yourself at home? · If your doctor prescribed antibiotics, take them as directed. Do not stop taking them just because you feel better. You need to take the full course of antibiotics. · Gargle with warm salt water once an hour to help reduce swelling and relieve discomfort. Use 1 teaspoon of salt mixed in 1 cup of warm water. · Take an over-the-counter pain medicine, such as acetaminophen (Tylenol), ibuprofen (Advil, Motrin), or naproxen (Aleve). Read and follow all instructions on the label. · Be careful when taking over-the-counter cold or flu medicines and Tylenol at the same time. Many of these medicines have acetaminophen, which is Tylenol. Read the labels to make sure that you are not taking more than the recommended dose. Too much acetaminophen (Tylenol) can be harmful. · Drink plenty of fluids. Fluids may help soothe an irritated throat. Hot fluids, such as tea or soup, may help decrease throat pain.   · Use over-the-counter throat lozenges to soothe

## 2021-12-02 NOTE — PROGRESS NOTES
[facility]  [facility]ALK IN [facility]  [address]  [facility] [place], [place] [address]    [address] WALK IN [facility]  [address] [address]  Dept: [phone]    Nj Najera is a 80 y.o. female Established patient, who presents to the walk-in clinic today with conditions/complaints as noted below:    Chief Complaint   Patient presents with    Cough    Pharyngitis    Other     itchiness under eyes          HPI:     URI   This is a new problem. The current episode started in the past 7 days (about 3-4 days ago). The problem has been unchanged. There has been no fever. Associated symptoms include coughing, rhinorrhea and a sore throat. Pertinent negatives include no congestion, ear pain, headaches or rash. Treatments tried: Chloraseptic spray. The treatment provided no relief. Past Medical History:   Diagnosis Date    DJD (degenerative joint disease) of cervical spine     Hyperlipidemia     Hypertension     Obesity     Sleep disorder     Type 2 diabetes mellitus without complication, without long-term current use of insulin ([facility]) 9/29/2016       Current Outpatient Medications   Medication Sig Dispense Refill    lidocaine viscous hcl (XYLOCAINE) 2 % SOLN solution Take 10 mLs by mouth every 3 hours as needed for Irritation 100 mL 0    benzonatate (TESSALON) 200 MG capsule Take 1 capsule by mouth 3 times daily as needed for Cough 30 capsule 0    ibuprofen (ADVIL;MOTRIN) 600 MG tablet Take 1 tablet by mouth every 6 hours as needed for Pain 120 tablet 0    ondansetron (ZOFRAN ODT) 4 MG disintegrating tablet Take 1 tablet by mouth every 8 hours as needed for Nausea or Vomiting 20 tablet 0    acetaminophen (TYLENOL) 500 MG tablet Take 1-2 tabs q4-6h prn fever, pain. Do not exceed 3g daily.  30 tablet 0    blood glucose monitor strips Provide insurance covered test strips compatible with glucometer, test 1 times a day 100 strip 5    lisinopril (PRINIVIL;ZESTRIL) 40 MG tablet       metoprolol succinate (TOPROL XL) 25 MG extended release tablet       atorvastatin (LIPITOR) 10 MG tablet Take 1 tablet by mouth daily 90 tablet 1    metFORMIN (GLUCOPHAGE) 500 MG tablet TAKE 1 TABLET TWICE A DAY WITH MEALS 180 tablet 3    hydroCHLOROthiazide (HYDRODIURIL) 12.5 MG tablet       MAGNESIUM PO Take 800 mg by mouth      folic acid (FOLVITE) 1 MG tablet Take 1 tablet by mouth daily 90 tablet 1    Lancets MISC Please Dispense appropriate insurance approved lancets for patients glucometer. 100 each 5    OMEGA-3 FATTY ACIDS-VITAMIN E PO Take 1,000 mg by mouth      potassium chloride (KLOR-CON) 10 MEQ extended release tablet Take by mouth      acyclovir (ZOVIRAX) 400 MG tablet Take 400 mg by mouth      methotrexate (RHEUMATREX) 2.5 MG chemo tablet       predniSONE (DELTASONE) 5 MG tablet Take 5 mg by mouth daily. Current Facility-Administered Medications   Medication Dose Route Frequency Provider Last Rate Last Admin    dexamethasone (DECADRON) injection 10 mg  10 mg IntraMUSCular Once RADHIKA Patel - CNP           Allergies   Allergen Reactions    Amoxicillin-Pot Clavulanate     Benadryl [Diphenhydramine Hcl] Rash       :     Review of Systems   Constitutional: Negative for chills, fatigue and fever. HENT: Positive for rhinorrhea and sore throat. Negative for congestion, ear pain and postnasal drip. Eyes: Positive for discharge (\"watering\") and itching. Respiratory: Positive for cough. Skin: Negative for rash. Neurological: Negative for headaches.       :     BP (!) 161/91   Pulse 64   Temp 97 °F (36.1 °C) (Temporal)   LMP  (LMP Unknown)   SpO2 95%     Physical Exam  Vitals reviewed. Constitutional:       General: She is not in acute distress. Appearance: Normal appearance. HENT:      Head: Normocephalic and atraumatic.       Right Ear: Tympanic membrane, ear canal and external ear normal.      Left Ear: Tympanic membrane, ear canal and external ear normal.      Nose: Nose normal. No congestion or rhinorrhea. Mouth/Throat:      Mouth: Mucous membranes are moist.      Pharynx: Oropharynx is clear. Posterior oropharyngeal erythema (mild) present. Tonsils: No tonsillar exudate. Eyes:      General:         Right eye: No discharge. Left eye: No discharge. Conjunctiva/sclera: Conjunctivae normal.   Cardiovascular:      Rate and Rhythm: Normal rate and regular rhythm. Heart sounds: Normal heart sounds. Pulmonary:      Effort: Pulmonary effort is normal.      Breath sounds: Normal breath sounds. Musculoskeletal:      Cervical back: Neck supple. Lymphadenopathy:      Cervical: No cervical adenopathy. Skin:     General: Skin is warm and dry. Findings: No rash. Neurological:      Mental Status: She is alert and oriented to person, place, and time. Psychiatric:         Attention and Perception: Attention normal.         Mood and Affect: Mood normal.         Speech: Speech normal.         Behavior: Behavior normal. Behavior is cooperative.           :          1. Viral URI with cough  -     benzonatate (TESSALON) 200 MG capsule; Take 1 capsule by mouth 3 times daily as needed for Cough, Disp-30 capsule, R-0Normal  -     XR CHEST (2 VW); Future  2. Acute pharyngitis, unspecified etiology  -     dexamethasone (DECADRON) injection 10 mg; 10 mg, IntraMUSCular, ONCE, On Thu 12/2/21 at 1030, For 1 dose  -     lidocaine viscous hcl (XYLOCAINE) 2 % SOLN solution; Take 10 mLs by mouth every 3 hours as needed for Irritation, Disp-100 mL, R-0Normal  -     ibuprofen (ADVIL;MOTRIN) 600 MG tablet; Take 1 tablet by mouth every 6 hours as needed for Pain, Disp-120 tablet, R-0Normal  3. Sore throat  -     POCT rapid strep A  -     lidocaine viscous hcl (XYLOCAINE) 2 % SOLN solution;  Take 10 mLs by mouth every 3 hours as needed for Irritation, Disp-100 mL, R-0Normal       :      Return if symptoms worsen or fail to improve. Orders Placed This Encounter   Medications    dexamethasone (DECADRON) injection 10 mg    lidocaine viscous hcl (XYLOCAINE) 2 % SOLN solution     Sig: Take 10 mLs by mouth every 3 hours as needed for Irritation     Dispense:  100 mL     Refill:  0    benzonatate (TESSALON) 200 MG capsule     Sig: Take 1 capsule by mouth 3 times daily as needed for Cough     Dispense:  30 capsule     Refill:  0    ibuprofen (ADVIL;MOTRIN) 600 MG tablet     Sig: Take 1 tablet by mouth every 6 hours as needed for Pain     Dispense:  120 tablet     Refill:  0     Results for POC orders placed in visit on 12/02/21   POCT rapid strep A   Result Value Ref Range    Strep A Ag None Detected None Detected     Rapid strep performed in office and results reviewed with the patient. Patient tested positive for COVID-19 on 10/31/2021. Advised that these may be prolonged symptoms, more specifically the dry cough and explained COVID-19 long hauler. IM injection of Decadron given in office, patient tolerated. Instructed to closely monitor her blood sugar levels. Last A1C on 10/14 was 6.7. Imaging ordered, advised to have completed in 1-2 weeks if the cough is persistent. Use cough suppressant as needed. May use Motrin as needed for pain/discomfort and viscous lidocaine for throat irritation. Recommend throat lozenges or tea with honey. Follow-up with PCP as needed. Patient and/or parent given educational materials - see patient instructions. Discussed use, benefit, and side effects of prescribed medications. All patient questions answered. Patient and/or parent voiced understanding.       Electronically signed by RADHIKA Adams 12/2/2021 at 10:13 AM

## 2021-12-20 LAB
ALBUMIN SERPL-MCNC: 4 G/DL
ALP BLD-CCNC: 66 U/L
ALT SERPL-CCNC: 27 U/L
AST SERPL-CCNC: 31 U/L
BILIRUB SERPL-MCNC: 0.9 MG/DL (ref 0.1–1.4)
BUN BLDV-MCNC: 11 MG/DL
CALCIUM SERPL-MCNC: 10 MG/DL
CHLORIDE BLD-SCNC: 99 MMOL/L
CHOLESTEROL, FASTING: 222
CO2: 31 MMOL/L
CREAT SERPL-MCNC: 0.88 MG/DL
CREATININE URINE: 259.3 MG/DL
GLUCOSE FASTING: 117 MG/DL
HDLC SERPL-MCNC: 70 MG/DL (ref 35–70)
LDL CHOLESTEROL CALCULATED: 127 MG/DL (ref 0–160)
MICROALBUMIN/CREAT 24H UR: 1.79 MG/G{CREAT}
POTASSIUM SERPL-SCNC: 3.6 MMOL/L
SODIUM BLD-SCNC: 138 MMOL/L
TOTAL PROTEIN: 6.9 G/DL (ref 6.4–8.2)
TRIGLYCERIDE, FASTING: 124

## 2021-12-27 ENCOUNTER — TELEPHONE (OUTPATIENT)
Dept: PRIMARY CARE CLINIC | Age: 71
End: 2021-12-27

## 2021-12-27 DIAGNOSIS — J06.9 VIRAL URI WITH COUGH: ICD-10-CM

## 2021-12-29 RX ORDER — BENZONATATE 200 MG/1
200 CAPSULE ORAL 3 TIMES DAILY PRN
Qty: 30 CAPSULE | Refills: 0 | Status: SHIPPED | OUTPATIENT
Start: 2021-12-29 | End: 2022-04-14 | Stop reason: SDUPTHER

## 2021-12-31 DIAGNOSIS — E11.9 TYPE 2 DIABETES MELLITUS WITHOUT COMPLICATION, WITHOUT LONG-TERM CURRENT USE OF INSULIN (HCC): ICD-10-CM

## 2021-12-31 DIAGNOSIS — Z13.220 SCREENING FOR HYPERLIPIDEMIA: ICD-10-CM

## 2022-01-06 DIAGNOSIS — E11.9 TYPE 2 DIABETES MELLITUS WITHOUT COMPLICATION, WITHOUT LONG-TERM CURRENT USE OF INSULIN (HCC): ICD-10-CM

## 2022-01-06 RX ORDER — LANCETS 30 GAUGE
EACH MISCELLANEOUS
Qty: 100 EACH | Refills: 5 | Status: CANCELLED | OUTPATIENT
Start: 2022-01-06

## 2022-01-06 NOTE — TELEPHONE ENCOUNTER
----- Message from Susijefe Jose sent at 1/6/2022 10:52 AM EST -----  Subject: Refill Request    QUESTIONS  Name of Medication? Lancets MISC  Patient-reported dosage and instructions? TEST STRIPS  How many days do you have left? 0  Preferred Pharmacy? [address]  Pharmacy phone number (if available)? [phone]  ---------------------------------------------------------------------------  --------------,  Name of Medication? blood glucose monitor strips  Patient-reported dosage and instructions? TEST STRIPS  How many days do you have left? 0  Preferred Pharmacy? [address]  Pharmacy phone number (if available)? [phone]  ---------------------------------------------------------------------------  --------------  CALL BACK INFO  What is the best way for the office to contact you? OK to leave message on   voicemail  Preferred Call Back Phone Number?  [phone]

## 2022-04-14 ENCOUNTER — TELEPHONE (OUTPATIENT)
Dept: PRIMARY CARE CLINIC | Age: 72
End: 2022-04-14

## 2022-04-14 ENCOUNTER — OFFICE VISIT (OUTPATIENT)
Dept: PRIMARY CARE CLINIC | Age: 72
End: 2022-04-14
Payer: COMMERCIAL

## 2022-04-14 VITALS
DIASTOLIC BLOOD PRESSURE: 96 MMHG | WEIGHT: 143 LBS | TEMPERATURE: 97.2 F | BODY MASS INDEX: 25.34 KG/M2 | HEART RATE: 61 BPM | SYSTOLIC BLOOD PRESSURE: 162 MMHG | HEIGHT: 63 IN | OXYGEN SATURATION: 99 %

## 2022-04-14 DIAGNOSIS — J06.9 VIRAL URI WITH COUGH: ICD-10-CM

## 2022-04-14 DIAGNOSIS — I10 ESSENTIAL HYPERTENSION: ICD-10-CM

## 2022-04-14 DIAGNOSIS — E11.9 TYPE 2 DIABETES MELLITUS WITHOUT COMPLICATION, WITHOUT LONG-TERM CURRENT USE OF INSULIN (HCC): ICD-10-CM

## 2022-04-14 DIAGNOSIS — M05.79 RHEUMATOID ARTHRITIS INVOLVING MULTIPLE SITES WITH POSITIVE RHEUMATOID FACTOR (HCC): ICD-10-CM

## 2022-04-14 DIAGNOSIS — D32.0 BENIGN NEOPLASM OF CEREBRAL MENINGES (HCC): ICD-10-CM

## 2022-04-14 DIAGNOSIS — E11.9 TYPE 2 DIABETES MELLITUS WITHOUT COMPLICATION, WITHOUT LONG-TERM CURRENT USE OF INSULIN (HCC): Primary | ICD-10-CM

## 2022-04-14 LAB — HBA1C MFR BLD: 7.1 %

## 2022-04-14 PROCEDURE — 83036 HEMOGLOBIN GLYCOSYLATED A1C: CPT | Performed by: NURSE PRACTITIONER

## 2022-04-14 PROCEDURE — 99213 OFFICE O/P EST LOW 20 MIN: CPT | Performed by: NURSE PRACTITIONER

## 2022-04-14 PROCEDURE — 3051F HG A1C>EQUAL 7.0%<8.0%: CPT | Performed by: NURSE PRACTITIONER

## 2022-04-14 RX ORDER — BENZONATATE 200 MG/1
200 CAPSULE ORAL 3 TIMES DAILY PRN
Qty: 30 CAPSULE | Refills: 1 | Status: SHIPPED | OUTPATIENT
Start: 2022-04-14 | End: 2022-05-04 | Stop reason: SDUPTHER

## 2022-04-14 RX ORDER — LANCETS 30 GAUGE
1 EACH MISCELLANEOUS DAILY
Qty: 300 EACH | Refills: 1 | Status: SHIPPED | OUTPATIENT
Start: 2022-04-14 | End: 2022-04-15 | Stop reason: SDUPTHER

## 2022-04-14 RX ORDER — LANCETS 30 GAUGE
1 EACH MISCELLANEOUS DAILY
Qty: 300 EACH | Refills: 1 | OUTPATIENT
Start: 2022-04-14

## 2022-04-14 RX ORDER — GLUCOSAMINE HCL/CHONDROITIN SU 500-400 MG
CAPSULE ORAL
Qty: 100 STRIP | Refills: 5 | Status: SHIPPED | OUTPATIENT
Start: 2022-04-14 | End: 2022-04-15 | Stop reason: SDUPTHER

## 2022-04-14 SDOH — ECONOMIC STABILITY: FOOD INSECURITY: WITHIN THE PAST 12 MONTHS, THE FOOD YOU BOUGHT JUST DIDN'T LAST AND YOU DIDN'T HAVE MONEY TO GET MORE.: NEVER TRUE

## 2022-04-14 SDOH — ECONOMIC STABILITY: FOOD INSECURITY: WITHIN THE PAST 12 MONTHS, YOU WORRIED THAT YOUR FOOD WOULD RUN OUT BEFORE YOU GOT MONEY TO BUY MORE.: NEVER TRUE

## 2022-04-14 ASSESSMENT — PATIENT HEALTH QUESTIONNAIRE - PHQ9
SUM OF ALL RESPONSES TO PHQ9 QUESTIONS 1 & 2: 0
SUM OF ALL RESPONSES TO PHQ QUESTIONS 1-9: 0
1. LITTLE INTEREST OR PLEASURE IN DOING THINGS: 0
2. FEELING DOWN, DEPRESSED OR HOPELESS: 0

## 2022-04-14 ASSESSMENT — ENCOUNTER SYMPTOMS
SHORTNESS OF BREATH: 0
ABDOMINAL PAIN: 0
VOMITING: 0
COUGH: 0
SORE THROAT: 0

## 2022-04-14 ASSESSMENT — SOCIAL DETERMINANTS OF HEALTH (SDOH): HOW HARD IS IT FOR YOU TO PAY FOR THE VERY BASICS LIKE FOOD, HOUSING, MEDICAL CARE, AND HEATING?: NOT HARD AT ALL

## 2022-04-14 NOTE — TELEPHONE ENCOUNTER
Lancets and strips refilled during visit was sent to [facility]. [facility]

## 2022-04-14 NOTE — PROGRESS NOTES
[facility]  [address] [facility] [address]  Dept: [phone]  Dept Fax: [phone]    Patient Care Team:  RADHIKA Beach CNP as PCP - General (Family Medicine)  RADHIKA Beach CNP as PCP - [facility] Empaneled Provider  RADHIKA Ojeda CNP as Consulting Physician (Rheumatology)  Kingston Paget, MD as Consulting Physician (Cardiology)    2022     Flower Vanegas (:  1950)is a 71 y.o. female, here for evaluation of the following medical concerns:   Chief Complaint   Patient presents with    Diabetes     6M F/U    Medication Refill       Last BP at cardiology was 124/74  Does check BP at home on occasion, can run around 140/70   Admits BP is high as she had an argument with her spouse right before coming in, plans to go see her brother in law. Sister  1 year ago and she has not seen tem in 1 year    . Review of Systems   Constitutional: Negative for chills, diaphoresis, fatigue and fever. HENT: Negative for congestion and sore throat. Respiratory: Negative for cough and shortness of breath. Cardiovascular: Negative for chest pain. Gastrointestinal: Negative for abdominal pain and vomiting. Genitourinary: Negative for dysuria and hematuria. Musculoskeletal: Negative for myalgias and neck pain. Skin: Negative for rash. Neurological: Negative for weakness and headaches. Prior to Visit Medications    Medication Sig Taking?  Authorizing Provider   blood glucose monitor strips Provide insurance covered test strips compatible with glucometer, test 1 times a day Yes RADHIKA Beach CNP   Lancets MISC 1 each by Does not apply route daily Yes RADHIKA Beach CNP   benzonatate (TESSALON) 200 MG capsule Take 1 capsule by mouth 3 times daily as needed for Cough Yes RADIHKA Beach CNP   blood glucose monitor kit and supplies Dispense sufficient amount for indicated testing frequency plus additional to accommodate PRN testing needs. Dispense all needed supplies to include: monitor, strips, lancing device, lancets, control solutions, alcohol swabs. Yes RADHIKA Alanis CNP   ibuprofen (ADVIL;MOTRIN) 600 MG tablet TAKE ONE TABLET BY MOUTH EVERY 6 HOURS AS NEEDED FOR PAIN Yes RADHIKA Alanis CNP   lidocaine viscous hcl (XYLOCAINE) 2 % SOLN solution Take 10 mLs by mouth every 3 hours as needed for Irritation Yes RADHIKA Patel CNP   ondansetron (ZOFRAN ODT) 4 MG disintegrating tablet Take 1 tablet by mouth every 8 hours as needed for Nausea or Vomiting Yes Alana KeshiaRADHIKA matos CNP   acetaminophen (TYLENOL) 500 MG tablet Take 1-2 tabs q4-6h prn fever, pain. Do not exceed 3g daily. Yes RADHIKA Lewis CNP   lisinopril (PRINIVIL;ZESTRIL) 40 MG tablet  Yes Historical Provider, MD   metoprolol succinate (TOPROL XL) 25 MG extended release tablet  Yes Historical Provider, MD   atorvastatin (LIPITOR) 10 MG tablet Take 1 tablet by mouth daily Yes RADHIKA Alanis CNP   metFORMIN (GLUCOPHAGE) 500 MG tablet TAKE 1 TABLET TWICE A DAY WITH MEALS Yes RADHIKA Alanis CNP   hydroCHLOROthiazide (HYDRODIURIL) 12.5 MG tablet  Yes Historical Provider, MD   MAGNESIUM PO Take 800 mg by mouth Yes Historical Provider, MD   folic acid (FOLVITE) 1 MG tablet Take 1 tablet by mouth daily Yes Kaleigh Armenta PA-C   OMEGA-3 FATTY ACIDS-VITAMIN E PO Take 1,000 mg by mouth Yes Historical Provider, MD   potassium chloride (KLOR-CON) 10 MEQ extended release tablet Take by mouth Yes Historical Provider, MD   acyclovir (ZOVIRAX) 400 MG tablet Take 400 mg by mouth Yes Historical Provider, MD   methotrexate (RHEUMATREX) 2.5 MG chemo tablet  Yes Historical Provider, MD   predniSONE (DELTASONE) 5 MG tablet Take 5 mg by mouth daily.    Yes Historical Provider, MD        Social History     Tobacco Use    Smoking status: Never Smoker    Smokeless tobacco: Never Used   Substance Use Topics    Alcohol use: No        Vitals:    04/14/22 1009 04/14/22 1103   BP: (!) 170/90 (!) 162/96   Site: Left Upper Arm    Position: Sitting    Pulse: 62 61   Temp: 97.2 °F (36.2 °C)    TempSrc: Temporal    SpO2: 99%    Weight: 143 lb (64.9 kg)    Height: 5' 3\" (1.6 m)      Estimated body mass index is 25.33 kg/m² as calculated from the following:    Height as of this encounter: 5' 3\" (1.6 m). Weight as of this encounter: 143 lb (64.9 kg). DIAGNOSTIC FINDINGS:  CBC:  Lab Results   Component Value Date    WBC 4.3 10/31/2021    HGB 14.6 10/31/2021     10/31/2021       BMP:    Lab Results   Component Value Date     12/20/2021    K 3.6 12/20/2021    CL 99 12/20/2021    CO2 31 12/20/2021    BUN 11 12/20/2021    CREATININE 0.88 12/20/2021    GLUCOSE 192 10/31/2021       HEMOGLOBIN A1C:   Lab Results   Component Value Date    LABA1C 7.1 04/14/2022       FASTING LIPID PANEL:  Lab Results   Component Value Date    CHOL 180 10/12/2020    HDL 70 12/20/2021    TRIG 151 10/12/2020       Physical Exam  Vitals and nursing note reviewed. Constitutional:       General: She is not in acute distress. Appearance: She is well-developed. HENT:      Head: Normocephalic. Eyes:      General: No scleral icterus. Pupils: Pupils are equal, round, and reactive to light. Cardiovascular:      Rate and Rhythm: Normal rate and regular rhythm. Pulmonary:      Effort: Pulmonary effort is normal.      Breath sounds: Normal breath sounds. Abdominal:      Palpations: Abdomen is soft. Musculoskeletal:      Cervical back: Normal range of motion and neck supple. Skin:     General: Skin is warm and dry. Neurological:      Mental Status: She is alert and oriented to person, place, and time. Coordination: Coordination normal.   Psychiatric:         Behavior: Behavior normal. Behavior is cooperative. Thought Content:  Thought content normal.         Judgment: Judgment normal.         ASSESSMENT     Diagnosis Orders   1. Type 2 diabetes mellitus without complication, without long-term current use of insulin (HCC)  POCT glycosylated hemoglobin (Hb A1C)    blood glucose monitor strips    Lancets MISC   2. Essential hypertension     3. Rheumatoid arthritis involving multiple sites with positive rheumatoid factor ([address]     4. Benign neoplasm of cerebral meninges (HCC)     5. Viral URI with cough  benzonatate (TESSALON) 200 MG capsule          PLAN:  Orders Placed This Encounter   Medications    blood glucose monitor strips     Sig: Provide insurance covered test strips compatible with glucometer, test 1 times a day     Dispense:  100 strip     Refill:  5    Lancets MISC     Si each by Does not apply route daily     Dispense:  300 each     Refill:  1    benzonatate (TESSALON) 200 MG capsule     Sig: Take 1 capsule by mouth 3 times daily as needed for Cough     Dispense:  30 capsule     Refill:  1         1. A1c mildly elevated today at 7.1, patient tends to float around this range. No changes made today. 2. Follow with cardiology for resistant hypertension, reports readings at cardiology have been at goal.  Monitor at this time as patient does not desire to adjust medication today. 3. Discussed pneumonia vaccination, patient agreeable wishes to defer today as she is traveling for the holiday weekend and is afraid of potential side effects    FOLLOW UP AND INSTRUCTIONS:  Return in about 4 months (around 2022) for Diabetes, HTN. · Freida received counseling on the following healthy behaviors:nutrition and medication adherence    · Discussed use, benefit, and side effects of prescribed medications. Barriers to  medication compliance addressed. All patient questions answered. Pt  verbalized understanding of all instructions given.     · Patient given educational materials - see patient instructions      · Patient advised to contact scheduling offices for any referrals or imaging orders  placed today if they have not been contacted in 48 hours. Return in about 4 months (around 8/14/2022) for Diabetes, HTN. An electronic signature was used to authenticate this note. --RADHIKA Peraza CNP on 4/14/2022 at 12:30 PM  Visit Information    Have you changed or started any medications since your last visit including any over-the-counter medicines, vitamins, or herbal medicines? no   Are you having any side effects from any of your medications? -  no  Have you stopped taking any of your medications? Is so, why? -  no    Have you seen any other physician or provider since your last visit? No  Have you had any other diagnostic tests since your last visit? No  Have you been seen in the emergency room and/or had an admission to a hospital since we last saw you? No  Have you had your routine dental cleaning in the past 6 months? no    Have you activated your [employer] account? If not, what are your barriers?  Yes     Patient Care Team:  RADHIKA Peraza CNP as PCP - General (Family Medicine)  RADHIKA Peraza CNP as PCP - [facility]  Cathlean Cowden, APRN - CNP as Consulting Physician (Rheumatology)  Torin Barber MD as Consulting Physician (Cardiology)    Medical History Review  Past Medical, Family, and Social History reviewed and does not contribute to the patient presenting condition    Health Maintenance   Topic Date Due    COVID-19 Vaccine (1) Never done    Pneumococcal 65+ years Vaccine (1 - PCV) Never done    DTaP/Tdap/Td vaccine (1 - Tdap) Never done    Shingles Vaccine (1 of 2) Never done    Diabetic foot exam  10/12/2021    Annual Wellness Visit (AWV)  03/13/2022    Breast cancer screen  06/03/2022    Flu vaccine (Season Ended) 09/01/2022    Diabetic retinal exam  10/29/2022    Depression Screen  11/11/2022    Diabetic microalbuminuria test  12/20/2022    Lipid screen  12/20/2022    Potassium monitoring 12/20/2022    Creatinine monitoring  12/20/2022    A1C test (Diabetic or Prediabetic)  04/14/2023    Colorectal Cancer Screen  08/27/2029    DEXA (modify frequency per FRAX score)  Addressed    Hepatitis C screen  Addressed    Hepatitis A vaccine  Aged Out    Hib vaccine  Aged Out    Meningococcal (ACWY) vaccine  Aged Out

## 2022-04-15 RX ORDER — GLUCOSAMINE HCL/CHONDROITIN SU 500-400 MG
CAPSULE ORAL
Qty: 100 STRIP | Refills: 5 | Status: SHIPPED | OUTPATIENT
Start: 2022-04-15

## 2022-04-15 RX ORDER — LANCETS 30 GAUGE
1 EACH MISCELLANEOUS DAILY
Qty: 300 EACH | Refills: 1 | Status: SHIPPED | OUTPATIENT
Start: 2022-04-15

## 2022-05-03 ENCOUNTER — OFFICE VISIT (OUTPATIENT)
Dept: PRIMARY CARE CLINIC | Age: 72
End: 2022-05-03
Payer: COMMERCIAL

## 2022-05-03 ENCOUNTER — HOSPITAL ENCOUNTER (OUTPATIENT)
Age: 72
Setting detail: SPECIMEN
Discharge: HOME | End: 2022-05-03

## 2022-05-03 ENCOUNTER — TELEPHONE (OUTPATIENT)
Dept: PRIMARY CARE CLINIC | Age: 72
End: 2022-05-03

## 2022-05-03 VITALS
HEART RATE: 64 BPM | TEMPERATURE: 97 F | DIASTOLIC BLOOD PRESSURE: 99 MMHG | SYSTOLIC BLOOD PRESSURE: 198 MMHG | OXYGEN SATURATION: 96 %

## 2022-05-03 DIAGNOSIS — J06.9 VIRAL URI WITH COUGH: ICD-10-CM

## 2022-05-03 DIAGNOSIS — R68.89 FLU-LIKE SYMPTOMS: Primary | ICD-10-CM

## 2022-05-03 PROCEDURE — 99213 OFFICE O/P EST LOW 20 MIN: CPT | Performed by: INTERNAL MEDICINE

## 2022-05-03 ASSESSMENT — ENCOUNTER SYMPTOMS
FLU SYMPTOMS: 1
COUGH: 1

## 2022-05-03 NOTE — PROGRESS NOTES
[facility] [place] [facility]  [address] [place] [id number]  Dept: [phone]  Dept Fax: [phone]    Anamika Herbert is a 71 y.o. female who presents to the urgent care today for her medicalconditions/complaints as noted below. Anamika Herbert is c/o of Cough (all symptoms for a week ) and Nasal Congestion      HPI:     Influenza  This is a new problem. The current episode started in the past 7 days. The problem occurs constantly. The problem has been unchanged. Associated symptoms include congestion and coughing. Nothing aggravates the symptoms. She has tried nothing for the symptoms. The treatment provided no relief. Had [employer] in October 2021. No known exposure to COVID. Not vaccinated. Past Medical History:   Diagnosis Date    DJD (degenerative joint disease) of cervical spine     Hyperlipidemia     Hypertension     Obesity     Sleep disorder     Type 2 diabetes mellitus without complication, without long-term current use of insulin ([facility] [address] 9/29/2016        Current Outpatient Medications   Medication Sig Dispense Refill    Lancets MISC 1 each by Does not apply route daily 300 each 1    blood glucose monitor strips Provide insurance covered test strips compatible with glucometer, test 1 times a day 100 strip 5    blood glucose monitor kit and supplies Dispense sufficient amount for indicated testing frequency plus additional to accommodate PRN testing needs. Dispense all needed supplies to include: monitor, strips, lancing device, lancets, control solutions, alcohol swabs.  1 kit 0    ibuprofen (ADVIL;MOTRIN) 600 MG tablet TAKE ONE TABLET BY MOUTH EVERY 6 HOURS AS NEEDED FOR PAIN 120 tablet 1    lidocaine viscous hcl (XYLOCAINE) 2 % SOLN solution Take 10 mLs by mouth every 3 hours as needed for Irritation 100 mL 0    ondansetron (ZOFRAN ODT) 4 MG disintegrating tablet Take 1 tablet by mouth every 8 hours as needed for Nausea or Vomiting 20 tablet 0    acetaminophen (TYLENOL) 500 MG tablet Take 1-2 tabs q4-6h prn fever, pain. Do not exceed 3g daily. 30 tablet 0    lisinopril (PRINIVIL;ZESTRIL) 40 MG tablet       metoprolol succinate (TOPROL XL) 25 MG extended release tablet       atorvastatin (LIPITOR) 10 MG tablet Take 1 tablet by mouth daily 90 tablet 1    metFORMIN (GLUCOPHAGE) 500 MG tablet TAKE 1 TABLET TWICE A DAY WITH MEALS 180 tablet 3    hydroCHLOROthiazide (HYDRODIURIL) 12.5 MG tablet       MAGNESIUM PO Take 800 mg by mouth      folic acid (FOLVITE) 1 MG tablet Take 1 tablet by mouth daily 90 tablet 1    OMEGA-3 FATTY ACIDS-VITAMIN E PO Take 1,000 mg by mouth      potassium chloride (KLOR-CON) 10 MEQ extended release tablet Take by mouth      acyclovir (ZOVIRAX) 400 MG tablet Take 400 mg by mouth      methotrexate (RHEUMATREX) 2.5 MG chemo tablet       predniSONE (DELTASONE) 5 MG tablet Take 5 mg by mouth daily. No current facility-administered medications for this visit.      Allergies   Allergen Reactions    Amoxicillin-Pot Clavulanate     Benadryl [Diphenhydramine Hcl] Rash       Health Maintenance   Topic Date Due    COVID-19 Vaccine (1) Never done    Pneumococcal 65+ years Vaccine (1 - PCV) Never done    DTaP/Tdap/Td vaccine (1 - Tdap) Never done    Shingles vaccine (1 of 2) Never done    Diabetic foot exam  10/12/2021    Breast cancer screen  06/03/2022    Flu vaccine (Season Ended) 09/01/2022    Diabetic retinal exam  10/29/2022    Diabetic microalbuminuria test  12/20/2022    Lipids  12/20/2022    Potassium  12/20/2022    Creatinine  12/20/2022    A1C test (Diabetic or Prediabetic)  04/14/2023    Depression Screen  04/14/2023    Colorectal Cancer Screen  08/27/2029    DEXA (modify frequency per FRAX score)  Addressed    Hepatitis C screen  Addressed    Hepatitis A vaccine  Aged Out    Hib vaccine  Aged Out    Meningococcal (ACWY) vaccine  Aged Out       Subjective:      Review of Systems   HENT: Positive for congestion. Respiratory: Positive for cough. All other systems reviewed and are negative. Objective:     Physical Exam  Vitals reviewed. Constitutional:       Appearance: Normal appearance. HENT:      Head: Normocephalic and atraumatic. Skin:     General: Skin is warm and dry. Neurological:      General: No focal deficit present. Mental Status: She is alert and oriented to person, place, and time. Psychiatric:         Mood and Affect: Mood normal.         Behavior: Behavior normal.       BP (!) 198/99   Pulse 64   Temp 97 °F (36.1 °C) (Temporal)   LMP  (LMP Unknown)   SpO2 96%       Assessment:       Diagnosis Orders   1. Flu-like symptoms  COVID-19       Plan:      No follow-ups on file. No orders of the defined types were placed in this encounter. Orders Placed This Encounter   Procedures    COVID-19     Standing Status:   Future     Standing Expiration Date:   5/3/2023     Scheduling Instructions:      1) Due to current limited availability of the COVID-19 test, tests will be prioritized based on responses to questions above. Testing may be delayed due to volume. 2) Print and instruct patient to adhere to CDC home isolation program. (Link Above)              3) Set up or refer patient for a monitoring program.              4) Have patient sign up for and leverage MyChart (if not previously done). Order Specific Question:   Is this test for diagnosis or screening? Answer:   Diagnosis of ill patient     Order Specific Question:   Symptomatic for COVID-19 as defined by CDC? Answer:   Yes     Order Specific Question:   Date of Symptom Onset     Answer:   4/26/2022     Order Specific Question:   Hospitalized for COVID-19? Answer:   No     Order Specific Question:   Admitted to ICU for COVID-19? Answer:   No     Order Specific Question:   Employed in healthcare setting?      Answer:   No     Order Specific Question: Resident in a congregate (group) care setting? Answer:   No     Order Specific Question:   Pregnant? Answer:   No            Patient given educational materials - see patient instructions. Discussed use, benefit, and side effects of prescribed medications. All patientquestions answered. Pt voiced understanding.     Electronically signed by Aime Coon MD on 5/3/2022at 9:23 AM

## 2022-05-03 NOTE — TELEPHONE ENCOUNTER
Pt came to walk in clinic and was not pleased that walk in doctor did not prescribe benzonatate (TESSALON) capsule 200 mg   or her cough. Pt wanted to see if PCP could prescribe this for her. Pt. Wanted to let PCP know that she is also not pleased that walk in DrSarmad did not check her heart or lungs.

## 2022-05-04 ENCOUNTER — HOSPITAL ENCOUNTER (EMERGENCY)
Age: 72
Discharge: HOME | End: 2022-05-04
Attending: EMERGENCY MEDICINE
Payer: MEDICARE

## 2022-05-04 ENCOUNTER — APPOINTMENT (OUTPATIENT)
Dept: GENERAL RADIOLOGY | Age: 72
End: 2022-05-04
Payer: MEDICARE

## 2022-05-04 VITALS
BODY MASS INDEX: 26.75 KG/M2 | HEIGHT: 63 IN | RESPIRATION RATE: 14 BRPM | TEMPERATURE: 100.1 F | DIASTOLIC BLOOD PRESSURE: 84 MMHG | OXYGEN SATURATION: 98 % | WEIGHT: 151 LBS | SYSTOLIC BLOOD PRESSURE: 192 MMHG | HEART RATE: 77 BPM

## 2022-05-04 DIAGNOSIS — J06.9 ACUTE UPPER RESPIRATORY INFECTION: Primary | ICD-10-CM

## 2022-05-04 DIAGNOSIS — R68.89 FLU-LIKE SYMPTOMS: ICD-10-CM

## 2022-05-04 LAB
FLU A ANTIGEN: NEGATIVE
FLU B ANTIGEN: NEGATIVE
S PYO AG THROAT QL: NEGATIVE
SARS-COV-2, RAPID: NOT DETECTED
SOURCE: NORMAL
SPECIMEN DESCRIPTION: NORMAL

## 2022-05-04 PROCEDURE — 87880 STREP A ASSAY W/OPTIC: CPT

## 2022-05-04 PROCEDURE — 99284 EMERGENCY DEPT VISIT MOD MDM: CPT

## 2022-05-04 PROCEDURE — 71045 X-RAY EXAM CHEST 1 VIEW: CPT

## 2022-05-04 PROCEDURE — 87804 INFLUENZA ASSAY W/OPTIC: CPT

## 2022-05-04 PROCEDURE — 87635 SARS-COV-2 COVID-19 AMP PRB: CPT

## 2022-05-04 RX ORDER — FLUTICASONE PROPIONATE 50 MCG
1 SPRAY, SUSPENSION (ML) NASAL DAILY
Qty: 16 G | Refills: 0 | Status: SHIPPED | OUTPATIENT
Start: 2022-05-04

## 2022-05-04 RX ORDER — BENZONATATE 200 MG/1
200 CAPSULE ORAL 3 TIMES DAILY PRN
Qty: 30 CAPSULE | Refills: 1 | Status: SHIPPED | OUTPATIENT
Start: 2022-05-04 | End: 2022-05-11

## 2022-05-04 RX ORDER — DEXTROMETHORPHAN HYDROBROMIDE AND PROMETHAZINE HYDROCHLORIDE 15; 6.25 MG/5ML; MG/5ML
5 SYRUP ORAL 4 TIMES DAILY PRN
Qty: 150 ML | Refills: 0 | Status: SHIPPED | OUTPATIENT
Start: 2022-05-04 | End: 2022-05-11

## 2022-05-04 ASSESSMENT — ENCOUNTER SYMPTOMS
COUGH: 1
RHINORRHEA: 1
SORE THROAT: 1

## 2022-05-04 NOTE — ED PROVIDER NOTES
[address] [place] [address] ED  eMERGENCY dEPARTMENT eNCOUnter      Pt Name: Gail Mcarthur  MRN: [id number]  [place] 1950  Date of evaluation: 5/4/22      CHIEF COMPLAINT       Chief Complaint   Patient presents with    Cough    Concern For COVID-19         HISTORY OF PRESENT ILLNESS    Gail Mcarthur is a 71 y.o. female who presents complaining of cough congestion runny nose. Was seen primary care provider office yesterday. The history is provided by the patient. URI  Presenting symptoms: congestion, cough, rhinorrhea and sore throat    Severity:  Moderate  Onset quality:  Gradual  Duration:  1 week  Timing:  Intermittent  Progression:  Waxing and waning  Chronicity:  New  Relieved by:  Nothing  Worsened by:  Nothing  Ineffective treatments:  None tried      REVIEW OF SYSTEMS       Review of Systems   HENT: Positive for congestion, rhinorrhea and sore throat. Respiratory: Positive for cough. All other systems reviewed and are negative. PAST MEDICAL HISTORY     Past Medical History:   Diagnosis Date    DJD (degenerative joint disease) of cervical spine     Hyperlipidemia     Hypertension     Obesity     Sleep disorder     Type 2 diabetes mellitus without complication, without long-term current use of insulin ([facility] [address] 9/29/2016       SURGICAL HISTORY       Past Surgical History:   Procedure Laterality Date    HYSTERECTOMY         CURRENT MEDICATIONS       Previous Medications    ACETAMINOPHEN (TYLENOL) 500 MG TABLET    Take 1-2 tabs q4-6h prn fever, pain. Do not exceed 3g daily. ACYCLOVIR (ZOVIRAX) 400 MG TABLET    Take 400 mg by mouth    ATORVASTATIN (LIPITOR) 10 MG TABLET    Take 1 tablet by mouth daily    BENZONATATE (TESSALON) 200 MG CAPSULE    Take 1 capsule by mouth 3 times daily as needed for Cough    BLOOD GLUCOSE MONITOR KIT AND SUPPLIES    Dispense sufficient amount for indicated testing frequency plus additional to accommodate PRN testing needs.  Dispense all needed supplies to include: monitor, strips, lancing device, lancets, control solutions, alcohol swabs. BLOOD GLUCOSE MONITOR STRIPS    Provide insurance covered test strips compatible with glucometer, test 1 times a day    FOLIC ACID (FOLVITE) 1 MG TABLET    Take 1 tablet by mouth daily    HYDROCHLOROTHIAZIDE (HYDRODIURIL) 12.5 MG TABLET        IBUPROFEN (ADVIL;MOTRIN) 600 MG TABLET    TAKE ONE TABLET BY MOUTH EVERY 6 HOURS AS NEEDED FOR PAIN    LANCETS MISC    1 each by Does not apply route daily    LIDOCAINE VISCOUS HCL (XYLOCAINE) 2 % SOLN SOLUTION    Take 10 mLs by mouth every 3 hours as needed for Irritation    LISINOPRIL (PRINIVIL;ZESTRIL) 40 MG TABLET        MAGNESIUM PO    Take 800 mg by mouth    METFORMIN (GLUCOPHAGE) 500 MG TABLET    TAKE 1 TABLET TWICE A DAY WITH MEALS    METHOTREXATE (RHEUMATREX) 2.5 MG CHEMO TABLET        METOPROLOL SUCCINATE (TOPROL XL) 25 MG EXTENDED RELEASE TABLET        OMEGA-3 FATTY ACIDS-VITAMIN E PO    Take 1,000 mg by mouth    ONDANSETRON (ZOFRAN ODT) 4 MG DISINTEGRATING TABLET    Take 1 tablet by mouth every 8 hours as needed for Nausea or Vomiting    POTASSIUM CHLORIDE (KLOR-CON) 10 MEQ EXTENDED RELEASE TABLET    Take by mouth    PREDNISONE (DELTASONE) 5 MG TABLET    Take 5 mg by mouth daily. ALLERGIES     is allergic to amoxicillin-pot clavulanate and benadryl [diphenhydramine hcl]. FAMILY HISTORY     She indicated that her mother is . She indicated that her father is . She indicated that her sister is . She indicated that her brother is . She indicated that her other is alive. SOCIAL HISTORY      reports that she has never smoked. She has never used smokeless tobacco. She reports that she does not drink alcohol and does not use drugs.     PHYSICAL EXAM     INITIAL VITALS: BP (!) 192/84   Pulse 77   Temp 100.1 °F (37.8 °C) (Oral)   Resp 14   Ht 5' 3\" (1.6 m)   Wt 151 lb (68.5 kg)   LMP  (LMP Unknown)   SpO2 98%   BMI 26.75 kg/m²      Physical Exam  Vitals and nursing note reviewed. Constitutional:       General: She is not in acute distress. Appearance: She is well-developed. She is not diaphoretic. HENT:      Head: Normocephalic. Right Ear: Hearing, tympanic membrane, ear canal and external ear normal.      Left Ear: Hearing, tympanic membrane, ear canal and external ear normal.      Nose: Nose normal.      Mouth/Throat:      Mouth: Mucous membranes are moist.      Pharynx: Uvula midline. No oropharyngeal exudate or posterior oropharyngeal erythema. Tonsils: No tonsillar abscesses. Cardiovascular:      Rate and Rhythm: Normal rate and regular rhythm. Heart sounds: Normal heart sounds. Pulmonary:      Effort: Pulmonary effort is normal. No respiratory distress. Musculoskeletal:         General: No tenderness. Normal range of motion. Cervical back: Normal range of motion. Lymphadenopathy:      Cervical: No cervical adenopathy. Skin:     General: Skin is warm and dry. Neurological:      Mental Status: She is alert. MEDICAL DECISION MAKING:     URI signs or symptoms has temperature 100.1 while here in the emergency department today the strep test was accidentally sent to [facility] per lab. The strep test is pending. Negative COVID-negative flu negative chest x-ray. Likely viral illness. Will recommend Promethazine DM and Flonase cool-mist humidifier in room at night hot tea and honey for cough drink plenty of fluids Tylenol Motrin for any pain or fever and follow-up with primary care in 1 to 2 days return if worse or other concerns    DIAGNOSTIC RESULTS     EKG: All EKG's are interpreted by the Emergency Department Physician who either signs or Co-signs this chart in the absence of acardiologist.        RADIOLOGY:[place] film, CT, MRI, and formal ultrasound images (except ED bedside ultrasound) are read by the radiologist and the images and interpretations are directly viewed by the emergency physician. FINDINGS:   Normal heart size and pulmonary vasculature.  No focal consolidations,   pleural effusions, or pneumothorax.           Impression   Lungs appear clear.               LABS:All lab results were reviewed by myself, and all abnormals are listed below. Labs Reviewed   COVID-19, RAPID   RAPID INFLUENZA A/B ANTIGENS   STREP SCREEN GROUP A THROAT         EMERGENCY DEPARTMENT COURSE:   Vitals:    Vitals:    22 0919 22 0921   BP:  (!) 192/84   Pulse: 77    Resp: 14    Temp: 100.1 °F (37.8 °C)    TempSrc: Oral    SpO2: 98%    Weight: 151 lb (68.5 kg)    Height: 5' 3\" (1.6 m)        The patient was given the following medications while in the emergency department:  Orders Placed This Encounter   Medications    fluticasone (FLONASE) 50 MCG/ACT nasal spray     Si spray by Each Nostril route daily     Dispense:  16 g     Refill:  0    promethazine-dextromethorphan (PROMETHAZINE-DM) 6.25-15 MG/5ML syrup     Sig: Take 5 mLs by mouth 4 times daily as needed for Cough     Dispense:  150 mL     Refill:  0       -------------------------      CRITICAL CARE:       CONSULTS:  None    PROCEDURES:  Procedures    FINAL IMPRESSION      1.  Acute upper respiratory infection          DISPOSITION/PLAN   DISPOSITION        PATIENT REFERREDTO:  RADHIKA Jeronimo - CNP  [address] [address]  [phone]    Schedule an appointment as soon as possible for a visit in 2 days      [facility] ED  [address] [facility]  [phone]    If symptoms worsen      DISCHARGEMEDICATIONS:  New Prescriptions    FLUTICASONE (FLONASE) 50 MCG/ACT NASAL SPRAY    1 spray by Each Nostril route daily    PROMETHAZINE-DEXTROMETHORPHAN (PROMETHAZINE-DM) 6.25-15 MG/5ML SYRUP    Take 5 mLs by mouth 4 times daily as needed for Cough       (Please note that portions of this note were completed with a voice recognition program.  Efforts were made to edit thedictations but occasionally words are mis-transcribed.)    ULYSSES Kessler PA-C  05/04/22 1051

## 2022-05-04 NOTE — TELEPHONE ENCOUNTER
Writer informed patient of pcp message regarding medication. Gave verbal understanding. No questions asked at time of call.

## 2022-05-04 NOTE — ED NOTES
Pt updated that Tessalon Pearls are called in to [facility], pt states , I already have some and they are not working. Kaitlynn RIVAS in the room updating pt on results.       Reyes Sciara, RN  05/04/22 1044

## 2022-05-05 LAB
SARS-COV-2: NORMAL
SARS-COV-2: NOT DETECTED
SOURCE: NORMAL

## 2022-05-05 NOTE — ED PROVIDER NOTES
eMERGENCY dEPARTMENT eNCOUnter   Independent Attestation     Pt Name: Taylor Anand  MRN: [id number]  [place] 1950  Date of evaluation: 5/5/22     Taylor Anand is a 71 y.o. female with CC: Cough and Concern For COVID-19        This visit was performed by both a physician and an APC. I performed all aspects of the MDM as documented. The care is provided during an unprecedented national emergency due to the novel coronavirus, COVID 19.     Hank Mcmahon MD  Attending Emergency Physician          Ami Santillan MD  05/05/22 0888

## 2022-05-16 ENCOUNTER — TELEPHONE (OUTPATIENT)
Dept: PRIMARY CARE CLINIC | Age: 72
End: 2022-05-16

## 2022-05-16 DIAGNOSIS — Z12.31 ENCOUNTER FOR SCREENING MAMMOGRAM FOR MALIGNANT NEOPLASM OF BREAST: Primary | ICD-10-CM

## 2022-05-16 NOTE — TELEPHONE ENCOUNTER
----- Message from Radha Weaver MA sent at 5/16/2022 10:48 AM EDT -----  Subject: Message to Provider    QUESTIONS  Information for Provider? Ольгаo. scheduled for 6/7/22. Needs order faxed   to [facility] [phone].   ---------------------------------------------------------------------------  --------------  [address]  What is the best way for the office to contact you? Do not leave any   message, patient will call back for answer  Preferred Call Back Phone Number? [phone]  ---------------------------------------------------------------------------  --------------  SCRIPT ANSWERS  Relationship to Patient?  Self

## 2022-05-16 NOTE — TELEPHONE ENCOUNTER
----- Message from Demond Decker MA sent at 5/16/2022 10:48 AM EDT -----  Subject: Message to Provider    QUESTIONS  Information for Provider? Lissa. scheduled for 6/7/22. Needs order faxed   to [facility] [phone].   ---------------------------------------------------------------------------  --------------  [address]  What is the best way for the office to contact you? Do not leave any   message, patient will call back for answer  Preferred Call Back Phone Number? [phone]  ---------------------------------------------------------------------------  --------------  SCRIPT ANSWERS  Relationship to Patient?  Self

## 2022-08-15 ENCOUNTER — OFFICE VISIT (OUTPATIENT)
Dept: PRIMARY CARE CLINIC | Age: 72
End: 2022-08-15
Payer: MEDICARE

## 2022-08-15 VITALS
BODY MASS INDEX: 26.75 KG/M2 | OXYGEN SATURATION: 97 % | WEIGHT: 151 LBS | HEART RATE: 58 BPM | DIASTOLIC BLOOD PRESSURE: 93 MMHG | SYSTOLIC BLOOD PRESSURE: 145 MMHG | TEMPERATURE: 97.2 F

## 2022-08-15 DIAGNOSIS — Z76.0 MEDICATION REFILL: ICD-10-CM

## 2022-08-15 DIAGNOSIS — I10 ESSENTIAL HYPERTENSION: ICD-10-CM

## 2022-08-15 DIAGNOSIS — Z28.21 REFUSED PNEUMOCOCCAL VACCINATION: ICD-10-CM

## 2022-08-15 DIAGNOSIS — E11.9 TYPE 2 DIABETES MELLITUS WITHOUT COMPLICATION, WITHOUT LONG-TERM CURRENT USE OF INSULIN (HCC): Primary | ICD-10-CM

## 2022-08-15 LAB — HBA1C MFR BLD: 7 %

## 2022-08-15 PROCEDURE — 99213 OFFICE O/P EST LOW 20 MIN: CPT | Performed by: NURSE PRACTITIONER

## 2022-08-15 PROCEDURE — 3051F HG A1C>EQUAL 7.0%<8.0%: CPT | Performed by: NURSE PRACTITIONER

## 2022-08-15 PROCEDURE — 1123F ACP DISCUSS/DSCN MKR DOCD: CPT | Performed by: NURSE PRACTITIONER

## 2022-08-15 PROCEDURE — 83036 HEMOGLOBIN GLYCOSYLATED A1C: CPT | Performed by: NURSE PRACTITIONER

## 2022-08-15 RX ORDER — ATORVASTATIN CALCIUM 10 MG/1
10 TABLET, FILM COATED ORAL DAILY
Qty: 90 TABLET | Refills: 1 | Status: SHIPPED | OUTPATIENT
Start: 2022-08-15

## 2022-08-15 ASSESSMENT — ENCOUNTER SYMPTOMS
COUGH: 0
SHORTNESS OF BREATH: 0
CHEST TIGHTNESS: 0
BLURRED VISION: 0
ABDOMINAL PAIN: 0
SORE THROAT: 0
VOMITING: 0

## 2022-08-15 NOTE — PROGRESS NOTES
[facility]  [id number] [facility] [address]  Dept: [phone]  Dept Fax: [phone]    Patient Care Team:  RADHIKA Okeefe CNP as PCP - General (Family Medicine)  RADHIKA Okeefe CNP as PCP - [facility] Empaneled Provider  RADHIKA Carvajal CNP as Consulting Physician (Rheumatology)  Dorian Beavers MD as Consulting Physician (Cardiology)    8/15/2022     Eric Burger (:  1950)is a 71 y.o. female, here for evaluation of the following medical concerns:   Chief Complaint   Patient presents with    Diabetes     4M F/U       Follows with cardiology, goes every 6 months    Does check BP at home on occasion, can run around 140/70   Labs every 2 months with Rheumatology    Hypertension  This is a chronic problem. The problem is controlled. Pertinent negatives include no blurred vision, chest pain, headaches, neck pain, peripheral edema or shortness of breath. Risk factors for coronary artery disease include obesity and post-menopausal state. There is no history of retinopathy. Diabetes  She presents for her follow-up diabetic visit. She has type 2 diabetes mellitus. There are no hypoglycemic associated symptoms. Pertinent negatives for hypoglycemia include no headaches. Pertinent negatives for diabetes include no blurred vision, no chest pain, no fatigue, no foot paresthesias, no foot ulcerations and no weakness. Symptoms are stable. Pertinent negatives for diabetic complications include no peripheral neuropathy or retinopathy. Risk factors for coronary artery disease include hypertension and obesity. She is compliant with treatment all of the time. She is following a generally healthy diet. Her breakfast blood glucose range is generally  mg/dl. An ACE inhibitor/angiotensin II receptor blocker is being taken. She does not see a podiatrist.Eye exam is current.    .    Review of Systems Constitutional:  Negative for chills, diaphoresis, fatigue and fever. HENT:  Negative for congestion and sore throat. Eyes:  Negative for blurred vision. Respiratory:  Negative for cough, chest tightness and shortness of breath. Cardiovascular:  Negative for chest pain and leg swelling. Gastrointestinal:  Negative for abdominal pain and vomiting. Genitourinary:  Negative for difficulty urinating, dysuria, hematuria, vaginal bleeding and vaginal discharge. Musculoskeletal:  Negative for myalgias and neck pain. Skin:  Negative for rash and wound. Neurological:  Negative for weakness, numbness and headaches. Prior to Visit Medications    Medication Sig Taking? Authorizing Provider   metFORMIN (GLUCOPHAGE) 500 MG tablet TAKE 1 TABLET TWICE A DAY WITH MEALS Yes RADHIKA Wilcox CNP   atorvastatin (LIPITOR) 10 MG tablet Take 1 tablet by mouth in the morning. Yes RADHIKA Wilcox CNP   Lancets MISC 1 each by Does not apply route daily Yes RADHIKA Wilcox CNP   blood glucose monitor strips Provide insurance covered test strips compatible with glucometer, test 1 times a day Yes RADHIKA Wilcox CNP   blood glucose monitor kit and supplies Dispense sufficient amount for indicated testing frequency plus additional to accommodate PRN testing needs. Dispense all needed supplies to include: monitor, strips, lancing device, lancets, control solutions, alcohol swabs. Yes RADHIKA Wilcox CNP   ibuprofen (ADVIL;MOTRIN) 600 MG tablet TAKE ONE TABLET BY MOUTH EVERY 6 HOURS AS NEEDED FOR PAIN Yes RADHIKA Wilcox CNP   acetaminophen (TYLENOL) 500 MG tablet Take 1-2 tabs q4-6h prn fever, pain. Do not exceed 3g daily.  Yes RADHIKA Briseno CNP   lisinopril (PRINIVIL;ZESTRIL) 40 MG tablet  Yes Historical Provider, MD   metoprolol succinate (TOPROL XL) 25 MG extended release tablet  Yes Historical Provider, MD   hydroCHLOROthiazide (HYDRODIURIL) 12.5 MG tablet  Yes Historical Provider, MD   MAGNESIUM PO Take 800 mg by mouth Yes Historical Provider, MD   folic acid (FOLVITE) 1 MG tablet Take 1 tablet by mouth daily Yes Vale Corral PA-C   OMEGA-3 FATTY ACIDS-VITAMIN E PO Take 1,000 mg by mouth Yes Historical Provider, MD   potassium chloride (KLOR-CON) 10 MEQ extended release tablet Take by mouth Yes Historical Provider, MD   acyclovir (ZOVIRAX) 400 MG tablet Take 400 mg by mouth Yes Historical Provider, MD   methotrexate (RHEUMATREX) 2.5 MG chemo tablet  Yes Historical Provider, MD   predniSONE (DELTASONE) 5 MG tablet Take 5 mg by mouth daily. Yes Historical Provider, MD   fluticasone (FLONASE) 50 MCG/ACT nasal spray 1 spray by Each Nostril route daily  Anatoly Tamayo PA-C        Social History     Tobacco Use    Smoking status: Never    Smokeless tobacco: Never   Substance Use Topics    Alcohol use: No        Vitals:    08/15/22 0943 08/15/22 1023   BP: (!) 172/90 (!) 145/93   Site: Right Upper Arm    Position: Sitting    Pulse: 63 58   Temp: 97.2 °F (36.2 °C)    TempSrc: Infrared    SpO2: 97%    Weight: 151 lb (68.5 kg)      Estimated body mass index is 26.75 kg/m² as calculated from the following:    Height as of 5/4/22: 5' 3\" (1.6 m). Weight as of this encounter: 151 lb (68.5 kg).     DIAGNOSTIC FINDINGS:  CBC:  Lab Results   Component Value Date/Time    WBC 4.3 10/31/2021 01:24 PM    HGB 14.6 10/31/2021 01:24 PM     10/31/2021 01:24 PM       BMP:    Lab Results   Component Value Date/Time     12/20/2021 12:00 AM    K 3.6 12/20/2021 12:00 AM    CL 99 12/20/2021 12:00 AM    CO2 31 12/20/2021 12:00 AM    BUN 11 12/20/2021 12:00 AM    CREATININE 0.88 12/20/2021 12:00 AM    GLUCOSE 192 10/31/2021 01:24 PM       HEMOGLOBIN A1C:   Lab Results   Component Value Date/Time    LABA1C 7.0 08/15/2022 09:50 AM       FASTING LIPID PANEL:  Lab Results   Component Value Date    CHOL 180 10/12/2020    HDL 70 12/20/2021    TRIG 151 10/12/2020       Physical Exam  Vitals and nursing note reviewed. Constitutional:       General: She is not in acute distress. Appearance: She is well-developed. HENT:      Head: Normocephalic. Eyes:      General: No scleral icterus. Pupils: Pupils are equal, round, and reactive to light. Cardiovascular:      Rate and Rhythm: Normal rate and regular rhythm. Pulmonary:      Effort: Pulmonary effort is normal.      Breath sounds: Normal breath sounds. Abdominal:      Palpations: Abdomen is soft. Musculoskeletal:      Cervical back: Normal range of motion and neck supple. Skin:     General: Skin is warm and dry. Neurological:      Mental Status: She is alert and oriented to person, place, and time. Coordination: Coordination normal.   Psychiatric:         Behavior: Behavior normal. Behavior is cooperative. Thought Content: Thought content normal.         Judgment: Judgment normal.       ASSESSMENT     Diagnosis Orders   1. Type 2 diabetes mellitus without complication, without long-term current use of insulin ([facility])  POCT glycosylated hemoglobin (Hb A1C)    metFORMIN (GLUCOPHAGE) 500 MG tablet    Comprehensive Metabolic Panel    HM DIABETES FOOT EXAM    atorvastatin (LIPITOR) 10 MG tablet      2. Essential hypertension        3. Medication refill  metFORMIN (GLUCOPHAGE) 500 MG tablet    atorvastatin (LIPITOR) 10 MG tablet      4. Refused pneumococcal vaccination               PLAN:  Orders Placed This Encounter   Medications    metFORMIN (GLUCOPHAGE) 500 MG tablet     Sig: TAKE 1 TABLET TWICE A DAY WITH MEALS     Dispense:  180 tablet     Refill:  3    atorvastatin (LIPITOR) 10 MG tablet     Sig: Take 1 tablet by mouth in the morning. Dispense:  90 tablet     Refill:  1         Blood pressure in office borderline, patient reports controlled readings at home.   A1c at 7, continue current treatment  Health maintenance reviewed, patient did decline pneumococcal and shingles vaccine today    FOLLOW UP AND INSTRUCTIONS:  Return in about 6 months (around 2/15/2023). Freida received counseling on the following healthy behaviors:nutrition and medication adherence    Discussed use, benefit, and side effects of prescribed medications. Barriers to  medication compliance addressed. All patient questions answered. Pt  verbalized understanding of all instructions given. Patient given educational materials - see patient instructions      Patient advised to contact scheduling offices for any referrals or imaging orders  placed today if they have not been contacted in 48 hours. Return in about 6 months (around 2/15/2023). An electronic signature was used to authenticate this note. --RADHIKA Ghotra CNP on 8/15/2022 at 10:32 AM  Visit Information    Have you changed or started any medications since your last visit including any over-the-counter medicines, vitamins, or herbal medicines? no   Are you having any side effects from any of your medications? -  no  Have you stopped taking any of your medications? Is so, why? -  no    Have you seen any other physician or provider since your last visit? No  Have you had any other diagnostic tests since your last visit? No  Have you been seen in the emergency room and/or had an admission to a hospital since we last saw you? No  Have you had your routine dental cleaning in the past 6 months? no    Have you activated your [employer] account? If not, what are your barriers?  Yes     Patient Care Team:  RADHIKA Ghotra CNP as PCP - General (Family Medicine)  RADHIKA Ghotra CNP as PCP - [facility]  RADHIKA Anand CNP as Consulting Physician (Rheumatology)  Chanda Sidhu MD as Consulting Physician (Cardiology)    Medical History Review  Past Medical, Family, and Social History reviewed and does not contribute to the patient presenting condition    Health Maintenance   Topic Date Due    COVID-19 Vaccine (1) Never done Pneumococcal 65+ years Vaccine (1 - PCV) Never done    DTaP/Tdap/Td vaccine (1 - Tdap) Never done    Shingles vaccine (1 of 2) Never done    Annual Wellness Visit (AWV)  03/13/2022    Breast cancer screen  06/03/2022    Flu vaccine (1) 09/01/2022    Diabetic retinal exam  10/29/2022    Diabetic microalbuminuria test  12/20/2022    Lipids  12/20/2022    Depression Screen  04/14/2023    Diabetic foot exam  08/15/2023    A1C test (Diabetic or Prediabetic)  08/15/2023    Colorectal Cancer Screen  08/27/2029    DEXA (modify frequency per FRAX score)  Addressed    Hepatitis C screen  Addressed    Hepatitis A vaccine  Aged Out    Hib vaccine  Aged Out    Meningococcal (ACWY) vaccine  Aged Out

## 2022-09-19 RX ORDER — LISINOPRIL 40 MG/1
40 TABLET ORAL DAILY
Qty: 30 TABLET | Refills: 5 | Status: SHIPPED | OUTPATIENT
Start: 2022-09-19

## 2022-09-19 NOTE — TELEPHONE ENCOUNTER
Health Maintenance   Topic Date Due    COVID-19 Vaccine (1) Never done    Pneumococcal 65+ years Vaccine (1 - PCV) Never done    DTaP/Tdap/Td vaccine (1 - Tdap) Never done    Shingles vaccine (1 of 2) Never done    Annual Wellness Visit (AWV)  05/04/2022    Breast cancer screen  06/03/2022    Flu vaccine (1) Never done    Diabetic retinal exam  10/29/2022    Diabetic microalbuminuria test  12/20/2022    Lipids  12/20/2022    Depression Screen  04/14/2023    Diabetic foot exam  08/15/2023    A1C test (Diabetic or Prediabetic)  08/15/2023    Colorectal Cancer Screen  08/27/2029    DEXA (modify frequency per FRAX score)  Addressed    Hepatitis C screen  Addressed    Hepatitis A vaccine  Aged Out    Hib vaccine  Aged Out    Meningococcal (ACWY) vaccine  Aged Out             (applicable per patient's age: Cancer Screenings, Depression Screening, Fall Risk Screening, Immunizations)    Hemoglobin A1C (%)   Date Value   08/15/2022 7.0   04/14/2022 7.1   10/14/2021 6.7     LDL Calculated (mg/dL)   Date Value   12/20/2021 127     AST (U/L)   Date Value   12/20/2021 31     ALT (U/L)   Date Value   12/20/2021 27     BUN (mg/dL)   Date Value   12/20/2021 11      (goal A1C is < 7)   (goal LDL is <100) need 30-50% reduction from baseline     BP Readings from Last 3 Encounters:   08/15/22 (!) 145/93   05/04/22 (!) 192/84   05/03/22 (!) 198/99    (goal /80)      All Future Testing planned in [facility]:  Lab Frequency Next Occurrence   JAYA DIGITAL SCREEN W OR WO CAD BILATERAL Once 05/16/2022   Comprehensive Metabolic Panel Once 50/03/6756       Next Visit Date:  Future Appointments   Date Time Provider Augustina Lopez   2/15/2023 10:00 AM RADHIKA Wilcox - CNP [facility]            Patient Active Problem List:     Hyperlipidemia     DJD (degenerative joint disease) of cervical spine     Meningioma (HCC)     FH: breast cancer in first degree relative     Fibrocystic breast disease in female     Essential

## 2023-02-06 RX ORDER — LISINOPRIL 40 MG/1
40 TABLET ORAL DAILY
Qty: 60 TABLET | Refills: 5 | Status: SHIPPED | OUTPATIENT
Start: 2023-02-06

## 2023-02-15 ENCOUNTER — OFFICE VISIT (OUTPATIENT)
Dept: PRIMARY CARE CLINIC | Age: 73
End: 2023-02-15

## 2023-02-15 ENCOUNTER — HOSPITAL ENCOUNTER (OUTPATIENT)
Age: 73
Discharge: HOME | End: 2023-02-15
Payer: MEDICARE

## 2023-02-15 VITALS
WEIGHT: 150.4 LBS | HEIGHT: 63 IN | SYSTOLIC BLOOD PRESSURE: 160 MMHG | OXYGEN SATURATION: 98 % | DIASTOLIC BLOOD PRESSURE: 90 MMHG | BODY MASS INDEX: 26.65 KG/M2 | HEART RATE: 64 BPM

## 2023-02-15 DIAGNOSIS — E11.9 TYPE 2 DIABETES MELLITUS WITHOUT COMPLICATION, WITHOUT LONG-TERM CURRENT USE OF INSULIN (HCC): ICD-10-CM

## 2023-02-15 DIAGNOSIS — H10.13 ALLERGIC CONJUNCTIVITIS OF BOTH EYES: ICD-10-CM

## 2023-02-15 DIAGNOSIS — M25.572 ARTHRALGIA OF ANKLE OR FOOT, LEFT: ICD-10-CM

## 2023-02-15 DIAGNOSIS — E11.9 TYPE 2 DIABETES MELLITUS WITHOUT COMPLICATION, WITHOUT LONG-TERM CURRENT USE OF INSULIN (HCC): Primary | ICD-10-CM

## 2023-02-15 DIAGNOSIS — Z76.0 MEDICATION REFILL: ICD-10-CM

## 2023-02-15 DIAGNOSIS — I10 ESSENTIAL HYPERTENSION: ICD-10-CM

## 2023-02-15 DIAGNOSIS — M05.79 RHEUMATOID ARTHRITIS INVOLVING MULTIPLE SITES WITH POSITIVE RHEUMATOID FACTOR (HCC): ICD-10-CM

## 2023-02-15 LAB
CREATININE URINE: 215.6 MG/DL (ref 28–217)
HBA1C MFR BLD: 7 %
MICROALBUMIN/CREAT 24H UR: 18 MG/L
MICROALBUMIN/CREAT UR-RTO: 8 MCG/MG CREAT
URATE SERPL-MCNC: 5.3 MG/DL (ref 2.4–5.7)

## 2023-02-15 PROCEDURE — 36415 COLL VENOUS BLD VENIPUNCTURE: CPT

## 2023-02-15 PROCEDURE — 82043 UR ALBUMIN QUANTITATIVE: CPT

## 2023-02-15 PROCEDURE — 84550 ASSAY OF BLOOD/URIC ACID: CPT

## 2023-02-15 PROCEDURE — 82570 ASSAY OF URINE CREATININE: CPT

## 2023-02-15 RX ORDER — FOLIC ACID 1 MG/1
1 TABLET ORAL DAILY
Qty: 90 TABLET | Refills: 1 | Status: SHIPPED | OUTPATIENT
Start: 2023-02-15

## 2023-02-15 RX ORDER — OLOPATADINE HYDROCHLORIDE 1 MG/ML
1 SOLUTION/ DROPS OPHTHALMIC 2 TIMES DAILY
Qty: 1 EACH | Refills: 2 | Status: SHIPPED | OUTPATIENT
Start: 2023-02-15 | End: 2023-03-17

## 2023-02-15 SDOH — ECONOMIC STABILITY: FOOD INSECURITY: WITHIN THE PAST 12 MONTHS, THE FOOD YOU BOUGHT JUST DIDN'T LAST AND YOU DIDN'T HAVE MONEY TO GET MORE.: NEVER TRUE

## 2023-02-15 SDOH — ECONOMIC STABILITY: HOUSING INSECURITY
IN THE LAST 12 MONTHS, WAS THERE A TIME WHEN YOU DID NOT HAVE A STEADY PLACE TO SLEEP OR SLEPT IN A SHELTER (INCLUDING NOW)?: NO

## 2023-02-15 SDOH — ECONOMIC STABILITY: FOOD INSECURITY: WITHIN THE PAST 12 MONTHS, YOU WORRIED THAT YOUR FOOD WOULD RUN OUT BEFORE YOU GOT MONEY TO BUY MORE.: NEVER TRUE

## 2023-02-15 SDOH — ECONOMIC STABILITY: INCOME INSECURITY: HOW HARD IS IT FOR YOU TO PAY FOR THE VERY BASICS LIKE FOOD, HOUSING, MEDICAL CARE, AND HEATING?: NOT HARD AT ALL

## 2023-02-15 ASSESSMENT — PATIENT HEALTH QUESTIONNAIRE - PHQ9
1. LITTLE INTEREST OR PLEASURE IN DOING THINGS: 0
SUM OF ALL RESPONSES TO PHQ QUESTIONS 1-9: 0
2. FEELING DOWN, DEPRESSED OR HOPELESS: 0
SUM OF ALL RESPONSES TO PHQ QUESTIONS 1-9: 0
SUM OF ALL RESPONSES TO PHQ9 QUESTIONS 1 & 2: 0

## 2023-02-15 ASSESSMENT — ENCOUNTER SYMPTOMS
SORE THROAT: 0
EYE PAIN: 0
CHEST TIGHTNESS: 0
EYE ITCHING: 1
EYE DISCHARGE: 1
ABDOMINAL PAIN: 0
SHORTNESS OF BREATH: 0
VOMITING: 0
COUGH: 0
BLURRED VISION: 0

## 2023-02-15 NOTE — PROGRESS NOTES
[address] [facility]  [id number] [address]  Dept: [phone]  Dept Fax: [phone]    Gurpreet Schmid (:  1950) is a 71 y.o. female,Established patient, here for evaluation of the following chief complaint(s):  Diabetes, Hypertension, Numbness (Right ankle), and Lower Back Pain (Left side down to foot)         ASSESSMENT/PLAN:  1. Type 2 diabetes mellitus without complication, without long-term current use of insulin (HCC)  -     POCT glycosylated hemoglobin (Hb A1C)  -     Microalbumin, Ur; Future  -     metFORMIN (GLUCOPHAGE) 500 MG tablet; Take 2 tablets by mouth daily (with breakfast) AND 1 tablet Daily with supper. TAKE 1 TABLET TWICE A DAY WITH MEALS., Disp-270 tablet, R-1Normal  2. Essential hypertension  3. Rheumatoid arthritis involving multiple sites with positive rheumatoid factor ([facility] [address]  4. Arthralgia of ankle or foot, left  -     Uric Acid; Future  5. Medication refill  -     metFORMIN (GLUCOPHAGE) 500 MG tablet; Take 2 tablets by mouth daily (with breakfast) AND 1 tablet Daily with supper. TAKE 1 TABLET TWICE A DAY WITH MEALS., Disp-270 tablet, [id number]  -     folic acid (FOLVITE) 1 MG tablet; Take 1 tablet by mouth daily, Disp-90 tablet, R-1Normal  6. Allergic conjunctivitis of both eyes  -     olopatadine (PATANOL) 0.1 % ophthalmic solution; Place 1 drop into both eyes 2 times daily, Disp-1 each, R-2Normal    We will start eyedrops for allergic eye symptoms, encouraged use of Flonase daily as well for allergy control. Blood pressure is elevated today, likely secondary to acute pain. Monitor closely. Patient without any back pain reproducible today. Given consistent left foot pain and use hydrochlorothiazide, will check uric acid level as she has had 1 week of symptoms. Offered ketorolac IM today, patient declined. Encouraged use of Motrin as needed for pain control as it is helping.   A1c remains at 7, will increase metformin to 1000 in the a.m. and 500 in the afternoon as she is on prednisone 5 mg daily for RA. Patient provided with good Rx coupon for folic acid. No follow-ups on file. Subjective   SUBJECTIVE/OBJECTIVE:  Edith Diaz is a 68-year female here today for routine follow-up hypertension and diabetes. She follows with cardiology consultants, Dr. Emanuel Sanz. Due back in July and goes every 6 months. No recent changes to blood pressure medication. She feels blood pressure is high today she is having increased pain in her left foot. Edith Diaz c/o sudden onset pain, suspect she is having a flare from her RA. Started 1 week ago in left lower back, the moved down to upper thigh and now in her left foot. Pain worst in her foot today. Took 800 Motrin today, which did help. Denies any redness or swelling of the foot. No history of gout, but does take hydrochlorothiazide for hypertension. No fall or known injury    Asking for assistance with her folic acid, is to take along with methotrexate but her new insurance is not covering the supplement. Diabetes  She presents for her follow-up diabetic visit. She has type 2 diabetes mellitus. Her disease course has been stable. Pertinent negatives for hypoglycemia include no headaches. Pertinent negatives for diabetes include no blurred vision, no chest pain, no fatigue and no weakness. Symptoms are stable. Hypertension  This is a chronic problem. The problem is unchanged. Pertinent negatives include no blurred vision, chest pain, headaches, neck pain, peripheral edema or shortness of breath. Risk factors for coronary artery disease include diabetes mellitus and post-menopausal state. Past treatments include beta blockers, diuretics and angiotensin blockers. There is no history of a thyroid problem. Review of Systems   Constitutional:  Negative for chills, diaphoresis, fatigue and fever. HENT:  Positive for sneezing.  Negative for congestion and sore throat. Eyes:  Positive for discharge (watery) and itching. Negative for blurred vision and pain. Respiratory:  Negative for cough, chest tightness and shortness of breath. Cardiovascular:  Negative for chest pain and leg swelling. Gastrointestinal:  Negative for abdominal pain and vomiting. Genitourinary:  Negative for difficulty urinating, dysuria, hematuria, vaginal bleeding and vaginal discharge. Musculoskeletal:  Positive for arthralgias. Negative for myalgias and neck pain. Skin:  Negative for rash and wound. Allergic/Immunologic: Positive for environmental allergies. Neurological:  Negative for weakness, numbness and headaches. Objective     DIAGNOSTIC FINDINGS:  CBC:  Lab Results   Component Value Date/Time    WBC 4.3 10/31/2021 01:24 PM    HGB 14.6 10/31/2021 01:24 PM     10/31/2021 01:24 PM       BMP:    Lab Results   Component Value Date/Time     12/20/2021 12:00 AM    K 3.6 12/20/2021 12:00 AM    CL 99 12/20/2021 12:00 AM    CO2 31 12/20/2021 12:00 AM    BUN 11 12/20/2021 12:00 AM    CREATININE 0.88 12/20/2021 12:00 AM    GLUCOSE 192 10/31/2021 01:24 PM       HEMOGLOBIN A1C:   Lab Results   Component Value Date/Time    LABA1C 7.0 02/15/2023 10:05 AM       FASTING LIPID PANEL:  Lab Results   Component Value Date    CHOL 180 10/12/2020    HDL 70 12/20/2021    TRIG 151 10/12/2020       Physical Exam  Vitals and nursing note reviewed. Constitutional:       General: She is not in acute distress. Appearance: She is well-developed. HENT:      Head: Normocephalic. Eyes:      General: No scleral icterus. Pupils: Pupils are equal, round, and reactive to light. Cardiovascular:      Rate and Rhythm: Regular rhythm. Pulmonary:      Effort: Pulmonary effort is normal.      Breath sounds: Normal breath sounds. Abdominal:      Palpations: Abdomen is soft. Musculoskeletal:      Cervical back: Normal range of motion and neck supple. Lumbar back: No tenderness or bony tenderness. Right lower leg: No edema. Left lower leg: No edema. Right ankle: No ecchymosis. Normal range of motion. Left ankle: No ecchymosis. Normal range of motion. Skin:     General: Skin is warm and dry. Neurological:      Mental Status: She is alert and oriented to person, place, and time. Coordination: Coordination normal.   Psychiatric:         Behavior: Behavior normal. Behavior is cooperative. Thought Content: Thought content normal.         Judgment: Judgment normal.          An electronic signature was used to authenticate this note.     --RADHIKA Tavera - CNP

## 2023-02-15 NOTE — PROGRESS NOTES
Patient in office for 6 month DM follow up. Numbness on back of right ankle  Lower back pain, radiated to thigh to foot. Was causing issues walking  Watery/itchy eyes. Folic Acid- Insurance does not cover  Visit Information    Have you changed or started any medications since your last visit including any over-the-counter medicines, vitamins, or herbal medicines? no   Have you stopped taking any of your medications? Is so, why? -  no  Are you having any side effects from any of your medications? - no    Have you seen any other physician or provider since your last visit?  no   Have you had any other diagnostic tests since your last visit?  no   Have you been seen in the emergency room and/or had an admission in a hospital since we last saw you?  no   Have you had your routine dental cleaning in the past 6 months?  no     Do you have an active MyChart account? If no, what is the barrier?   Yes    Patient Care Team:  RADHIKA Segura CNP as PCP - General (Family Medicine)  RADHIKA Segura CNP as PCP - [facility]  [id number] [place] [address] APRN - CNP as Consulting Physician (Rheumatology)  Royce Coley MD as Consulting Physician (Cardiology)    Medical History Review  Past Medical, Family, and Social History reviewed and does contribute to the patient presenting condition    Health Maintenance   Topic Date Due    COVID-19 Vaccine (1) Never done    Pneumococcal 65+ years Vaccine (1 - PCV) Never done    DTaP/Tdap/Td vaccine (1 - Tdap) Never done    Shingles vaccine (1 of 2) Never done    Diabetic retinal exam  10/29/2021    Annual Wellness Visit (AWV)  05/04/2022    Flu vaccine (1) Never done    GFR test (Diabetes, CKD 3-4, OR last GFR 15-59)  10/31/2022    Diabetic Alb to Cr ratio (uACR) test  12/20/2022    Depression Screen  04/14/2023    Lipids  05/12/2023    Breast cancer screen  06/07/2023    Diabetic foot exam  08/15/2023    A1C test (Diabetic or Prediabetic)  08/15/2023    Colorectal Cancer Screen  08/27/2029    DEXA (modify frequency per FRAX score)  Completed    Hepatitis C screen  Addressed    Hepatitis A vaccine  Aged Out    Hib vaccine  Aged Out    Meningococcal (ACWY) vaccine  Aged Out

## 2023-02-16 LAB
CREATININE URINE: 231.9 MG/DL (ref 28–217)
MICROALBUMIN/CREAT 24H UR: 44 MG/L
MICROALBUMIN/CREAT UR-RTO: 19 MCG/MG CREAT

## 2023-02-17 ENCOUNTER — TELEPHONE (OUTPATIENT)
Dept: PRIMARY CARE CLINIC | Age: 73
End: 2023-02-17

## 2023-02-17 NOTE — TELEPHONE ENCOUNTER
Patient called into the office regarding microalb. Results. I told her they were normal.       She is stating that her leg really hurts, up to her thigh and that she has tried taking ibuprofen to help with no relief. She is asking if this is a flare up and if there is anything more she should be doing for treatment.

## 2023-02-18 NOTE — TELEPHONE ENCOUNTER
I am aware she declined it during her visit, however the suggested treatment for a flare is steroid treatment.   There are walk-in clinics open on the weekend they can provide a steroid intramuscular injection

## 2023-02-20 ENCOUNTER — TELEPHONE (OUTPATIENT)
Dept: PRIMARY CARE CLINIC | Age: 73
End: 2023-02-20

## 2023-02-20 DIAGNOSIS — E11.9 TYPE 2 DIABETES MELLITUS WITHOUT COMPLICATION, WITHOUT LONG-TERM CURRENT USE OF INSULIN (HCC): ICD-10-CM

## 2023-02-20 DIAGNOSIS — Z76.0 MEDICATION REFILL: ICD-10-CM

## 2023-02-20 NOTE — TELEPHONE ENCOUNTER
Pharmacy sent a fax in asking for clarification on the metformin refill. Is she to take BID or otherwise?

## 2023-06-18 DIAGNOSIS — E11.9 TYPE 2 DIABETES MELLITUS WITHOUT COMPLICATION, WITHOUT LONG-TERM CURRENT USE OF INSULIN (HCC): ICD-10-CM

## 2023-06-20 RX ORDER — CALCIUM CITRATE/VITAMIN D3 200MG-6.25
TABLET ORAL
Qty: 100 STRIP | Refills: 1 | Status: SHIPPED | OUTPATIENT
Start: 2023-06-20

## 2023-06-20 NOTE — TELEPHONE ENCOUNTER
LAST VISIT:   2/15/2023     Future Appointments   Date Time Provider Augustina Lopez   8/15/2023 10:00 AM RADHIKA Evans - CNP [facility]

## 2023-06-29 ENCOUNTER — OFFICE VISIT (OUTPATIENT)
Dept: PRIMARY CARE CLINIC | Age: 73
End: 2023-06-29
Payer: MEDICARE

## 2023-06-29 VITALS
SYSTOLIC BLOOD PRESSURE: 192 MMHG | WEIGHT: 148 LBS | HEART RATE: 74 BPM | OXYGEN SATURATION: 98 % | BODY MASS INDEX: 26.22 KG/M2 | DIASTOLIC BLOOD PRESSURE: 92 MMHG

## 2023-06-29 DIAGNOSIS — J01.90 SUBACUTE SINUSITIS, UNSPECIFIED LOCATION: Primary | ICD-10-CM

## 2023-06-29 DIAGNOSIS — E11.9 TYPE 2 DIABETES MELLITUS WITHOUT COMPLICATION, WITHOUT LONG-TERM CURRENT USE OF INSULIN (HCC): ICD-10-CM

## 2023-06-29 DIAGNOSIS — J40 BRONCHITIS: ICD-10-CM

## 2023-06-29 LAB — HBA1C MFR BLD: 7.6 %

## 2023-06-29 PROCEDURE — 3080F DIAST BP >= 90 MM HG: CPT | Performed by: NURSE PRACTITIONER

## 2023-06-29 PROCEDURE — 1036F TOBACCO NON-USER: CPT | Performed by: NURSE PRACTITIONER

## 2023-06-29 PROCEDURE — 3051F HG A1C>EQUAL 7.0%<8.0%: CPT | Performed by: NURSE PRACTITIONER

## 2023-06-29 PROCEDURE — G8417 CALC BMI ABV UP PARAM F/U: HCPCS | Performed by: NURSE PRACTITIONER

## 2023-06-29 PROCEDURE — 3017F COLORECTAL CA SCREEN DOC REV: CPT | Performed by: NURSE PRACTITIONER

## 2023-06-29 PROCEDURE — 83037 HB GLYCOSYLATED A1C HOME DEV: CPT | Performed by: NURSE PRACTITIONER

## 2023-06-29 PROCEDURE — G8427 DOCREV CUR MEDS BY ELIG CLIN: HCPCS | Performed by: NURSE PRACTITIONER

## 2023-06-29 PROCEDURE — 1090F PRES/ABSN URINE INCON ASSESS: CPT | Performed by: NURSE PRACTITIONER

## 2023-06-29 PROCEDURE — 99214 OFFICE O/P EST MOD 30 MIN: CPT | Performed by: NURSE PRACTITIONER

## 2023-06-29 PROCEDURE — 1123F ACP DISCUSS/DSCN MKR DOCD: CPT | Performed by: NURSE PRACTITIONER

## 2023-06-29 PROCEDURE — G8400 PT W/DXA NO RESULTS DOC: HCPCS | Performed by: NURSE PRACTITIONER

## 2023-06-29 PROCEDURE — 2022F DILAT RTA XM EVC RTNOPTHY: CPT | Performed by: NURSE PRACTITIONER

## 2023-06-29 PROCEDURE — 3077F SYST BP >= 140 MM HG: CPT | Performed by: NURSE PRACTITIONER

## 2023-06-29 RX ORDER — DOXYCYCLINE HYCLATE 100 MG
100 TABLET ORAL 2 TIMES DAILY
Qty: 14 TABLET | Refills: 0 | Status: SHIPPED | OUTPATIENT
Start: 2023-06-29 | End: 2023-07-06

## 2023-06-29 RX ORDER — METHYLPREDNISOLONE 4 MG/1
TABLET ORAL
Qty: 1 KIT | Refills: 0 | Status: SHIPPED | OUTPATIENT
Start: 2023-06-29 | End: 2023-07-05

## 2023-06-29 ASSESSMENT — ENCOUNTER SYMPTOMS
WHEEZING: 1
SINUS PAIN: 0
COUGH: 1
SORE THROAT: 0
NAUSEA: 0
VOMITING: 0
RHINORRHEA: 1

## 2023-07-06 DIAGNOSIS — Z12.31 ENCOUNTER FOR SCREENING MAMMOGRAM FOR MALIGNANT NEOPLASM OF BREAST: Primary | ICD-10-CM

## 2023-08-15 ENCOUNTER — OFFICE VISIT (OUTPATIENT)
Dept: PRIMARY CARE CLINIC | Age: 73
End: 2023-08-15
Payer: MEDICARE

## 2023-08-15 VITALS
HEIGHT: 63 IN | WEIGHT: 146.6 LBS | SYSTOLIC BLOOD PRESSURE: 161 MMHG | HEART RATE: 80 BPM | OXYGEN SATURATION: 99 % | BODY MASS INDEX: 25.98 KG/M2 | DIASTOLIC BLOOD PRESSURE: 94 MMHG

## 2023-08-15 DIAGNOSIS — I10 ESSENTIAL HYPERTENSION: ICD-10-CM

## 2023-08-15 DIAGNOSIS — E11.9 TYPE 2 DIABETES MELLITUS WITHOUT COMPLICATION, WITHOUT LONG-TERM CURRENT USE OF INSULIN (HCC): ICD-10-CM

## 2023-08-15 DIAGNOSIS — Z00.00 MEDICARE ANNUAL WELLNESS VISIT, SUBSEQUENT: Primary | ICD-10-CM

## 2023-08-15 DIAGNOSIS — Z23 NEED FOR VACCINATION: ICD-10-CM

## 2023-08-15 PROCEDURE — G0439 PPPS, SUBSEQ VISIT: HCPCS | Performed by: NURSE PRACTITIONER

## 2023-08-15 PROCEDURE — 1123F ACP DISCUSS/DSCN MKR DOCD: CPT | Performed by: NURSE PRACTITIONER

## 2023-08-15 PROCEDURE — 3080F DIAST BP >= 90 MM HG: CPT | Performed by: NURSE PRACTITIONER

## 2023-08-15 PROCEDURE — 1036F TOBACCO NON-USER: CPT | Performed by: NURSE PRACTITIONER

## 2023-08-15 PROCEDURE — 3051F HG A1C>EQUAL 7.0%<8.0%: CPT | Performed by: NURSE PRACTITIONER

## 2023-08-15 PROCEDURE — G8417 CALC BMI ABV UP PARAM F/U: HCPCS | Performed by: NURSE PRACTITIONER

## 2023-08-15 PROCEDURE — G0009 ADMIN PNEUMOCOCCAL VACCINE: HCPCS | Performed by: NURSE PRACTITIONER

## 2023-08-15 PROCEDURE — 2022F DILAT RTA XM EVC RTNOPTHY: CPT | Performed by: NURSE PRACTITIONER

## 2023-08-15 PROCEDURE — G8400 PT W/DXA NO RESULTS DOC: HCPCS | Performed by: NURSE PRACTITIONER

## 2023-08-15 PROCEDURE — G8427 DOCREV CUR MEDS BY ELIG CLIN: HCPCS | Performed by: NURSE PRACTITIONER

## 2023-08-15 PROCEDURE — 99213 OFFICE O/P EST LOW 20 MIN: CPT | Performed by: NURSE PRACTITIONER

## 2023-08-15 PROCEDURE — 90677 PCV20 VACCINE IM: CPT | Performed by: NURSE PRACTITIONER

## 2023-08-15 PROCEDURE — 1090F PRES/ABSN URINE INCON ASSESS: CPT | Performed by: NURSE PRACTITIONER

## 2023-08-15 PROCEDURE — 3017F COLORECTAL CA SCREEN DOC REV: CPT | Performed by: NURSE PRACTITIONER

## 2023-08-15 PROCEDURE — 3077F SYST BP >= 140 MM HG: CPT | Performed by: NURSE PRACTITIONER

## 2023-08-15 ASSESSMENT — PATIENT HEALTH QUESTIONNAIRE - PHQ9
1. LITTLE INTEREST OR PLEASURE IN DOING THINGS: 0
SUM OF ALL RESPONSES TO PHQ QUESTIONS 1-9: 0
SUM OF ALL RESPONSES TO PHQ9 QUESTIONS 1 & 2: 0
2. FEELING DOWN, DEPRESSED OR HOPELESS: 0

## 2023-08-15 ASSESSMENT — LIFESTYLE VARIABLES
HOW MANY STANDARD DRINKS CONTAINING ALCOHOL DO YOU HAVE ON A TYPICAL DAY: PATIENT DOES NOT DRINK
HOW OFTEN DO YOU HAVE A DRINK CONTAINING ALCOHOL: NEVER

## 2023-08-15 NOTE — PROGRESS NOTES
Medicare Annual Wellness Visit    Alina Oneal is here for Diabetes (Patient is here for 6 month follow up.), Hypertension (Patient is here for 6 month follow up.), and Medicare AWV (Patient is here for Medicare Annual Wellness Visit.)    Assessment & Plan   Medicare annual wellness visit, subsequent  Essential hypertension  Type 2 diabetes mellitus without complication, without long-term current use of insulin ([address])  Need for vaccination  -     Pneumococcal, PCV20, PREVNAR 20, (age 25 yrs+), IM, PF    Blood pressure managed by cardiology at the clinic  Patient without any neurological symptoms or hemangioma, MRI without any changes. Ok to monitor    Recommendations for Preventive Services Due: see orders and patient instructions/AVS.  Recommended screening schedule for the next 5-10 years is provided to the patient in written form: see Patient Instructions/AVS.     Return in 6 months (on 2/15/2024) for Diabetes, HTN. Subjective   The following acute and/or chronic problems were also addressed today:  Neurologist retired, Dr Jim Barrera. The arthritis doctor said scans looked fine in regards to her hemangioma. Patient's complete Health Risk Assessment and screening values have been reviewed and are found in Flowsheets. The following problems were reviewed today and where indicated follow up appointments were made and/or referrals ordered. Positive Risk Factor Screenings with Interventions:       Cognitive:    Words recalled: 3 Words Recalled   Clock Drawing Test (CDT): (!) Abnormal   Total Score: 3   Total Score Interpretation: Normal Mini-Cog      Interventions:  See AVS for additional education material                Vision Screen:  Do you have difficulty driving, watching TV, or doing any of your daily activities because of your eyesight?: (!) Yes (Patient states she does not drive at night because of her eyesight.)  Have you had an eye exam within the past year?: Yes  No results

## 2023-08-28 DIAGNOSIS — E11.9 TYPE 2 DIABETES MELLITUS WITHOUT COMPLICATION, WITHOUT LONG-TERM CURRENT USE OF INSULIN (HCC): ICD-10-CM

## 2023-08-29 RX ORDER — GLUCOSAM/CHON-MSM1/C/MANG/BOSW 500-416.6
TABLET ORAL
Qty: 100 EACH | Refills: 5 | Status: SHIPPED | OUTPATIENT
Start: 2023-08-29

## 2023-08-31 DIAGNOSIS — Z76.0 MEDICATION REFILL: ICD-10-CM

## 2023-08-31 DIAGNOSIS — E11.9 TYPE 2 DIABETES MELLITUS WITHOUT COMPLICATION, WITHOUT LONG-TERM CURRENT USE OF INSULIN (HCC): ICD-10-CM

## 2023-09-13 DIAGNOSIS — M06.9 RHEUMATOID ARTHRITIS (HCC): ICD-10-CM

## 2023-09-13 RX ORDER — PREDNISONE 5 MG/1
5 TABLET ORAL DAILY
OUTPATIENT
Start: 2023-09-13

## 2023-10-19 ENCOUNTER — TELEPHONE (OUTPATIENT)
Dept: PRIMARY CARE CLINIC | Age: 73
End: 2023-10-19

## 2023-10-19 DIAGNOSIS — J06.9 VIRAL URI WITH COUGH: ICD-10-CM

## 2023-10-19 RX ORDER — BENZONATATE 200 MG/1
200 CAPSULE ORAL 3 TIMES DAILY PRN
Qty: 30 CAPSULE | Refills: 1 | Status: SHIPPED | OUTPATIENT
Start: 2023-10-19 | End: 2023-10-26

## 2023-10-19 NOTE — TELEPHONE ENCOUNTER
Patient calling in to inform she had a cough for the past six days, stated its a dry cough, she also have a sore throat.  Patient would like for you to prescribe her benzonatate

## 2023-10-20 ENCOUNTER — HOSPITAL ENCOUNTER (EMERGENCY)
Age: 73
Discharge: HOME | End: 2023-10-20
Attending: EMERGENCY MEDICINE
Payer: MEDICARE

## 2023-10-20 ENCOUNTER — APPOINTMENT (OUTPATIENT)
Dept: GENERAL RADIOLOGY | Age: 73
End: 2023-10-20
Payer: MEDICARE

## 2023-10-20 VITALS
TEMPERATURE: 98.4 F | WEIGHT: 147 LBS | BODY MASS INDEX: 26.05 KG/M2 | SYSTOLIC BLOOD PRESSURE: 205 MMHG | RESPIRATION RATE: 18 BRPM | HEIGHT: 63 IN | DIASTOLIC BLOOD PRESSURE: 93 MMHG | OXYGEN SATURATION: 94 % | HEART RATE: 75 BPM

## 2023-10-20 DIAGNOSIS — J02.9 ACUTE PHARYNGITIS, UNSPECIFIED ETIOLOGY: ICD-10-CM

## 2023-10-20 DIAGNOSIS — J06.9 ACUTE UPPER RESPIRATORY INFECTION: Primary | ICD-10-CM

## 2023-10-20 LAB
FLUAV RNA RESP QL NAA+PROBE: NOT DETECTED
FLUBV RNA RESP QL NAA+PROBE: NOT DETECTED
SARS-COV-2 RNA RESP QL NAA+PROBE: NOT DETECTED
SOURCE: NORMAL
SPECIMEN DESCRIPTION: NORMAL
SPECIMEN SOURCE: NORMAL
STREP A, MOLECULAR: NEGATIVE

## 2023-10-20 PROCEDURE — 6370000000 HC RX 637 (ALT 250 FOR IP): Performed by: EMERGENCY MEDICINE

## 2023-10-20 PROCEDURE — 99284 EMERGENCY DEPT VISIT MOD MDM: CPT

## 2023-10-20 PROCEDURE — 87636 SARSCOV2 & INF A&B AMP PRB: CPT

## 2023-10-20 PROCEDURE — 87651 STREP A DNA AMP PROBE: CPT

## 2023-10-20 PROCEDURE — 71045 X-RAY EXAM CHEST 1 VIEW: CPT

## 2023-10-20 RX ORDER — IBUPROFEN 800 MG/1
800 TABLET ORAL ONCE
Status: COMPLETED | OUTPATIENT
Start: 2023-10-20 | End: 2023-10-20

## 2023-10-20 RX ORDER — IBUPROFEN 600 MG/1
600 TABLET ORAL 3 TIMES DAILY PRN
Qty: 20 TABLET | Refills: 0 | Status: SHIPPED | OUTPATIENT
Start: 2023-10-20

## 2023-10-20 RX ORDER — GUAIFENESIN 600 MG/1
600 TABLET, EXTENDED RELEASE ORAL ONCE
Status: COMPLETED | OUTPATIENT
Start: 2023-10-20 | End: 2023-10-20

## 2023-10-20 RX ORDER — GUAIFENESIN 600 MG/1
600 TABLET, EXTENDED RELEASE ORAL 2 TIMES DAILY
Qty: 14 TABLET | Refills: 0 | Status: SHIPPED | OUTPATIENT
Start: 2023-10-20 | End: 2023-10-27

## 2023-10-20 RX ADMIN — GUAIFENESIN 600 MG: 600 TABLET ORAL at 12:38

## 2023-10-20 RX ADMIN — IBUPROFEN 800 MG: 800 TABLET ORAL at 12:38

## 2023-10-20 NOTE — ED NOTES
Pt advised to contact PCP about possible BP medication changes. Pt also advised to check her BP at home twice a day and keep a log to relay readings to PCP.  Pt verbalized understanding      Shaggy Kim RN  10/20/23 1509

## 2023-10-23 ASSESSMENT — ENCOUNTER SYMPTOMS
ABDOMINAL PAIN: 0
CHEST TIGHTNESS: 0
COUGH: 1
SORE THROAT: 1

## 2023-10-24 ENCOUNTER — OFFICE VISIT (OUTPATIENT)
Dept: PRIMARY CARE CLINIC | Age: 73
End: 2023-10-24
Payer: MEDICARE

## 2023-10-24 VITALS
HEART RATE: 86 BPM | OXYGEN SATURATION: 98 % | WEIGHT: 147 LBS | BODY MASS INDEX: 26.04 KG/M2 | DIASTOLIC BLOOD PRESSURE: 95 MMHG | SYSTOLIC BLOOD PRESSURE: 156 MMHG

## 2023-10-24 DIAGNOSIS — I10 ESSENTIAL HYPERTENSION: Primary | ICD-10-CM

## 2023-10-24 PROCEDURE — 1123F ACP DISCUSS/DSCN MKR DOCD: CPT | Performed by: NURSE PRACTITIONER

## 2023-10-24 PROCEDURE — G8427 DOCREV CUR MEDS BY ELIG CLIN: HCPCS | Performed by: NURSE PRACTITIONER

## 2023-10-24 PROCEDURE — 99213 OFFICE O/P EST LOW 20 MIN: CPT | Performed by: NURSE PRACTITIONER

## 2023-10-24 PROCEDURE — 1036F TOBACCO NON-USER: CPT | Performed by: NURSE PRACTITIONER

## 2023-10-24 PROCEDURE — 3017F COLORECTAL CA SCREEN DOC REV: CPT | Performed by: NURSE PRACTITIONER

## 2023-10-24 PROCEDURE — G8400 PT W/DXA NO RESULTS DOC: HCPCS | Performed by: NURSE PRACTITIONER

## 2023-10-24 PROCEDURE — 1090F PRES/ABSN URINE INCON ASSESS: CPT | Performed by: NURSE PRACTITIONER

## 2023-10-24 PROCEDURE — 3077F SYST BP >= 140 MM HG: CPT | Performed by: NURSE PRACTITIONER

## 2023-10-24 PROCEDURE — 3080F DIAST BP >= 90 MM HG: CPT | Performed by: NURSE PRACTITIONER

## 2023-10-24 PROCEDURE — G8484 FLU IMMUNIZE NO ADMIN: HCPCS | Performed by: NURSE PRACTITIONER

## 2023-10-24 PROCEDURE — G8417 CALC BMI ABV UP PARAM F/U: HCPCS | Performed by: NURSE PRACTITIONER

## 2023-10-24 RX ORDER — HYDROCHLOROTHIAZIDE 25 MG/1
25 TABLET ORAL DAILY
Qty: 30 TABLET | Refills: 1 | Status: SHIPPED | OUTPATIENT
Start: 2023-10-24 | End: 2023-12-23

## 2023-10-24 ASSESSMENT — ENCOUNTER SYMPTOMS
COUGH: 0
SHORTNESS OF BREATH: 0

## 2023-10-24 NOTE — PROGRESS NOTES
[address] [facility] PRIMARY CARE  [address] [id number] [facility] [address]  Dept: [phone]  Dept Fax: [phone]    Starla Lagunas (:  1950) is a 68 y.o. female,Established patient, here for evaluation of the following chief complaint(s):  Follow-Up from Hospital (Patient is here today for a hospital follow up )    Starla Lagunas presents today for ER follow-up, seen at [facility] emergency room on 10/20/2023 following 7 days of sore throat cough and nasal congestion. Patient hypertensive during ER visit without symptoms of lightheadedness, dizziness, or headache. Encouraged to follow-up with PCP. Rapid strep as well as COVID-19 and influenza testing all negative       ASSESSMENT/PLAN:  1. Essential hypertension    Blood pressure improved upon recheck, however still elevated. Given comorbid diabetes we did agree to increase hydrochlorothiazide dosing to 25 mg daily. Sees cardiology next month for follow-up. No follow-ups on file. Subjective   SUBJECTIVE/OBJECTIVE:  Starla Lagunas states she is here today for ER follow-up, blood pressure check. She is happy to report that she feels much better after recovering from her viral illness and has no further symptoms or concerns regarding her cold. She is taking medications for blood pressure as prescribed by Dr. Jerrod Ballard, her cardiologist        Review of Systems   Constitutional:  Negative for fever. Respiratory:  Negative for cough and shortness of breath. Cardiovascular:  Negative for chest pain.           Objective     DIAGNOSTIC FINDINGS:  CBC:  Lab Results   Component Value Date/Time    WBC 6.59 2023 09:01 AM    WBC 4.3 10/31/2021 01:24 PM    HGB 13.2 2023 09:01 AM     2023 09:01 AM     10/31/2021 01:24 PM       BMP:    Lab Results   Component Value Date/Time     2023 09:01 AM    K 3.5 2023 09:01 AM     2023 09:01 AM

## 2023-11-30 LAB
ALBUMIN: 4.2 G/DL
ALK PHOSPHATASE: 68 U/L
ALT SERPL-CCNC: 23 U/L
AST SERPL-CCNC: 33 U/L
BILIRUB SERPL-MCNC: 0.9 MG/DL
BUN BLDV-MCNC: 14 MG/DL
CALCIUM SERPL-MCNC: 9.2 MG/DL
CHLORIDE BLD-SCNC: 104 MMOL/L
CHOLESTEROL/HDL RATIO: 3.1
CHOLESTEROL: 229 MG/DL
CO2: 27 MMOL/L
CPK: 55 U/L
CREAT SERPL-MCNC: 0.72 MG/DL
GFR AFRICAN AMERICAN: 96.3 ML/M1.7
GFR NON-AFRICAN AMERICAN: 79.6 ML/M1.7
GLUCOSE: 128 MG/DL
HDLC SERPL-MCNC: 73 MG/DL
LDL CHOLESTEROL CALCULATED: 115 MG/DL
MAGNESIUM: 1.7 MG/DL
POTASSIUM SERPL-SCNC: 4.1 MMOL/L
SODIUM BLD-SCNC: 142 MMOL/L
TOTAL PROTEIN: 7.3 G/DL
TRIGL SERPL-MCNC: 203 MG/DL
VLDLC SERPL CALC-MCNC: 41 MG/DL

## 2024-01-12 RX ORDER — LISINOPRIL 40 MG/1
40 TABLET ORAL DAILY
Qty: 90 TABLET | Refills: 3 | Status: SHIPPED | OUTPATIENT
Start: 2024-01-12

## 2024-01-31 ENCOUNTER — OFFICE VISIT (OUTPATIENT)
Dept: PRIMARY CARE CLINIC | Age: 74
End: 2024-01-31
Payer: MEDICARE

## 2024-01-31 VITALS
BODY MASS INDEX: 25.86 KG/M2 | HEART RATE: 84 BPM | SYSTOLIC BLOOD PRESSURE: 126 MMHG | OXYGEN SATURATION: 100 % | DIASTOLIC BLOOD PRESSURE: 80 MMHG | WEIGHT: 146 LBS

## 2024-01-31 DIAGNOSIS — R01.1 NEWLY RECOGNIZED HEART MURMUR: ICD-10-CM

## 2024-01-31 DIAGNOSIS — D32.0 BENIGN NEOPLASM OF CEREBRAL MENINGES (HCC): ICD-10-CM

## 2024-01-31 DIAGNOSIS — R42 VERTIGO: Primary | ICD-10-CM

## 2024-01-31 DIAGNOSIS — D32.9 MENINGIOMA (HCC): ICD-10-CM

## 2024-01-31 PROCEDURE — 3079F DIAST BP 80-89 MM HG: CPT | Performed by: NURSE PRACTITIONER

## 2024-01-31 PROCEDURE — 1036F TOBACCO NON-USER: CPT | Performed by: NURSE PRACTITIONER

## 2024-01-31 PROCEDURE — 1123F ACP DISCUSS/DSCN MKR DOCD: CPT | Performed by: NURSE PRACTITIONER

## 2024-01-31 PROCEDURE — G8427 DOCREV CUR MEDS BY ELIG CLIN: HCPCS | Performed by: NURSE PRACTITIONER

## 2024-01-31 PROCEDURE — G8484 FLU IMMUNIZE NO ADMIN: HCPCS | Performed by: NURSE PRACTITIONER

## 2024-01-31 PROCEDURE — G8400 PT W/DXA NO RESULTS DOC: HCPCS | Performed by: NURSE PRACTITIONER

## 2024-01-31 PROCEDURE — 3017F COLORECTAL CA SCREEN DOC REV: CPT | Performed by: NURSE PRACTITIONER

## 2024-01-31 PROCEDURE — 3074F SYST BP LT 130 MM HG: CPT | Performed by: NURSE PRACTITIONER

## 2024-01-31 PROCEDURE — 1090F PRES/ABSN URINE INCON ASSESS: CPT | Performed by: NURSE PRACTITIONER

## 2024-01-31 PROCEDURE — 99213 OFFICE O/P EST LOW 20 MIN: CPT | Performed by: NURSE PRACTITIONER

## 2024-01-31 PROCEDURE — G8417 CALC BMI ABV UP PARAM F/U: HCPCS | Performed by: NURSE PRACTITIONER

## 2024-01-31 RX ORDER — MECLIZINE HYDROCHLORIDE 25 MG/1
25 TABLET ORAL 3 TIMES DAILY PRN
Qty: 15 TABLET | Refills: 0 | Status: SHIPPED | OUTPATIENT
Start: 2024-01-31 | End: 2024-02-10

## 2024-01-31 RX ORDER — AMLODIPINE BESYLATE 5 MG/1
20 TABLET ORAL DAILY
COMMUNITY

## 2024-01-31 ASSESSMENT — PATIENT HEALTH QUESTIONNAIRE - PHQ9
SUM OF ALL RESPONSES TO PHQ QUESTIONS 1-9: 0
2. FEELING DOWN, DEPRESSED OR HOPELESS: 0
SUM OF ALL RESPONSES TO PHQ QUESTIONS 1-9: 0
SUM OF ALL RESPONSES TO PHQ9 QUESTIONS 1 & 2: 0
1. LITTLE INTEREST OR PLEASURE IN DOING THINGS: 0

## 2024-01-31 NOTE — PROGRESS NOTES
[facility]  [facility] PRIMARY CARE  [facility] [facility] MAIN FLOOR  [facility] [address]  Dept: [phone]  Dept Fax: [phone]    Freida Amaro (:  1950) is a 73 y.o. female,Established patient, here for evaluation of the following chief complaint(s):  Dizziness (Patient is here today experiencing some vertigo )         ASSESSMENT/PLAN:  1. Vertigo  -     meclizine (ANTIVERT) 25 MG tablet; Take 1 tablet by mouth 3 times daily as needed for Dizziness, Disp-15 tablet, R-0Normal  2. Newly recognized heart murmur  -     Echo (TTE) complete (PRN contrast/bubble/strain/3D); Future  3. Benign neoplasm of cerebral meninges (HCC)  -     [facility]  4. [facility] ([facility])  -     [facility]    Patient reassured that symptoms appear consistent with vertigo.  Provided with home exercises and given a prescription for meclizine to be taken at bedtime as it currently is only an issue when lying flat.  I did appreciate a murmur during today's exam but I did not have documented during last visits, given history of hypertension and diabetes I would appreciate further evaluation with echo  Neurology referral placed today  Keep routine appointment in the next 2 weeks    No follow-ups on file.         Subjective   SUBJECTIVE/OBJECTIVE:  Freida Amaro presents today for evaluation of dizziness.  She states she started having symptoms Saturday evening.  It only occurs when she is lying down and typically more when she rolls from side-to-side.  She says this sensation as if the room is shifting around her or vibrating back-and-forth.  There is no dizziness going from sitting to standing, no difficulty walking or feeling unsteady with ambulation  There are no headaches, no double vision, no eye pain    The patient says she does have a history of a hemangioma that was treated by Dr. Guillen, did require radiation

## 2024-02-06 ENCOUNTER — HOSPITAL ENCOUNTER (OUTPATIENT)
Age: 74
Discharge: HOME | End: 2024-02-08
Payer: MEDICARE

## 2024-02-06 VITALS — HEIGHT: 63 IN | BODY MASS INDEX: 25.87 KG/M2 | WEIGHT: 146 LBS

## 2024-02-06 DIAGNOSIS — R01.1 NEWLY RECOGNIZED HEART MURMUR: ICD-10-CM

## 2024-02-06 LAB
ECHO AO ROOT DIAM: 2.8 CM
ECHO AO ROOT INDEX: 1.66 CM/M2
ECHO BSA: 1.72 M2
ECHO LA AREA 2C: 13.4 CM2
ECHO LA AREA 4C: 14.2 CM2
ECHO LA DIAMETER INDEX: 1.83 CM/M2
ECHO LA DIAMETER: 3.1 CM
ECHO LA MAJOR AXIS: 5.4 CM
ECHO LA MINOR AXIS: 4.8 CM
ECHO LA TO AORTIC ROOT RATIO: 1.11
ECHO LA VOL BP: 32 ML (ref 22–52)
ECHO LA VOL MOD A2C: 31 ML (ref 22–52)
ECHO LA VOL MOD A4C: 31 ML (ref 22–52)
ECHO LA VOL/BSA BIPLANE: 19 ML/M2 (ref 16–34)
ECHO LA VOLUME INDEX MOD A2C: 18 ML/M2 (ref 16–34)
ECHO LA VOLUME INDEX MOD A4C: 18 ML/M2 (ref 16–34)
ECHO LV E' LATERAL VELOCITY: 7 CM/S
ECHO LV E' SEPTAL VELOCITY: 7 CM/S
ECHO LV EDV A2C: 44 ML
ECHO LV EDV A4C: 49 ML
ECHO LV EDV INDEX A4C: 29 ML/M2
ECHO LV EDV NDEX A2C: 26 ML/M2
ECHO LV EJECTION FRACTION A2C: 65 %
ECHO LV EJECTION FRACTION A4C: 68 %
ECHO LV EJECTION FRACTION BIPLANE: 68 % (ref 55–100)
ECHO LV ESV A2C: 16 ML
ECHO LV ESV A4C: 16 ML
ECHO LV ESV INDEX A2C: 9 ML/M2
ECHO LV ESV INDEX A4C: 9 ML/M2
ECHO LV FRACTIONAL SHORTENING: 20 % (ref 28–44)
ECHO LV INTERNAL DIMENSION DIASTOLE INDEX: 2.07 CM/M2
ECHO LV INTERNAL DIMENSION DIASTOLIC: 3.5 CM (ref 3.9–5.3)
ECHO LV INTERNAL DIMENSION SYSTOLIC INDEX: 1.66 CM/M2
ECHO LV INTERNAL DIMENSION SYSTOLIC: 2.8 CM
ECHO LV IVSD: 1 CM (ref 0.6–0.9)
ECHO LV MASS 2D: 103.4 G (ref 67–162)
ECHO LV MASS INDEX 2D: 61.2 G/M2 (ref 43–95)
ECHO LV POSTERIOR WALL DIASTOLIC: 1 CM (ref 0.6–0.9)
ECHO LV RELATIVE WALL THICKNESS RATIO: 0.57
ECHO LVOT AREA: 2.8 CM2
ECHO LVOT DIAM: 1.9 CM
ECHO LVOT MEAN GRADIENT: 3 MMHG
ECHO LVOT PEAK GRADIENT: 5 MMHG
ECHO LVOT PEAK VELOCITY: 1.2 M/S
ECHO LVOT STROKE VOLUME INDEX: 42.9 ML/M2
ECHO LVOT SV: 72.5 ML
ECHO LVOT VTI: 25.6 CM
ECHO MV A VELOCITY: 1.09 M/S
ECHO MV AREA VTI: 2.4 CM2
ECHO MV E DECELERATION TIME (DT): 240 MS
ECHO MV E VELOCITY: 0.73 M/S
ECHO MV E/A RATIO: 0.67
ECHO MV E/E' LATERAL: 10.43
ECHO MV E/E' RATIO (AVERAGED): 10.43
ECHO MV LVOT VTI INDEX: 1.18
ECHO MV MAX VELOCITY: 1.2 M/S
ECHO MV MEAN GRADIENT: 1 MMHG
ECHO MV MEAN VELOCITY: 0.5 M/S
ECHO MV PEAK GRADIENT: 6 MMHG
ECHO MV VTI: 30.3 CM
ECHO PV MAX VELOCITY: 1 M/S
ECHO PV PEAK GRADIENT: 4 MMHG
ECHO RV BASAL DIMENSION: 2.7 CM
ECHO RV FREE WALL PEAK S': 14 CM/S
ECHO RV TAPSE: 2.2 CM (ref 1.7–?)

## 2024-02-06 PROCEDURE — 93306 TTE W/DOPPLER COMPLETE: CPT

## 2024-02-15 ENCOUNTER — OFFICE VISIT (OUTPATIENT)
Dept: PRIMARY CARE CLINIC | Age: 74
End: 2024-02-15
Payer: MEDICARE

## 2024-02-15 VITALS
BODY MASS INDEX: 26.22 KG/M2 | DIASTOLIC BLOOD PRESSURE: 88 MMHG | HEART RATE: 65 BPM | SYSTOLIC BLOOD PRESSURE: 149 MMHG | WEIGHT: 148 LBS | OXYGEN SATURATION: 100 %

## 2024-02-15 DIAGNOSIS — E11.9 TYPE 2 DIABETES MELLITUS WITHOUT COMPLICATION, WITHOUT LONG-TERM CURRENT USE OF INSULIN (HCC): Primary | ICD-10-CM

## 2024-02-15 DIAGNOSIS — Z28.21 INFLUENZA VACCINATION DECLINED: ICD-10-CM

## 2024-02-15 DIAGNOSIS — I10 ESSENTIAL HYPERTENSION: ICD-10-CM

## 2024-02-15 LAB
CREATININE URINE POCT: 100
HBA1C MFR BLD: 7.5 %
MICROALBUMIN/CREAT 24H UR: 10 MG/G{CREAT}
MICROALBUMIN/CREAT UR-RTO: <30

## 2024-02-15 PROCEDURE — 3079F DIAST BP 80-89 MM HG: CPT | Performed by: NURSE PRACTITIONER

## 2024-02-15 PROCEDURE — G8417 CALC BMI ABV UP PARAM F/U: HCPCS | Performed by: NURSE PRACTITIONER

## 2024-02-15 PROCEDURE — 1036F TOBACCO NON-USER: CPT | Performed by: NURSE PRACTITIONER

## 2024-02-15 PROCEDURE — 1123F ACP DISCUSS/DSCN MKR DOCD: CPT | Performed by: NURSE PRACTITIONER

## 2024-02-15 PROCEDURE — G8427 DOCREV CUR MEDS BY ELIG CLIN: HCPCS | Performed by: NURSE PRACTITIONER

## 2024-02-15 PROCEDURE — 83036 HEMOGLOBIN GLYCOSYLATED A1C: CPT | Performed by: NURSE PRACTITIONER

## 2024-02-15 PROCEDURE — 82044 UR ALBUMIN SEMIQUANTITATIVE: CPT | Performed by: NURSE PRACTITIONER

## 2024-02-15 PROCEDURE — G8400 PT W/DXA NO RESULTS DOC: HCPCS | Performed by: NURSE PRACTITIONER

## 2024-02-15 PROCEDURE — 3051F HG A1C>EQUAL 7.0%<8.0%: CPT | Performed by: NURSE PRACTITIONER

## 2024-02-15 PROCEDURE — 3017F COLORECTAL CA SCREEN DOC REV: CPT | Performed by: NURSE PRACTITIONER

## 2024-02-15 PROCEDURE — 99213 OFFICE O/P EST LOW 20 MIN: CPT | Performed by: NURSE PRACTITIONER

## 2024-02-15 PROCEDURE — G8484 FLU IMMUNIZE NO ADMIN: HCPCS | Performed by: NURSE PRACTITIONER

## 2024-02-15 PROCEDURE — 3077F SYST BP >= 140 MM HG: CPT | Performed by: NURSE PRACTITIONER

## 2024-02-15 PROCEDURE — 1090F PRES/ABSN URINE INCON ASSESS: CPT | Performed by: NURSE PRACTITIONER

## 2024-02-15 PROCEDURE — 2022F DILAT RTA XM EVC RTNOPTHY: CPT | Performed by: NURSE PRACTITIONER

## 2024-02-15 RX ORDER — AMLODIPINE BESYLATE 5 MG/1
5 TABLET ORAL DAILY
Qty: 30 TABLET | Refills: 1
Start: 2024-02-15

## 2024-02-15 NOTE — PROGRESS NOTES
[facility]  [facility] PRIMARY CARE  [facility] CARE [place] MAIN FLOOR  [facility] [address]  Dept: [phone]  Dept Fax: [phone]    Freida Amaro (:  1950) is a 73 y.o. female,Established patient, here for evaluation of the following chief complaint(s):  Diabetes and Hypertension (Patient is here today for a follow up on HTN and diabetes)    Freida Amaro is a 73-year-old female here today for routine follow-up for chronic additions.  He was seen in office 2 weeks ago with complaints of vertigo, at that time had an echocardiogram ordered due to newly recognized heart murmur.  Was also referred to neurology due to history of meningioma.        ASSESSMENT/PLAN:  1. Type 2 diabetes mellitus without complication, without long-term current use of insulin (HCC)  -     POCT glycosylated hemoglobin (Hb A1C)  -     POCT microalbumin  2. Essential hypertension  -     amLODIPine (NORVASC) 5 MG tablet; Take 1 tablet by mouth daily, Disp-30 tablet, R-1Adjust Sig  3. Influenza vaccination declined    No murmur appreciated today, reviewed results of echocardiogram which showed no abnormal structural issues to the heart or valves.  EF was greater than 65%.  A1c mildly elevated at 6.5, I encouraged patient to continue with her twice morning dosing and once in the afternoon as she recently has only been doing 1 pill twice    Return in about 6 months (around 8/15/2024) for AMW, Diabetes.         Subjective   SUBJECTIVE/OBJECTIVE:  Freida Amaro states she is here today for  blood pressure check.  Her vertigo symptoms have resolved since her visit 2 weeks ago    She is taking medications for blood pressure as prescribed by Dr. Odom, her cardiologist. BP was 131/85 this AM        Review of Systems       Objective     DIAGNOSTIC FINDINGS:  CBC:  Lab Results   Component Value Date/Time    WBC 6.59 2023 09:01 AM    WBC 4.3 10/31/2021 01:24 PM    HGB 13.2 2023 09:01 AM    PLT

## 2024-04-03 ENCOUNTER — TELEPHONE (OUTPATIENT)
Dept: PRIMARY CARE CLINIC | Age: 74
End: 2024-04-03

## 2024-04-03 DIAGNOSIS — M05.79 RHEUMATOID ARTHRITIS INVOLVING MULTIPLE SITES WITH POSITIVE RHEUMATOID FACTOR (HCC): Primary | ICD-10-CM

## 2024-04-03 RX ORDER — PREDNISONE 20 MG/1
20 TABLET ORAL 2 TIMES DAILY
Qty: 10 TABLET | Refills: 0 | Status: SHIPPED | OUTPATIENT
Start: 2024-04-03 | End: 2024-04-08

## 2024-04-03 NOTE — TELEPHONE ENCOUNTER
Patient called stating her left leg from knee to ankle aches. Called rheumatology  and hasn't heard back in 3 days . Wants to know if there is anything you can call in for pain. She tried tylenol and ibuprofen and not working she thinks its a flare up from arthritis . Its aches to th point she has to sleep in chair for comfort .

## 2024-04-03 NOTE — TELEPHONE ENCOUNTER
Please advise the patient that I sent in a prescription for prednisone 20 mg to be taken twice a day for 5 days to treat the flare.

## 2024-04-26 ENCOUNTER — TELEPHONE (OUTPATIENT)
Dept: PRIMARY CARE CLINIC | Age: 74
End: 2024-04-26

## 2024-04-26 NOTE — TELEPHONE ENCOUNTER
Patient called stating that she has Jury duty in May and would like for you to write a letter for her to be excused from Jury duty.  Patient states that her memory is not like it use to and doesn't feel comfortable to be part of a jury.       Patient doesn't remember the dates for her jury duty as her daughter has the jury duty letter.    Patient can  letter if the letter can't be mailed to her.       Please advise.

## 2024-04-26 NOTE — TELEPHONE ENCOUNTER
We can schedule her in a 30-minute slot for a cognitive evaluation.  Given there is no diagnosis of cognitive impairment on her chart I cannot provide the letter without doing a more in-depth exam

## 2024-04-29 ENCOUNTER — OFFICE VISIT (OUTPATIENT)
Dept: PRIMARY CARE CLINIC | Age: 74
End: 2024-04-29
Payer: MEDICARE

## 2024-04-29 VITALS
OXYGEN SATURATION: 98 % | BODY MASS INDEX: 25.98 KG/M2 | DIASTOLIC BLOOD PRESSURE: 89 MMHG | HEART RATE: 77 BPM | WEIGHT: 146.6 LBS | SYSTOLIC BLOOD PRESSURE: 144 MMHG

## 2024-04-29 DIAGNOSIS — I10 ESSENTIAL HYPERTENSION: ICD-10-CM

## 2024-04-29 DIAGNOSIS — D32.0 BENIGN NEOPLASM OF CEREBRAL MENINGES (HCC): ICD-10-CM

## 2024-04-29 DIAGNOSIS — Z78.0 POST-MENOPAUSAL: ICD-10-CM

## 2024-04-29 DIAGNOSIS — D32.9 MENINGIOMA (HCC): Primary | ICD-10-CM

## 2024-04-29 DIAGNOSIS — M06.9 RHEUMATOID ARTHRITIS, INVOLVING UNSPECIFIED SITE, UNSPECIFIED WHETHER RHEUMATOID FACTOR PRESENT (HCC): ICD-10-CM

## 2024-04-29 DIAGNOSIS — R41.89 COGNITIVE IMPAIRMENT: ICD-10-CM

## 2024-04-29 DIAGNOSIS — E11.9 TYPE 2 DIABETES MELLITUS WITHOUT COMPLICATION, WITHOUT LONG-TERM CURRENT USE OF INSULIN (HCC): ICD-10-CM

## 2024-04-29 LAB
FREE T4 REFLEX: YES
T4 FREE: 1.33 UG/DL
TSH SERPL DL<=0.05 MIU/L-ACNC: 0.46 MIU/L
VITAMIN B-12: 410 PG/ML
VITAMIN D 25-HYDROXY: 46.8 NG/ML

## 2024-04-29 PROCEDURE — 2022F DILAT RTA XM EVC RTNOPTHY: CPT | Performed by: NURSE PRACTITIONER

## 2024-04-29 PROCEDURE — 3017F COLORECTAL CA SCREEN DOC REV: CPT | Performed by: NURSE PRACTITIONER

## 2024-04-29 PROCEDURE — G8427 DOCREV CUR MEDS BY ELIG CLIN: HCPCS | Performed by: NURSE PRACTITIONER

## 2024-04-29 PROCEDURE — 99213 OFFICE O/P EST LOW 20 MIN: CPT | Performed by: NURSE PRACTITIONER

## 2024-04-29 PROCEDURE — G8417 CALC BMI ABV UP PARAM F/U: HCPCS | Performed by: NURSE PRACTITIONER

## 2024-04-29 PROCEDURE — 1123F ACP DISCUSS/DSCN MKR DOCD: CPT | Performed by: NURSE PRACTITIONER

## 2024-04-29 PROCEDURE — 3077F SYST BP >= 140 MM HG: CPT | Performed by: NURSE PRACTITIONER

## 2024-04-29 PROCEDURE — 3079F DIAST BP 80-89 MM HG: CPT | Performed by: NURSE PRACTITIONER

## 2024-04-29 PROCEDURE — 1090F PRES/ABSN URINE INCON ASSESS: CPT | Performed by: NURSE PRACTITIONER

## 2024-04-29 PROCEDURE — G8400 PT W/DXA NO RESULTS DOC: HCPCS | Performed by: NURSE PRACTITIONER

## 2024-04-29 PROCEDURE — 3051F HG A1C>EQUAL 7.0%<8.0%: CPT | Performed by: NURSE PRACTITIONER

## 2024-04-29 PROCEDURE — 1036F TOBACCO NON-USER: CPT | Performed by: NURSE PRACTITIONER

## 2024-04-29 SDOH — ECONOMIC STABILITY: INCOME INSECURITY: HOW HARD IS IT FOR YOU TO PAY FOR THE VERY BASICS LIKE FOOD, HOUSING, MEDICAL CARE, AND HEATING?: NOT HARD AT ALL

## 2024-04-29 SDOH — ECONOMIC STABILITY: FOOD INSECURITY: WITHIN THE PAST 12 MONTHS, THE FOOD YOU BOUGHT JUST DIDN'T LAST AND YOU DIDN'T HAVE MONEY TO GET MORE.: NEVER TRUE

## 2024-04-29 SDOH — ECONOMIC STABILITY: FOOD INSECURITY: WITHIN THE PAST 12 MONTHS, YOU WORRIED THAT YOUR FOOD WOULD RUN OUT BEFORE YOU GOT MONEY TO BUY MORE.: NEVER TRUE

## 2024-04-29 NOTE — PROGRESS NOTES
[facility]  [facility] PRIMARY CARE  [facility] MAIN FLOOR  [facility] [address]  Dept: [phone]  Dept Fax: [phone]    Freida Amaro (:  1950) is a 74 y.o. female,Established patient, here for evaluation of the following chief complaint(s):  Letter for School/Work (Letter for Jury duty due to memory)      Assessment & Plan   ASSESSMENT/PLAN:  1. Meningioma (HCC)  -     MRI BRAIN W WO CONTRAST; Future  2. Benign neoplasm of cerebral meninges (HCC)  -     MRI BRAIN W WO CONTRAST; Future  3. Cognitive impairment  -     MRI BRAIN W WO CONTRAST; Future  -     TSH With Reflex Ft4; Future  -     Vitamin B12; Future  -     Vitamin D 25 Hydroxy; Future  4. Type 2 diabetes mellitus without complication, without long-term current use of insulin (HCC)  -     TSH With Reflex Ft4; Future  -     Vitamin B12; Future  -     Vitamin D 25 Hydroxy; Future  5. Essential hypertension  -     TSH With Reflex Ft4; Future  -     Vitamin B12; Future  -     Vitamin D 25 Hydroxy; Future  6. Post-menopausal  -     TSH With Reflex Ft4; Future  -     Vitamin B12; Future  -     Vitamin D 25 Hydroxy; Future  7. Rheumatoid arthritis, involving unspecified site, unspecified whether rheumatoid factor present (HCC)    The patient did have an MRI of the brain in 2023.  It did show moderate chronic microvascular changes to the brain without any evidence of infarct.  Unchanged in .  There is no history of recent trauma.  We discussed the diagnosis of microvascular dementia.  Did agree to evaluate lab values to rule out any vitamin B12 deficiency or vitamin D deficiencies.  Will repeat MRI of the brain as she has had more acute changes compared to her last cognitive exam completed in the fall during her Medicare wellness visit.  Discussed oral medication such as Aricept or Namenda, upon reviewing possible GI side effects the patient does not wish to proceed at this time.    No

## 2024-05-07 ENCOUNTER — HOSPITAL ENCOUNTER (OUTPATIENT)
Dept: MRI IMAGING | Age: 74
Discharge: HOME | End: 2024-05-09
Payer: MEDICARE

## 2024-05-07 DIAGNOSIS — R41.89 COGNITIVE IMPAIRMENT: ICD-10-CM

## 2024-05-07 DIAGNOSIS — D32.9 MENINGIOMA (HCC): ICD-10-CM

## 2024-05-07 DIAGNOSIS — D32.0 BENIGN NEOPLASM OF CEREBRAL MENINGES (HCC): ICD-10-CM

## 2024-05-07 LAB
BUN BLD-MCNC: 16 MG/DL (ref 8–26)
EGFR, POC: 59 ML/MIN/1.73M2
POC CREATININE: 1 MG/DL (ref 0.51–1.19)

## 2024-05-07 PROCEDURE — A9576 INJ PROHANCE MULTIPACK: HCPCS | Performed by: NURSE PRACTITIONER

## 2024-05-07 PROCEDURE — 70553 MRI BRAIN STEM W/O & W/DYE: CPT

## 2024-05-07 PROCEDURE — 2580000003 HC RX 258: Performed by: NURSE PRACTITIONER

## 2024-05-07 PROCEDURE — 84520 ASSAY OF UREA NITROGEN: CPT

## 2024-05-07 PROCEDURE — 6360000004 HC RX CONTRAST MEDICATION: Performed by: NURSE PRACTITIONER

## 2024-05-07 PROCEDURE — 82565 ASSAY OF CREATININE: CPT

## 2024-05-07 RX ORDER — SODIUM CHLORIDE 0.9 % (FLUSH) 0.9 %
10 SYRINGE (ML) INJECTION PRN
Status: DISCONTINUED | OUTPATIENT
Start: 2024-05-07 | End: 2024-05-10 | Stop reason: HOSPADM

## 2024-05-07 RX ADMIN — GADOTERIDOL 12 ML: 279.3 INJECTION, SOLUTION INTRAVENOUS at 13:03

## 2024-05-07 RX ADMIN — SODIUM CHLORIDE, PRESERVATIVE FREE 10 ML: 5 INJECTION INTRAVENOUS at 13:04

## 2024-06-23 DIAGNOSIS — E11.9 TYPE 2 DIABETES MELLITUS WITHOUT COMPLICATION, WITHOUT LONG-TERM CURRENT USE OF INSULIN (HCC): ICD-10-CM

## 2024-06-23 DIAGNOSIS — Z76.0 MEDICATION REFILL: ICD-10-CM

## 2024-07-16 DIAGNOSIS — E11.9 TYPE 2 DIABETES MELLITUS WITHOUT COMPLICATION, WITHOUT LONG-TERM CURRENT USE OF INSULIN (HCC): ICD-10-CM

## 2024-07-16 RX ORDER — GLUCOSAM/CHON-MSM1/C/MANG/BOSW 500-416.6
TABLET ORAL
Qty: 100 EACH | Refills: 5 | Status: SHIPPED | OUTPATIENT
Start: 2024-07-16

## 2024-07-16 RX ORDER — CALCIUM CITRATE/VITAMIN D3 200MG-6.25
TABLET ORAL
Qty: 100 EACH | Refills: 2 | Status: SHIPPED | OUTPATIENT
Start: 2024-07-16

## 2024-08-06 ENCOUNTER — OFFICE VISIT (OUTPATIENT)
Dept: NEUROLOGY | Age: 74
End: 2024-08-06

## 2024-08-06 VITALS
HEART RATE: 72 BPM | DIASTOLIC BLOOD PRESSURE: 77 MMHG | HEIGHT: 62 IN | WEIGHT: 146 LBS | SYSTOLIC BLOOD PRESSURE: 129 MMHG | BODY MASS INDEX: 26.87 KG/M2

## 2024-08-06 DIAGNOSIS — Z92.3 HISTORY OF RADIATION THERAPY: ICD-10-CM

## 2024-08-06 DIAGNOSIS — D32.0 CEREBRAL MENINGIOMA (HCC): Primary | ICD-10-CM

## 2024-08-06 NOTE — PROGRESS NOTES
[facility] [address]  [place] [id number]  P: [phone]  F: [phone]    NEUROLOGY CLINIC NOTE     PATIENT NAME: Freida Amaro  PATIENT MRN: [id number]  PRIMARY CARE PHYSICIAN: Eladia Collins, APRN - CNP    HPI:      Freida Amaro is a 74 y.o. right handed  female with PMH significant for benign meningioma. The meningioma was first demonstrated on Head CT w/c on 10/29/2011 which was obtained due to tinnitus and dizziness, measuring approximately 1.4 cm transverse x 1.6 cm AP with homogenous enhancement. Since then she has had multiple MRIs all demonstrating a stable 1.5 x 1.3 cm extra axial lesion adjecent to the superolateral aspect of the right cavernous sinus - 6/2012, 12/2012, 5/2013, 5/2014, 10/2015, 10/2016, 4/2017, 10/2017, 9/2018, 10/2019, 10/2020, 6/2023 and most recently 5/2024 which redemonstrated a heterogeneously enhancing, partially calcified subdural mass along the right sphenoid wing measuring 1.8 x 1.6 x 1.3 cm. Most imaging done through [facility], unable to access images for review.   On exam, patient is Aox3, non focal neuro exam. Visual fields intact, denies headache.   Due to stability of lesion on serial MRIs over 10 years, there is no need for further monitoring with neuroimaging. Will request images to be uploaded to pacs  for review.      PATIENT HISTORY:     Past Medical History:   Diagnosis Date    DJD (degenerative joint disease) of cervical spine     Hyperlipidemia     Hypertension     Meningioma, cerebral (HCC)     benign, did undergo radiation    Obesity     Sleep disorder     Type 2 diabetes mellitus without complication, without long-term current use of insulin (HCC) 09/29/2016        Past Surgical History:   Procedure Laterality Date    HYSTERECTOMY (CERVIX STATUS UNKNOWN)          Social History     Socioeconomic History    Marital status:      Spouse name: Not on file    Number of

## 2024-08-16 ENCOUNTER — OFFICE VISIT (OUTPATIENT)
Dept: PRIMARY CARE CLINIC | Age: 74
End: 2024-08-16
Payer: MEDICARE

## 2024-08-16 VITALS
HEART RATE: 57 BPM | SYSTOLIC BLOOD PRESSURE: 129 MMHG | WEIGHT: 146.6 LBS | TEMPERATURE: 97.5 F | BODY MASS INDEX: 26.98 KG/M2 | DIASTOLIC BLOOD PRESSURE: 76 MMHG | OXYGEN SATURATION: 100 % | HEIGHT: 62 IN

## 2024-08-16 DIAGNOSIS — R41.89 COGNITIVE IMPAIRMENT: ICD-10-CM

## 2024-08-16 DIAGNOSIS — Z00.00 MEDICARE ANNUAL WELLNESS VISIT, SUBSEQUENT: Primary | ICD-10-CM

## 2024-08-16 DIAGNOSIS — I10 ESSENTIAL HYPERTENSION: ICD-10-CM

## 2024-08-16 DIAGNOSIS — M05.79 RHEUMATOID ARTHRITIS INVOLVING MULTIPLE SITES WITH POSITIVE RHEUMATOID FACTOR (HCC): ICD-10-CM

## 2024-08-16 DIAGNOSIS — D32.9 MENINGIOMA (HCC): ICD-10-CM

## 2024-08-16 DIAGNOSIS — Z12.31 SCREENING MAMMOGRAM FOR BREAST CANCER: ICD-10-CM

## 2024-08-16 DIAGNOSIS — E11.9 TYPE 2 DIABETES MELLITUS WITHOUT COMPLICATION, WITHOUT LONG-TERM CURRENT USE OF INSULIN (HCC): ICD-10-CM

## 2024-08-16 PROCEDURE — 1123F ACP DISCUSS/DSCN MKR DOCD: CPT | Performed by: NURSE PRACTITIONER

## 2024-08-16 PROCEDURE — 3078F DIAST BP <80 MM HG: CPT | Performed by: NURSE PRACTITIONER

## 2024-08-16 PROCEDURE — 2022F DILAT RTA XM EVC RTNOPTHY: CPT | Performed by: NURSE PRACTITIONER

## 2024-08-16 PROCEDURE — G0439 PPPS, SUBSEQ VISIT: HCPCS | Performed by: NURSE PRACTITIONER

## 2024-08-16 PROCEDURE — 1090F PRES/ABSN URINE INCON ASSESS: CPT | Performed by: NURSE PRACTITIONER

## 2024-08-16 PROCEDURE — 3051F HG A1C>EQUAL 7.0%<8.0%: CPT | Performed by: NURSE PRACTITIONER

## 2024-08-16 PROCEDURE — 1036F TOBACCO NON-USER: CPT | Performed by: NURSE PRACTITIONER

## 2024-08-16 PROCEDURE — 99213 OFFICE O/P EST LOW 20 MIN: CPT | Performed by: NURSE PRACTITIONER

## 2024-08-16 PROCEDURE — 3017F COLORECTAL CA SCREEN DOC REV: CPT | Performed by: NURSE PRACTITIONER

## 2024-08-16 PROCEDURE — G8417 CALC BMI ABV UP PARAM F/U: HCPCS | Performed by: NURSE PRACTITIONER

## 2024-08-16 PROCEDURE — G8427 DOCREV CUR MEDS BY ELIG CLIN: HCPCS | Performed by: NURSE PRACTITIONER

## 2024-08-16 PROCEDURE — 3074F SYST BP LT 130 MM HG: CPT | Performed by: NURSE PRACTITIONER

## 2024-08-16 PROCEDURE — G8400 PT W/DXA NO RESULTS DOC: HCPCS | Performed by: NURSE PRACTITIONER

## 2024-08-16 ASSESSMENT — PATIENT HEALTH QUESTIONNAIRE - PHQ9
SUM OF ALL RESPONSES TO PHQ QUESTIONS 1-9: 0
SUM OF ALL RESPONSES TO PHQ QUESTIONS 1-9: 0
2. FEELING DOWN, DEPRESSED OR HOPELESS: NOT AT ALL
SUM OF ALL RESPONSES TO PHQ QUESTIONS 1-9: 0
SUM OF ALL RESPONSES TO PHQ9 QUESTIONS 1 & 2: 0
1. LITTLE INTEREST OR PLEASURE IN DOING THINGS: NOT AT ALL
SUM OF ALL RESPONSES TO PHQ QUESTIONS 1-9: 0

## 2024-08-16 ASSESSMENT — LIFESTYLE VARIABLES
HOW OFTEN DO YOU HAVE A DRINK CONTAINING ALCOHOL: NEVER
HOW MANY STANDARD DRINKS CONTAINING ALCOHOL DO YOU HAVE ON A TYPICAL DAY: PATIENT DOES NOT DRINK
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
## 2024-09-09 LAB
ALBUMIN: 3.6 G/DL
ALK PHOSPHATASE: 75 U/L
ALT SERPL-CCNC: 17 U/L
AST SERPL-CCNC: 21 U/L
BILIRUB SERPL-MCNC: 0.9 MG/DL
BUN BLDV-MCNC: 15 MG/DL
CALCIUM SERPL-MCNC: 9.2 MG/DL
CHLORIDE BLD-SCNC: 102 MMOL/L
CHOLESTEROL, TOTAL: 179 MG/DL
CHOLESTEROL/HDL RATIO: 2.8
CO2: 30 MMOL/L
CPK: 50 U/L
CREAT SERPL-MCNC: 0.82 MG/DL
GFR AFRICAN AMERICAN: 82.7 ML/M1.7
GFR NON-AFRICAN AMERICAN: 68.3 ML/M1.7
GLUCOSE: 133 MG/DL
HDLC SERPL-MCNC: 65 MG/DL
LDL CHOLESTEROL: 78 MG/DL
POTASSIUM SERPL-SCNC: 3.6 MMOL/L
SODIUM BLD-SCNC: 137 MMOL/L
TOTAL PROTEIN: 6.2 G/DL
TRIGL SERPL-MCNC: 178 MG/DL
VLDLC SERPL CALC-MCNC: 36 MG/DL

## 2024-09-21 ENCOUNTER — HOSPITAL ENCOUNTER (EMERGENCY)
Age: 74
Discharge: HOME | End: 2024-09-21
Attending: EMERGENCY MEDICINE
Payer: MEDICARE

## 2024-09-21 VITALS
TEMPERATURE: 98.2 F | OXYGEN SATURATION: 96 % | WEIGHT: 144 LBS | DIASTOLIC BLOOD PRESSURE: 79 MMHG | RESPIRATION RATE: 14 BRPM | SYSTOLIC BLOOD PRESSURE: 169 MMHG | HEIGHT: 63 IN | HEART RATE: 85 BPM | BODY MASS INDEX: 25.52 KG/M2

## 2024-09-21 DIAGNOSIS — J06.9 VIRAL URI WITH COUGH: Primary | ICD-10-CM

## 2024-09-21 PROCEDURE — 99283 EMERGENCY DEPT VISIT LOW MDM: CPT

## 2024-09-21 PROCEDURE — 87636 SARSCOV2 & INF A&B AMP PRB: CPT

## 2024-09-21 PROCEDURE — 2500000003 HC RX 250 WO HCPCS: Performed by: EMERGENCY MEDICINE

## 2024-09-21 PROCEDURE — 87651 STREP A DNA AMP PROBE: CPT

## 2024-09-21 RX ORDER — GUAIFENESIN/DEXTROMETHORPHAN 100-10MG/5
5 SYRUP ORAL 3 TIMES DAILY PRN
Qty: 120 ML | Refills: 0 | Status: SHIPPED | OUTPATIENT
Start: 2024-09-21 | End: 2024-10-01

## 2024-09-21 RX ORDER — GUAIFENESIN 200 MG/10ML
200 LIQUID ORAL ONCE
Status: COMPLETED | OUTPATIENT
Start: 2024-09-21 | End: 2024-09-21

## 2024-09-21 RX ADMIN — GUAIFENESIN 200 MG: 200 SOLUTION ORAL at 08:06

## 2024-09-21 ASSESSMENT — ENCOUNTER SYMPTOMS
EYE PAIN: 0
SHORTNESS OF BREATH: 0
ABDOMINAL PAIN: 0
BACK PAIN: 0
CHEST TIGHTNESS: 0
EYE DISCHARGE: 0
FACIAL SWELLING: 0
ABDOMINAL DISTENTION: 0

## 2024-09-21 ASSESSMENT — PAIN - FUNCTIONAL ASSESSMENT: PAIN_FUNCTIONAL_ASSESSMENT: 0-10

## 2024-09-21 ASSESSMENT — PAIN DESCRIPTION - LOCATION: LOCATION: THROAT

## 2024-09-21 ASSESSMENT — PAIN SCALES - GENERAL: PAINLEVEL_OUTOF10: 4

## 2024-09-21 ASSESSMENT — PAIN DESCRIPTION - DESCRIPTORS: DESCRIPTORS: SORE

## 2024-10-01 DIAGNOSIS — Z12.31 SCREENING MAMMOGRAM FOR BREAST CANCER: ICD-10-CM

## 2024-11-19 ENCOUNTER — OFFICE VISIT (OUTPATIENT)
Dept: PRIMARY CARE CLINIC | Age: 74
End: 2024-11-19
Payer: MEDICARE

## 2024-11-19 VITALS
OXYGEN SATURATION: 98 % | HEART RATE: 61 BPM | DIASTOLIC BLOOD PRESSURE: 82 MMHG | WEIGHT: 146 LBS | SYSTOLIC BLOOD PRESSURE: 166 MMHG | TEMPERATURE: 97.2 F | BODY MASS INDEX: 25.86 KG/M2

## 2024-11-19 DIAGNOSIS — E11.9 TYPE 2 DIABETES MELLITUS WITHOUT COMPLICATION, WITHOUT LONG-TERM CURRENT USE OF INSULIN (HCC): Primary | ICD-10-CM

## 2024-11-19 DIAGNOSIS — I10 ESSENTIAL HYPERTENSION: ICD-10-CM

## 2024-11-19 LAB — HBA1C MFR BLD: 7.9 %

## 2024-11-19 PROCEDURE — 1159F MED LIST DOCD IN RCRD: CPT | Performed by: NURSE PRACTITIONER

## 2024-11-19 PROCEDURE — 99214 OFFICE O/P EST MOD 30 MIN: CPT | Performed by: NURSE PRACTITIONER

## 2024-11-19 PROCEDURE — 1090F PRES/ABSN URINE INCON ASSESS: CPT | Performed by: NURSE PRACTITIONER

## 2024-11-19 PROCEDURE — 2022F DILAT RTA XM EVC RTNOPTHY: CPT | Performed by: NURSE PRACTITIONER

## 2024-11-19 PROCEDURE — 3051F HG A1C>EQUAL 7.0%<8.0%: CPT | Performed by: NURSE PRACTITIONER

## 2024-11-19 PROCEDURE — 1036F TOBACCO NON-USER: CPT | Performed by: NURSE PRACTITIONER

## 2024-11-19 PROCEDURE — 3079F DIAST BP 80-89 MM HG: CPT | Performed by: NURSE PRACTITIONER

## 2024-11-19 PROCEDURE — 83036 HEMOGLOBIN GLYCOSYLATED A1C: CPT | Performed by: NURSE PRACTITIONER

## 2024-11-19 PROCEDURE — G8400 PT W/DXA NO RESULTS DOC: HCPCS | Performed by: NURSE PRACTITIONER

## 2024-11-19 PROCEDURE — G8417 CALC BMI ABV UP PARAM F/U: HCPCS | Performed by: NURSE PRACTITIONER

## 2024-11-19 PROCEDURE — 3077F SYST BP >= 140 MM HG: CPT | Performed by: NURSE PRACTITIONER

## 2024-11-19 PROCEDURE — G8427 DOCREV CUR MEDS BY ELIG CLIN: HCPCS | Performed by: NURSE PRACTITIONER

## 2024-11-19 PROCEDURE — 3017F COLORECTAL CA SCREEN DOC REV: CPT | Performed by: NURSE PRACTITIONER

## 2024-11-19 PROCEDURE — G8484 FLU IMMUNIZE NO ADMIN: HCPCS | Performed by: NURSE PRACTITIONER

## 2024-11-19 PROCEDURE — 1123F ACP DISCUSS/DSCN MKR DOCD: CPT | Performed by: NURSE PRACTITIONER

## 2024-11-19 RX ORDER — LISINOPRIL 40 MG/1
40 TABLET ORAL DAILY
Qty: 90 TABLET | Refills: 3 | Status: SHIPPED | OUTPATIENT
Start: 2024-11-19

## 2024-11-19 RX ORDER — ATORVASTATIN CALCIUM 20 MG/1
20 TABLET, FILM COATED ORAL DAILY
COMMUNITY
Start: 2024-10-16 | End: 2025-11-19

## 2024-11-19 RX ORDER — HYDROCHLOROTHIAZIDE 25 MG/1
25 TABLET ORAL DAILY
Qty: 90 TABLET | Refills: 1 | Status: SHIPPED | OUTPATIENT
Start: 2024-11-19 | End: 2025-05-18

## 2024-11-19 RX ORDER — AMLODIPINE BESYLATE 5 MG/1
5 TABLET ORAL DAILY
Qty: 90 TABLET | Refills: 1 | Status: SHIPPED | OUTPATIENT
Start: 2024-11-19

## 2024-11-19 RX ORDER — METFORMIN HYDROCHLORIDE 500 MG/1
TABLET, EXTENDED RELEASE ORAL
Qty: 540 TABLET | Refills: 0 | Status: SHIPPED | OUTPATIENT
Start: 2024-11-19 | End: 2025-05-18

## 2024-11-19 NOTE — PROGRESS NOTES
Freida Amaro (:  1950) is a 74 y.o. female,Established patient, here for evaluation of the following chief complaint(s):  Hypertension and Diabetes    Freida Aamro is a 74-year-old female here today for routine follow-up, last seen on 2024 for Medicare wellness visit.  She is history of cerebral Russell, and is established with neurology.  She diabetic without long-term insulin use and hypertension.  Mammogram was completed on 2024       Assessment & Plan  Type 2 diabetes mellitus without complication, without long-term current use of insulin (HCC)   Chronic, not at goal (unstable), switch to extended release metformin due to increased bowel frequency.  Will also add once daily Januvia for improved diabetic control as A1c is over 7.5 at this time    Orders:    POCT glycosylated hemoglobin (Hb A1C)    metFORMIN (GLUCOPHAGE-XR) 500 MG extended release tablet; Take 2 tablets by mouth daily (with breakfast) AND 1 tablet daily (before dinner).    SITagliptin (JANUVIA) 50 MG tablet; Take 1 tablet by mouth daily    Essential hypertension    At goal with home readings, at goal at last visit.  No changes made to blood pressure today    Orders:    amLODIPine (NORVASC) 5 MG tablet; Take 1 tablet by mouth daily    hydroCHLOROthiazide (HYDRODIURIL) 25 MG tablet; Take 1 tablet by mouth daily    lisinopril (PRINIVIL;ZESTRIL) 40 MG tablet; Take 1 tablet by mouth daily      Return in about 4 months (around 3/19/2025) for Diabetes.       Subjective   Check BP at home 130 to 140 SBP. Does not drop low, rarely below 120 and never 160 range.  No issues with low suagrs never below 70.  No ADR's to report    She says she is taking metformin with 2 pills in the morning and 1 pill in the evening, however she does struggle with bowel frequency and urgency.  If she has a morning appointment she will wait until she is home until 11:00 to take her first dose of metformin        Review of Systems       Objective   Physical

## 2024-11-19 NOTE — ASSESSMENT & PLAN NOTE
Chronic, not at goal (unstable), switch to extended release metformin due to increased bowel frequency.  Will also add once daily Januvia for improved diabetic control as A1c is over 7.5 at this time    Orders:    POCT glycosylated hemoglobin (Hb A1C)    metFORMIN (GLUCOPHAGE-XR) 500 MG extended release tablet; Take 2 tablets by mouth daily (with breakfast) AND 1 tablet daily (before dinner).    SITagliptin (JANUVIA) 50 MG tablet; Take 1 tablet by mouth daily

## 2024-11-19 NOTE — ASSESSMENT & PLAN NOTE
At goal with home readings, at goal at last visit.  No changes made to blood pressure today    Orders:    amLODIPine (NORVASC) 5 MG tablet; Take 1 tablet by mouth daily    hydroCHLOROthiazide (HYDRODIURIL) 25 MG tablet; Take 1 tablet by mouth daily    lisinopril (PRINIVIL;ZESTRIL) 40 MG tablet; Take 1 tablet by mouth daily

## 2025-03-17 LAB
ALBUMIN: 4.1 G/DL
ALK PHOSPHATASE: 72 U/L
ALT SERPL-CCNC: 19 U/L
AST SERPL-CCNC: 25 U/L
BILIRUB SERPL-MCNC: 0.8 MG/DL
BUN BLDV-MCNC: 19 MG/DL
CALCIUM SERPL-MCNC: 9.6 MG/DL
CHLORIDE BLD-SCNC: 105 MMOL/L
CHOLESTEROL, TOTAL: 180 MG/DL
CHOLESTEROL/HDL RATIO: 3.8
CO2: 32 MMOL/L
CPK: 36 U/L
CREAT SERPL-MCNC: 0.8 MG/DL
EGFR (CKD-EPI): 77.3 ML/M1.7
GLUCOSE: 131 MG/DL
HDLC SERPL-MCNC: 47 MG/DL
LDL CHOLESTEROL: 99 MG/DL
POTASSIUM SERPL-SCNC: 3.8 MMOL/L
SODIUM BLD-SCNC: 139 MMOL/L
TOTAL PROTEIN: 7.1 G/DL
TRIGL SERPL-MCNC: 168 MG/DL
VLDLC SERPL CALC-MCNC: 34 MG/DL

## 2025-03-19 ENCOUNTER — OFFICE VISIT (OUTPATIENT)
Dept: PRIMARY CARE CLINIC | Age: 75
End: 2025-03-19
Payer: MEDICARE

## 2025-03-19 VITALS
DIASTOLIC BLOOD PRESSURE: 74 MMHG | TEMPERATURE: 97.3 F | OXYGEN SATURATION: 98 % | HEART RATE: 71 BPM | SYSTOLIC BLOOD PRESSURE: 138 MMHG

## 2025-03-19 DIAGNOSIS — M05.79 RHEUMATOID ARTHRITIS INVOLVING MULTIPLE SITES WITH POSITIVE RHEUMATOID FACTOR (HCC): ICD-10-CM

## 2025-03-19 DIAGNOSIS — Z12.31 BREAST CANCER SCREENING BY MAMMOGRAM: ICD-10-CM

## 2025-03-19 DIAGNOSIS — E11.9 TYPE 2 DIABETES MELLITUS WITHOUT COMPLICATION, WITHOUT LONG-TERM CURRENT USE OF INSULIN: ICD-10-CM

## 2025-03-19 DIAGNOSIS — I10 ESSENTIAL HYPERTENSION: Primary | ICD-10-CM

## 2025-03-19 DIAGNOSIS — D32.9 MENINGIOMA (HCC): ICD-10-CM

## 2025-03-19 LAB — HBA1C MFR BLD: 7.1 %

## 2025-03-19 PROCEDURE — 1090F PRES/ABSN URINE INCON ASSESS: CPT | Performed by: NURSE PRACTITIONER

## 2025-03-19 PROCEDURE — G8400 PT W/DXA NO RESULTS DOC: HCPCS | Performed by: NURSE PRACTITIONER

## 2025-03-19 PROCEDURE — G8417 CALC BMI ABV UP PARAM F/U: HCPCS | Performed by: NURSE PRACTITIONER

## 2025-03-19 PROCEDURE — 3051F HG A1C>EQUAL 7.0%<8.0%: CPT | Performed by: NURSE PRACTITIONER

## 2025-03-19 PROCEDURE — 1036F TOBACCO NON-USER: CPT | Performed by: NURSE PRACTITIONER

## 2025-03-19 PROCEDURE — 3075F SYST BP GE 130 - 139MM HG: CPT | Performed by: NURSE PRACTITIONER

## 2025-03-19 PROCEDURE — 99213 OFFICE O/P EST LOW 20 MIN: CPT | Performed by: NURSE PRACTITIONER

## 2025-03-19 PROCEDURE — 1159F MED LIST DOCD IN RCRD: CPT | Performed by: NURSE PRACTITIONER

## 2025-03-19 PROCEDURE — 3017F COLORECTAL CA SCREEN DOC REV: CPT | Performed by: NURSE PRACTITIONER

## 2025-03-19 PROCEDURE — 83036 HEMOGLOBIN GLYCOSYLATED A1C: CPT | Performed by: NURSE PRACTITIONER

## 2025-03-19 PROCEDURE — G8427 DOCREV CUR MEDS BY ELIG CLIN: HCPCS | Performed by: NURSE PRACTITIONER

## 2025-03-19 PROCEDURE — 2022F DILAT RTA XM EVC RTNOPTHY: CPT | Performed by: NURSE PRACTITIONER

## 2025-03-19 PROCEDURE — 1123F ACP DISCUSS/DSCN MKR DOCD: CPT | Performed by: NURSE PRACTITIONER

## 2025-03-19 PROCEDURE — 3078F DIAST BP <80 MM HG: CPT | Performed by: NURSE PRACTITIONER

## 2025-03-19 SDOH — ECONOMIC STABILITY: FOOD INSECURITY: WITHIN THE PAST 12 MONTHS, THE FOOD YOU BOUGHT JUST DIDN'T LAST AND YOU DIDN'T HAVE MONEY TO GET MORE.: NEVER TRUE

## 2025-03-19 SDOH — ECONOMIC STABILITY: FOOD INSECURITY: WITHIN THE PAST 12 MONTHS, YOU WORRIED THAT YOUR FOOD WOULD RUN OUT BEFORE YOU GOT MONEY TO BUY MORE.: NEVER TRUE

## 2025-03-19 ASSESSMENT — PATIENT HEALTH QUESTIONNAIRE - PHQ9
2. FEELING DOWN, DEPRESSED OR HOPELESS: NOT AT ALL
SUM OF ALL RESPONSES TO PHQ QUESTIONS 1-9: 0
1. LITTLE INTEREST OR PLEASURE IN DOING THINGS: NOT AT ALL
SUM OF ALL RESPONSES TO PHQ QUESTIONS 1-9: 0

## 2025-03-19 NOTE — PROGRESS NOTES
age 60 yrs+ (1 - 1-dose 75+ series) 03/02/2026    Lipids  03/17/2026    GFR test (Diabetes, CKD 3-4, OR last GFR 15-59)  03/17/2026    A1C test (Diabetic or Prediabetic)  03/19/2026    Colorectal Cancer Screen  08/27/2029    DEXA (modify frequency per FRAX score)  Completed    Pneumococcal 50+ years Vaccine  Completed    Hepatitis C screen  Addressed    Hepatitis A vaccine  Aged Out    Hepatitis B vaccine  Aged Out    Hib vaccine  Aged Out    Polio vaccine  Aged Out    Meningococcal (ACWY) vaccine  Aged Out    Meningococcal B vaccine  Aged Out       Subjective:      Review of Systems       Objective:     Physical Exam  Vitals and nursing note reviewed.   Constitutional:       General: She is not in acute distress.     Appearance: She is well-developed.   HENT:      Head: Normocephalic and atraumatic.   Eyes:      General: No scleral icterus.     Pupils: Pupils are equal, round, and reactive to light.   Cardiovascular:      Rate and Rhythm: Normal rate and regular rhythm.      Heart sounds: Murmur heard.   Pulmonary:      Effort: Pulmonary effort is normal.      Breath sounds: Normal breath sounds.   Abdominal:      Palpations: Abdomen is soft.   Musculoskeletal:      Cervical back: Normal range of motion and neck supple.   Skin:     General: Skin is warm and dry.   Neurological:      Mental Status: She is alert and oriented to person, place, and time.      Cranial Nerves: No cranial nerve deficit or facial asymmetry.      Motor: No tremor.      Coordination: Coordination normal. Finger-Nose-Finger Test normal.      Gait: Gait is intact.   Psychiatric:         Behavior: Behavior normal. Behavior is cooperative.         Thought Content: Thought content normal.         Judgment: Judgment normal.           Assessment/Plan:      1. Essential hypertension  2. Rheumatoid arthritis involving multiple sites with positive rheumatoid factor (HCC)  3. Meningioma (HCC)  4. Type 2 diabetes mellitus without complication, without

## 2025-03-29 DIAGNOSIS — E11.9 TYPE 2 DIABETES MELLITUS WITHOUT COMPLICATION, WITHOUT LONG-TERM CURRENT USE OF INSULIN: ICD-10-CM

## 2025-03-31 RX ORDER — METFORMIN HYDROCHLORIDE 500 MG/1
TABLET, EXTENDED RELEASE ORAL
Qty: 270 TABLET | Refills: 1 | Status: SHIPPED | OUTPATIENT
Start: 2025-03-31 | End: 2026-03-31

## 2025-05-05 DIAGNOSIS — I10 ESSENTIAL HYPERTENSION: ICD-10-CM

## 2025-05-06 RX ORDER — AMLODIPINE BESYLATE 5 MG/1
5 TABLET ORAL DAILY
Qty: 90 TABLET | Refills: 3 | Status: SHIPPED | OUTPATIENT
Start: 2025-05-06

## 2025-05-06 RX ORDER — HYDROCHLOROTHIAZIDE 25 MG/1
25 TABLET ORAL DAILY
Qty: 90 TABLET | Refills: 3 | Status: SHIPPED | OUTPATIENT
Start: 2025-05-06 | End: 2025-11-02

## 2025-05-28 ENCOUNTER — OFFICE VISIT (OUTPATIENT)
Dept: PRIMARY CARE CLINIC | Age: 75
End: 2025-05-28
Payer: MEDICARE

## 2025-05-28 ENCOUNTER — HOSPITAL ENCOUNTER (OUTPATIENT)
Dept: GENERAL RADIOLOGY | Age: 75
Discharge: HOME | End: 2025-05-30
Payer: MEDICARE

## 2025-05-28 VITALS
SYSTOLIC BLOOD PRESSURE: 131 MMHG | HEART RATE: 79 BPM | TEMPERATURE: 97 F | OXYGEN SATURATION: 100 % | HEIGHT: 63 IN | DIASTOLIC BLOOD PRESSURE: 67 MMHG | WEIGHT: 142 LBS | BODY MASS INDEX: 25.16 KG/M2

## 2025-05-28 DIAGNOSIS — M25.571 ACUTE RIGHT ANKLE PAIN: ICD-10-CM

## 2025-05-28 DIAGNOSIS — M25.571 ACUTE RIGHT ANKLE PAIN: Primary | ICD-10-CM

## 2025-05-28 DIAGNOSIS — M05.79 RHEUMATOID ARTHRITIS INVOLVING MULTIPLE SITES WITH POSITIVE RHEUMATOID FACTOR (HCC): ICD-10-CM

## 2025-05-28 PROCEDURE — 1090F PRES/ABSN URINE INCON ASSESS: CPT | Performed by: NURSE PRACTITIONER

## 2025-05-28 PROCEDURE — 1036F TOBACCO NON-USER: CPT | Performed by: NURSE PRACTITIONER

## 2025-05-28 PROCEDURE — 3075F SYST BP GE 130 - 139MM HG: CPT | Performed by: NURSE PRACTITIONER

## 2025-05-28 PROCEDURE — G8427 DOCREV CUR MEDS BY ELIG CLIN: HCPCS | Performed by: NURSE PRACTITIONER

## 2025-05-28 PROCEDURE — 73610 X-RAY EXAM OF ANKLE: CPT

## 2025-05-28 PROCEDURE — G8400 PT W/DXA NO RESULTS DOC: HCPCS | Performed by: NURSE PRACTITIONER

## 2025-05-28 PROCEDURE — 99213 OFFICE O/P EST LOW 20 MIN: CPT | Performed by: NURSE PRACTITIONER

## 2025-05-28 PROCEDURE — 1123F ACP DISCUSS/DSCN MKR DOCD: CPT | Performed by: NURSE PRACTITIONER

## 2025-05-28 PROCEDURE — 1159F MED LIST DOCD IN RCRD: CPT | Performed by: NURSE PRACTITIONER

## 2025-05-28 PROCEDURE — 3078F DIAST BP <80 MM HG: CPT | Performed by: NURSE PRACTITIONER

## 2025-05-28 PROCEDURE — 3017F COLORECTAL CA SCREEN DOC REV: CPT | Performed by: NURSE PRACTITIONER

## 2025-05-28 PROCEDURE — G8417 CALC BMI ABV UP PARAM F/U: HCPCS | Performed by: NURSE PRACTITIONER

## 2025-05-28 RX ORDER — PREDNISONE 20 MG/1
20 TABLET ORAL DAILY
Qty: 5 TABLET | Refills: 0 | Status: SHIPPED | OUTPATIENT
Start: 2025-05-28 | End: 2025-06-02

## 2025-05-28 NOTE — PROGRESS NOTES
[address] [facility] CARE [place] MAIN FLOOR  [facility] [address]   5/28/2025    Freida Amrao is a 75 y.o. female who presents today for her medical conditions and/or complaints as noted below.    Freida Amaro is scheduled today for bilateral calf pain  .      HPI:     History of Present Illness  The patient is a 75-year-old female who presents today with complaints of bilateral calf pain.    She reports experiencing bilateral calf pain for the past 2 months, which has been partially alleviated by Tylenol. The pain is primarily localized to the medial aspect of her ankles and does not radiate upwards. Additionally, she reports knee pain in both knees. She has an appointment scheduled with her rheumatologist tomorrow but is seeking immediate relief as she was unable to stand upon waking yesterday. The pain intensifies during ambulation. She has not been massaging the affected area.     She reports no fevers or chills but mentions feeling unwell 2 days ago, which resulted in loss of appetite and missed medication doses. She has been assisting her , who is unable to walk independently, for the past 3 weeks, which may have contributed to her symptoms. She reports no shortness of breath or chest pain. She has been managing her rheumatoid arthritis with prednisone 5 mg.      Vitals:    05/28/25 1406   BP: 131/67   BP Site: Right Upper Arm   Patient Position: Sitting   BP Cuff Size: Large Adult   Pulse: 79   Temp: 97 °F (36.1 °C)   TempSrc: Temporal   SpO2: 100%   Weight: 64.4 kg (142 lb)   Height: 1.6 m (5' 3\")      Past Medical History:   Diagnosis Date    DJD (degenerative joint disease) of cervical spine     Hyperlipidemia     Hypertension     Meningioma, cerebral (HCC)     benign, did undergo radiation    Obesity     Sleep disorder     Type 2 diabetes mellitus without complication, without long-term current use of insulin (HCC) 09/29/2016      Past Surgical History:   Procedure Laterality Date

## 2025-06-02 ENCOUNTER — RESULTS FOLLOW-UP (OUTPATIENT)
Dept: PRIMARY CARE CLINIC | Age: 75
End: 2025-06-02

## 2025-06-03 ENCOUNTER — OFFICE VISIT (OUTPATIENT)
Dept: NEUROLOGY | Age: 75
End: 2025-06-03

## 2025-06-03 VITALS
HEART RATE: 77 BPM | HEIGHT: 63 IN | OXYGEN SATURATION: 97 % | BODY MASS INDEX: 24.95 KG/M2 | DIASTOLIC BLOOD PRESSURE: 77 MMHG | WEIGHT: 140.8 LBS | SYSTOLIC BLOOD PRESSURE: 140 MMHG

## 2025-06-03 DIAGNOSIS — D32.0 CEREBRAL MENINGIOMA (HCC): Primary | ICD-10-CM

## 2025-06-03 NOTE — PROGRESS NOTES
American female with PMH significant for benign meningioma. She presents for an annual follow up appointment today. The meningioma was first demonstrated on Head CT w/c on 10/29/2011. Since then she has had multiple MRIs all demonstrating a stable 1.5 x 1.3 cm extra axial lesion adjecent to the superolateral aspect of the right cavernous sinus - 6/2012, 12/2012, 5/2013, 5/2014, 10/2015, 10/2016, 4/2017, 10/2017, 9/2018, 10/2019, 10/2020, 6/2023 and most recently 5/2024 which redemonstrated a heterogeneously enhancing, partially calcified subdural mass along the right sphenoid wing measuring 1.8 x 1.6 x 1.3 cm. She denies any headaches or any other complaints. Aox3 with a non focal neuro exam.       PLAN:       - Can repeat MRI in a few years but no need for serial annual imaging at this time  - Can follow up in 1 year or as needed  - Instructed to call 911 if experiences sudden onset neuro deficits, LOC or any seizures      Follow up in the clinic in 1 year or as needed  Instructed patient to call the clinic if symptoms worsen or develop any new symptoms.       Ms. Amaro received counseling on the following healthy behaviors: medical compliance, smoking cessation, blood pressure control, regular follow up with primary doctor.      I have spent 60 minutes face to face with the patient and more than 50% of this time was spent counseling and coordinating care.      Electronically signed by Elsy Hutton MD on 6/3/2025 at 1:29 PM

## 2025-07-30 DIAGNOSIS — E11.9 TYPE 2 DIABETES MELLITUS WITHOUT COMPLICATION, WITHOUT LONG-TERM CURRENT USE OF INSULIN (HCC): ICD-10-CM

## 2025-07-30 RX ORDER — CALCIUM CITRATE/VITAMIN D3 200MG-6.25
TABLET ORAL
Qty: 100 EACH | Refills: 1 | Status: SHIPPED | OUTPATIENT
Start: 2025-07-30

## 2025-07-30 RX ORDER — GLUCOSAM/CHON-MSM1/C/MANG/BOSW 500-416.6
TABLET ORAL
Qty: 100 EACH | Refills: 1 | Status: SHIPPED | OUTPATIENT
Start: 2025-07-30